# Patient Record
Sex: MALE | Race: OTHER | HISPANIC OR LATINO | URBAN - METROPOLITAN AREA
[De-identification: names, ages, dates, MRNs, and addresses within clinical notes are randomized per-mention and may not be internally consistent; named-entity substitution may affect disease eponyms.]

---

## 2018-02-02 ENCOUNTER — INPATIENT (INPATIENT)
Facility: HOSPITAL | Age: 48
LOS: 13 days | Discharge: OTHER INSTITUTION | End: 2018-02-16
Attending: HOSPITALIST | Admitting: HOSPITALIST
Payer: MEDICAID

## 2018-02-02 VITALS
TEMPERATURE: 99 F | HEIGHT: 69 IN | OXYGEN SATURATION: 100 % | DIASTOLIC BLOOD PRESSURE: 79 MMHG | WEIGHT: 139.99 LBS | RESPIRATION RATE: 18 BRPM | HEART RATE: 80 BPM | SYSTOLIC BLOOD PRESSURE: 120 MMHG

## 2018-02-02 DIAGNOSIS — C80.1 MALIGNANT (PRIMARY) NEOPLASM, UNSPECIFIED: ICD-10-CM

## 2018-02-03 DIAGNOSIS — Z01.818 ENCOUNTER FOR OTHER PREPROCEDURAL EXAMINATION: ICD-10-CM

## 2018-02-03 DIAGNOSIS — F19.10 OTHER PSYCHOACTIVE SUBSTANCE ABUSE, UNCOMPLICATED: ICD-10-CM

## 2018-02-03 DIAGNOSIS — C80.1 MALIGNANT (PRIMARY) NEOPLASM, UNSPECIFIED: ICD-10-CM

## 2018-02-03 DIAGNOSIS — Z29.9 ENCOUNTER FOR PROPHYLACTIC MEASURES, UNSPECIFIED: ICD-10-CM

## 2018-02-03 DIAGNOSIS — B19.20 UNSPECIFIED VIRAL HEPATITIS C WITHOUT HEPATIC COMA: ICD-10-CM

## 2018-02-03 DIAGNOSIS — M84.40XA PATHOLOGICAL FRACTURE, UNSPECIFIED SITE, INITIAL ENCOUNTER FOR FRACTURE: ICD-10-CM

## 2018-02-03 LAB
24R-OH-CALCIDIOL SERPL-MCNC: 36 NG/ML — SIGNIFICANT CHANGE UP (ref 30–80)
AFP-TM SERPL-MCNC: 2.9 NG/ML — SIGNIFICANT CHANGE UP
ALBUMIN SERPL ELPH-MCNC: 2.8 G/DL — LOW (ref 3.3–5)
ALP SERPL-CCNC: 130 U/L — HIGH (ref 40–120)
ALT FLD-CCNC: 91 U/L — HIGH (ref 4–41)
APTT BLD: 33.8 SEC — SIGNIFICANT CHANGE UP (ref 27.5–37.4)
AST SERPL-CCNC: 140 U/L — HIGH (ref 4–40)
BASOPHILS # BLD AUTO: 0.02 K/UL — SIGNIFICANT CHANGE UP (ref 0–0.2)
BASOPHILS NFR BLD AUTO: 0.3 % — SIGNIFICANT CHANGE UP (ref 0–2)
BILIRUB SERPL-MCNC: 0.6 MG/DL — SIGNIFICANT CHANGE UP (ref 0.2–1.2)
BLD GP AB SCN SERPL QL: NEGATIVE — SIGNIFICANT CHANGE UP
BUN SERPL-MCNC: 10 MG/DL — SIGNIFICANT CHANGE UP (ref 7–23)
CALCIUM SERPL-MCNC: 8.1 MG/DL — LOW (ref 8.4–10.5)
CHLORIDE SERPL-SCNC: 103 MMOL/L — SIGNIFICANT CHANGE UP (ref 98–107)
CO2 SERPL-SCNC: 23 MMOL/L — SIGNIFICANT CHANGE UP (ref 22–31)
CREAT SERPL-MCNC: 0.6 MG/DL — SIGNIFICANT CHANGE UP (ref 0.5–1.3)
EOSINOPHIL # BLD AUTO: 0.14 K/UL — SIGNIFICANT CHANGE UP (ref 0–0.5)
EOSINOPHIL NFR BLD AUTO: 2.3 % — SIGNIFICANT CHANGE UP (ref 0–6)
GLUCOSE SERPL-MCNC: 95 MG/DL — SIGNIFICANT CHANGE UP (ref 70–99)
HCT VFR BLD CALC: 31.8 % — LOW (ref 39–50)
HCV AB S/CO SERPL IA: 6.43 S/CO — SIGNIFICANT CHANGE UP
HCV AB SERPL-IMP: REACTIVE — SIGNIFICANT CHANGE UP
HGB BLD-MCNC: 10.2 G/DL — LOW (ref 13–17)
IMM GRANULOCYTES # BLD AUTO: 0.03 # — SIGNIFICANT CHANGE UP
IMM GRANULOCYTES NFR BLD AUTO: 0.5 % — SIGNIFICANT CHANGE UP (ref 0–1.5)
INR BLD: 1.14 — SIGNIFICANT CHANGE UP (ref 0.88–1.17)
LYMPHOCYTES # BLD AUTO: 1.1 K/UL — SIGNIFICANT CHANGE UP (ref 1–3.3)
LYMPHOCYTES # BLD AUTO: 18 % — SIGNIFICANT CHANGE UP (ref 13–44)
MAGNESIUM SERPL-MCNC: 1.9 MG/DL — SIGNIFICANT CHANGE UP (ref 1.6–2.6)
MCHC RBC-ENTMCNC: 27.8 PG — SIGNIFICANT CHANGE UP (ref 27–34)
MCHC RBC-ENTMCNC: 32.1 % — SIGNIFICANT CHANGE UP (ref 32–36)
MCV RBC AUTO: 86.6 FL — SIGNIFICANT CHANGE UP (ref 80–100)
MONOCYTES # BLD AUTO: 0.61 K/UL — SIGNIFICANT CHANGE UP (ref 0–0.9)
MONOCYTES NFR BLD AUTO: 10 % — SIGNIFICANT CHANGE UP (ref 2–14)
NEUTROPHILS # BLD AUTO: 4.22 K/UL — SIGNIFICANT CHANGE UP (ref 1.8–7.4)
NEUTROPHILS NFR BLD AUTO: 68.9 % — SIGNIFICANT CHANGE UP (ref 43–77)
NRBC # FLD: 0 — SIGNIFICANT CHANGE UP
PHOSPHATE SERPL-MCNC: 2.4 MG/DL — LOW (ref 2.5–4.5)
PLATELET # BLD AUTO: 115 K/UL — LOW (ref 150–400)
PMV BLD: 11.6 FL — SIGNIFICANT CHANGE UP (ref 7–13)
POTASSIUM SERPL-MCNC: 4.3 MMOL/L — SIGNIFICANT CHANGE UP (ref 3.5–5.3)
POTASSIUM SERPL-SCNC: 4.3 MMOL/L — SIGNIFICANT CHANGE UP (ref 3.5–5.3)
PROT SERPL-MCNC: 8.7 G/DL — HIGH (ref 6–8.3)
PROTHROM AB SERPL-ACNC: 12.7 SEC — SIGNIFICANT CHANGE UP (ref 9.8–13.1)
RBC # BLD: 3.67 M/UL — LOW (ref 4.2–5.8)
RBC # FLD: 14.8 % — HIGH (ref 10.3–14.5)
RH IG SCN BLD-IMP: POSITIVE — SIGNIFICANT CHANGE UP
SODIUM SERPL-SCNC: 138 MMOL/L — SIGNIFICANT CHANGE UP (ref 135–145)
WBC # BLD: 6.12 K/UL — SIGNIFICANT CHANGE UP (ref 3.8–10.5)
WBC # FLD AUTO: 6.12 K/UL — SIGNIFICANT CHANGE UP (ref 3.8–10.5)

## 2018-02-03 PROCEDURE — 99223 1ST HOSP IP/OBS HIGH 75: CPT | Mod: GC

## 2018-02-03 PROCEDURE — 12345: CPT | Mod: GC,NC

## 2018-02-03 PROCEDURE — 93010 ELECTROCARDIOGRAM REPORT: CPT

## 2018-02-03 RX ORDER — HYDROMORPHONE HYDROCHLORIDE 2 MG/ML
3 INJECTION INTRAMUSCULAR; INTRAVENOUS; SUBCUTANEOUS EVERY 4 HOURS
Qty: 0 | Refills: 0 | Status: DISCONTINUED | OUTPATIENT
Start: 2018-02-03 | End: 2018-02-08

## 2018-02-03 RX ORDER — DOCUSATE SODIUM 100 MG
100 CAPSULE ORAL
Qty: 0 | Refills: 0 | Status: DISCONTINUED | OUTPATIENT
Start: 2018-02-03 | End: 2018-02-03

## 2018-02-03 RX ORDER — POLYETHYLENE GLYCOL 3350 17 G/17G
17 POWDER, FOR SOLUTION ORAL DAILY
Qty: 0 | Refills: 0 | Status: DISCONTINUED | OUTPATIENT
Start: 2018-02-03 | End: 2018-02-16

## 2018-02-03 RX ORDER — OXYCODONE HYDROCHLORIDE 5 MG/1
10 TABLET ORAL EVERY 8 HOURS
Qty: 0 | Refills: 0 | Status: DISCONTINUED | OUTPATIENT
Start: 2018-02-03 | End: 2018-02-03

## 2018-02-03 RX ORDER — MORPHINE SULFATE 50 MG/1
2 CAPSULE, EXTENDED RELEASE ORAL ONCE
Qty: 0 | Refills: 0 | Status: DISCONTINUED | OUTPATIENT
Start: 2018-02-03 | End: 2018-02-03

## 2018-02-03 RX ORDER — OXYCODONE HYDROCHLORIDE 5 MG/1
10 TABLET ORAL EVERY 8 HOURS
Qty: 0 | Refills: 0 | Status: DISCONTINUED | OUTPATIENT
Start: 2018-02-03 | End: 2018-02-05

## 2018-02-03 RX ORDER — OXYCODONE AND ACETAMINOPHEN 5; 325 MG/1; MG/1
2 TABLET ORAL ONCE
Qty: 0 | Refills: 0 | Status: DISCONTINUED | OUTPATIENT
Start: 2018-02-03 | End: 2018-02-03

## 2018-02-03 RX ORDER — GABAPENTIN 400 MG/1
300 CAPSULE ORAL EVERY 8 HOURS
Qty: 0 | Refills: 0 | Status: DISCONTINUED | OUTPATIENT
Start: 2018-02-03 | End: 2018-02-05

## 2018-02-03 RX ORDER — SODIUM,POTASSIUM PHOSPHATES 278-250MG
1 POWDER IN PACKET (EA) ORAL
Qty: 0 | Refills: 0 | Status: COMPLETED | OUTPATIENT
Start: 2018-02-03 | End: 2018-02-04

## 2018-02-03 RX ORDER — PROPRANOLOL HCL 160 MG
10 CAPSULE, EXTENDED RELEASE 24HR ORAL EVERY 12 HOURS
Qty: 0 | Refills: 0 | Status: DISCONTINUED | OUTPATIENT
Start: 2018-02-03 | End: 2018-02-16

## 2018-02-03 RX ORDER — HYDROMORPHONE HYDROCHLORIDE 2 MG/ML
3 INJECTION INTRAMUSCULAR; INTRAVENOUS; SUBCUTANEOUS EVERY 4 HOURS
Qty: 0 | Refills: 0 | Status: DISCONTINUED | OUTPATIENT
Start: 2018-02-03 | End: 2018-02-03

## 2018-02-03 RX ADMIN — MORPHINE SULFATE 2 MILLIGRAM(S): 50 CAPSULE, EXTENDED RELEASE ORAL at 09:26

## 2018-02-03 RX ADMIN — OXYCODONE HYDROCHLORIDE 10 MILLIGRAM(S): 5 TABLET ORAL at 06:46

## 2018-02-03 RX ADMIN — GABAPENTIN 300 MILLIGRAM(S): 400 CAPSULE ORAL at 22:17

## 2018-02-03 RX ADMIN — OXYCODONE AND ACETAMINOPHEN 2 TABLET(S): 5; 325 TABLET ORAL at 00:56

## 2018-02-03 RX ADMIN — OXYCODONE HYDROCHLORIDE 10 MILLIGRAM(S): 5 TABLET ORAL at 15:31

## 2018-02-03 RX ADMIN — HYDROMORPHONE HYDROCHLORIDE 3 MILLIGRAM(S): 2 INJECTION INTRAMUSCULAR; INTRAVENOUS; SUBCUTANEOUS at 11:49

## 2018-02-03 RX ADMIN — OXYCODONE HYDROCHLORIDE 10 MILLIGRAM(S): 5 TABLET ORAL at 07:46

## 2018-02-03 RX ADMIN — HYDROMORPHONE HYDROCHLORIDE 3 MILLIGRAM(S): 2 INJECTION INTRAMUSCULAR; INTRAVENOUS; SUBCUTANEOUS at 11:34

## 2018-02-03 RX ADMIN — Medication 1 TABLET(S): at 13:03

## 2018-02-03 RX ADMIN — GABAPENTIN 300 MILLIGRAM(S): 400 CAPSULE ORAL at 13:02

## 2018-02-03 RX ADMIN — HYDROMORPHONE HYDROCHLORIDE 3 MILLIGRAM(S): 2 INJECTION INTRAMUSCULAR; INTRAVENOUS; SUBCUTANEOUS at 16:05

## 2018-02-03 RX ADMIN — OXYCODONE HYDROCHLORIDE 10 MILLIGRAM(S): 5 TABLET ORAL at 23:08

## 2018-02-03 RX ADMIN — HYDROMORPHONE HYDROCHLORIDE 3 MILLIGRAM(S): 2 INJECTION INTRAMUSCULAR; INTRAVENOUS; SUBCUTANEOUS at 15:50

## 2018-02-03 RX ADMIN — HYDROMORPHONE HYDROCHLORIDE 3 MILLIGRAM(S): 2 INJECTION INTRAMUSCULAR; INTRAVENOUS; SUBCUTANEOUS at 20:42

## 2018-02-03 RX ADMIN — OXYCODONE HYDROCHLORIDE 10 MILLIGRAM(S): 5 TABLET ORAL at 22:17

## 2018-02-03 RX ADMIN — MORPHINE SULFATE 2 MILLIGRAM(S): 50 CAPSULE, EXTENDED RELEASE ORAL at 09:41

## 2018-02-03 RX ADMIN — Medication 1 TABLET(S): at 22:17

## 2018-02-03 RX ADMIN — Medication 10 MILLIGRAM(S): at 18:02

## 2018-02-03 RX ADMIN — HYDROMORPHONE HYDROCHLORIDE 3 MILLIGRAM(S): 2 INJECTION INTRAMUSCULAR; INTRAVENOUS; SUBCUTANEOUS at 20:57

## 2018-02-03 RX ADMIN — Medication 1 TABLET(S): at 18:02

## 2018-02-03 RX ADMIN — Medication 1 TABLET(S): at 14:31

## 2018-02-03 RX ADMIN — OXYCODONE HYDROCHLORIDE 10 MILLIGRAM(S): 5 TABLET ORAL at 14:31

## 2018-02-03 RX ADMIN — OXYCODONE AND ACETAMINOPHEN 2 TABLET(S): 5; 325 TABLET ORAL at 01:56

## 2018-02-03 NOTE — PROGRESS NOTE ADULT - PROBLEM SELECTOR PLAN 2
As per outside records, positive IgG. Complicated by liver cirrhosis, esophageal varices, portal HTN. Outside labs show thrombocytopenia,, transaminitis,, and elevated alk phos, synthetic function preserved (INR wnl)  - f/u PCR and genotype  - Consider starting nadolol for primary ppx if BP tolerates (will hold for now in case Ortho would like to take patient for surgery)   - will consult hepatology

## 2018-02-03 NOTE — DISCHARGE NOTE ADULT - HOSPITAL COURSE
48 y/o male with hx of drug abuse, hepatitis C, chronic back pain, possible new diagnosis of hepatocellular carcinoma transferred from outside hospital for pathologic fracture. Patient initially presented to the hospital two weeks ago for worsening lower back and left hip pain with limited ambulation. Imaging revealed pathologic fractures of the left sacrum, left ilium, and right acetabulum. Orthopaedic surgery was consulted who recommended _______. Hepatitis labs showed ____________. Hepatology was consulted who recommended __________. Pain was controlled with a regimen of _________.     Pt. is stable and safe for d/c to _________. 46 y/o male with hx of drug abuse, hepatitis C, chronic back pain, possible new diagnosis of hepatocellular carcinoma transferred from outside hospital for pathologic fracture. Patient initially presented to the hospital two weeks ago for worsening lower back and left hip pain with limited ambulation. Imaging revealed pathologic fractures of the left sacrum, left ilium, and right acetabulum. Biopsy of left pelvic mass at OSH was suggestive of a pancreaticobiliary or lung primary but no primary indeterminate. IR was consulted for repeat biopsy in order to diagnose primary. Orthopedic surgery was consulted who recommended further imaging which showed ________. Hepatitis labs showed hep C RNA, qualitative +, hep C virus RNA detection by PCR at 2,336,276, RNA log value of 6.37, and genotype of 1a. Hepatology was consulted who determined that this was likely chronic HCV and deferred treatment until after malignancy w/u was completed.     Pt was experiencing severe pain requiring palliative team for pain management.      Pt's pain was well-controlled and he was hemodynamically stable on day of discharge. 48 y/o male with hx of drug abuse, hepatitis C, chronic back pain, possible new diagnosis of metastatic disease, transferred from outside hospital for pathologic fracture. Patient initially presented to the hospital two weeks ago for worsening lower back and left hip pain with limited ambulation. Imaging revealed pathologic fractures of the left sacrum, left ilium, and right acetabulum. Biopsy of left pelvic mass at OSH was suggestive of a pancreaticobiliary or lung primary but other sites not able to be ruled out. Pt was transferred to this hospital for possible orthopedic intervention. IR was consulted for repeat biopsy in order to diagnose primary. Oncology recommended labs and expedition of biopsy in order to construct a plan for treatment. Orthopedic surgery was consulted who recommended further imaging of the pathologic fractures which showed ________. Ortho _____. Pt was experiencing severe pain requiring palliative team for pain management. Patient's pain was well-controlled on methadone, dilaudid IVPB and IV, and lyrica regimen w/ narcan prn if needed. Hepatitis labs showed hep C RNA, qualitative +, hep C virus RNA detection by PCR at 2,336,276, RNA log value of 6.37, and genotype of 1a. Hepatology was consulted who determined that this was likely chronic HCV and deferred treatment until after malignancy w/u was completed. Biopsy showed____. Oncology ____.     Pt's pain was well-controlled and he was hemodynamically stable on day of discharge. 46 y/o male with hx of drug abuse, hepatitis C, chronic back pain who was transferred from [facility] for pathologic fracture. Patient initially presented to [facility] two weeks ago for worsening lower back and left hip pain with limited ambulation. Imaging revealed pathologic fractures of the left sacrum, left ilium, and right acetabulum. Biopsy of left pelvic mass at OSH was suggestive of a pancreaticobiliary or lung primary but other sites not able to be ruled out. Pt was transferred to this hospital for possible orthopedic intervention. IR was consulted for repeat biopsy in order to diagnose primary. Oncology recommended labs and expedition of biopsy in order to construct a plan for treatment. Orthopedic surgery was consulted, however patient was unable to tolerate the recommended MRI imaging of the hip.Pt was experiencing severe pain requiring palliative team for pain management. Patient's pain was well-controlled on methadone, dilaudid IVPB and IV, and lyrica regimen w/ narcan prn if needed. Hepatitis labs showed hep C RNA, qualitative +, hep C virus RNA detection by PCR at 2,336,276, RNA log value of 6.37, and genotype of 1a. Hepatology was consulted who determined that this was likely chronic HCV and deferred treatment until after malignancy w/u was completed. Biopsy showed metastatic dz - likely adenocarcinoma of unclear primary. 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 Per onc not a candidate for systemic chemotherapy due to lack of social support. Extensive discussions with the patient came to the conclusion that the patient wanted to focus on comfort care and would be willing to transfer to [facility] for inpatient hospice for pain management. A MOLST form was filled out w/ DNR/DNI.     Pt's pain was well-controlled and he was hemodynamically stable on day of discharge. 46 y/o male with hx of drug abuse, hepatitis C, chronic back pain who was transferred from [facility] for pathologic fracture. Patient initially presented to [facility] two weeks ago for worsening lower back and left hip pain with limited ambulation. Imaging revealed pathologic fractures of the left sacrum, left ilium, and right acetabulum. Biopsy of left pelvic mass at OSH was suggestive of a pancreaticobiliary or lung primary but other sites not able to be ruled out. Pt was transferred to this hospital for possible orthopedic intervention. IR was consulted for repeat biopsy in order to diagnose primary. Oncology recommended labs and expedition of biopsy in order to construct a plan for treatment. Orthopedic surgery was consulted, however patient was unable to tolerate the recommended MRI imaging of the hip.Pt was experiencing severe pain requiring palliative assistance with pain management. Patient's pain was well-controlled on methadone, dilaudid IVPB and IV, and lyrica regimen. Hepatitis labs showed hep C RNA, qualitative +, hep C virus RNA detection by PCR at 2,336,276, RNA log value of 6.37, and genotype of 1a. Hepatology was consulted who determined that this was likely chronic HCV and deferred treatment given malignancy. Biopsy showed metastatic dz - likely adenocarcinoma of unclear primary. Per ortho no surgical intervention as fixation would unlikely benefit patient w/o radiation as patient is currently refusing radiation.The primary was not determined but was shown as a poorly differentiated adenocarcinoma, ?pancreaticobiliary vs lung with elevated ca 19-9 and CEA. Per onc not a candidate for systemic chemotherapy due to lack of social support. Extensive discussions with the patient came to the conclusion that the patient wanted to focus on comfort care and would be willing to transfer to [facility] for inpatient hospice for pain management. A MOLST form was filled out w/ DNR/DNI.     Pt's pain was well-controlled and he was hemodynamically stable on day of discharge.

## 2018-02-03 NOTE — H&P ADULT - HISTORY OF PRESENT ILLNESS
46 yo Male with hx of drug abuse, hepatitis C, chronic back pain 46 yo Male with hx of drug abuse, hepatitis C, chronic back pain, possible new diagnosis of hepatocellular carcinoma transferred from outside hospital for pathologic fracture. Patient initially presented to the hospital two weeks ago for worsening lower back and left hip pain. Pain limited ambulation. Imaging revealed pathologic fractures of the left sacrum, left ilium, and right acetabulum. Patient was accepted by Dr. Back (orthopedic surgery). Patient reports he has been using IV heroin for about twenty years. He was diagnosed with hepatitic C, but never received treatment. He completed a detox program prior to arriving to the hospital two weeks ago. Does not have family; lives in a shelter.     Denies fevers, chills, nausea, vomiting, hemoptysis, chest pain, SOB, abdominal pain, abdominal swelling, lower extremity swelling, rashes. Does have intermittent nose bleeds. 46 yo Male with hx of drug abuse, hepatitis C, chronic back pain, possible new diagnosis of hepatocellular carcinoma transferred from outside hospital for pathologic fracture. Patient initially presented to the hospital two weeks ago for worsening lower back and left hip pain. Pain limited ambulation. Imaging revealed pathologic fractures of the left sacrum, left ilium, and right acetabulum. Patient was accepted by Dr. Back (orthopedic surgery). Patient reports he has been using IV heroin for about twenty years. Recently went through detox, s/p methadone (no longer taking). He was diagnosed with hepatitic C, but never received treatment. He completed a detox program prior to arriving to the hospital two weeks ago. Does not have family; lives in a shelter.     Denies fevers, chills, nausea, vomiting, hemoptysis, chest pain, SOB, abdominal pain, abdominal swelling, lower extremity swelling, rashes. Does have intermittent nose bleeds.

## 2018-02-03 NOTE — H&P ADULT - NSHPSOCIALHISTORY_GEN_ALL_CORE
Social alcohol use   Former tobacco use  IV heroin use daily  No family  lives in shelter  Unemployed

## 2018-02-03 NOTE — H&P ADULT - PROBLEM SELECTOR PLAN 1
left ilium, right acetabulum, and left sacrum in setting of newly diagnosed malignancy- primary liver most likely   - Will contact Orthopedic Surgery team and Dr. Back to confirm they are aware the patient has arrived   - Pain control with oxycodone 10 q8hrs (as per patient, that is what he was taking in the prior hospital)   - Bowel regimen   - Neuro checks q8hrs  - Vit D level pending

## 2018-02-03 NOTE — PROGRESS NOTE ADULT - PROBLEM SELECTOR PLAN 1
left ilium, right acetabulum, and left sacrum in setting of newly diagnosed malignancy- primary most likely HCC  - contact orthopaedic surgery (Dr. Back accepted pt)  - Pain control with oxycodone 10 q8hrs (per pt, need med rec from OSH)  - Bowel regimen   - Neuro checks q8hrs left ilium, right acetabulum, and left sacrum in setting of newly diagnosed malignancy- primary most likely HCC  - contact orthopaedic surgery (Dr. Back)  - Pain control with oxycodone 10 q8hrs (per pt, need med rec from OSH)  - Bowel regimen   - Neuro checks q8hrs

## 2018-02-03 NOTE — H&P ADULT - PROBLEM SELECTOR PLAN 2
As per outside records, positive IgG. Complicated by liver cirrhosis, esophageal varices, portal HTN. Outside labs show thrombocytopenia,, transaminitis,, and elevated alk phos   - Will check PCR and genotype  - Consider starting nadolol for primary ppx if BP tolerates (will hold for now in case Ortho would like to take patient for surgery)   - Will check LFTs, INR

## 2018-02-03 NOTE — H&P ADULT - PROBLEM SELECTOR PLAN 3
stage IV. Liver is likely the primary source as per imaging   - Will check alpha feta protein level   - Call Dr. Donovan in AM, he is the oncologist that has been following the patient

## 2018-02-03 NOTE — DISCHARGE NOTE ADULT - MEDICATION SUMMARY - MEDICATIONS TO TAKE
I will START or STAY ON the medications listed below when I get home from the hospital:    methadone  -- 5 milligram(s) by mouth once a day at 6AM  -- Indication: For Pain due to neoplasm    methadone  -- 7.5 milligram(s) by mouth once a day at 6PM  -- Indication: For Pain due to neoplasm    HYDROmorphone  -- 4 milligram(s) intravenous every 3 hours, As Needed for severe pain  -- Indication: For Pain due to neoplasm    HYDROmorphone  -- 8 milligram(s) in sodium chloride 0.9% 50 miliLiters, IV intermittent, every day at 04:00, 10:00, 16:00 and 22:00, infused over 15 minutes (IV piggy back)  -- Indication: For Pain due to neoplasm    propranolol 10 mg oral tablet  -- 1 tab(s) by mouth every 12 hours. HOLD for SBP < 110, HR< 65  -- Indication: For Esophageal varices Prophylaxis, history of hepatitis C    enoxaparin  -- 40 milligram(s) subcutaneous once a day  -- Indication: For VTE prophylaxis    pregabalin 75 mg oral capsule  -- 1 cap(s) by mouth 2 times a day  -- Indication: For Pain due to neoplasm    naloxone  -- 0.4 milligram(s) intravenous every 2 minutes, As Needed for respiratory depression (respiratory rate < 10), and sedation  -- Indication: For Antidote to opioid toxicity    senna oral tablet  -- 2 tab(s) by mouth once a day (at bedtime)  -- Indication: For Constipation    polyethylene glycol 3350 oral powder for reconstitution  -- 17 gram(s) by mouth once a day, As needed, Constipation  -- Indication: For Constipation    docusate sodium 100 mg oral capsule  -- 1 cap(s) by mouth once a day  -- Indication: For Constipation    melatonin 3 mg oral tablet  -- 1 tab(s) by mouth once a day (at bedtime), As Needed for insomnia  -- Indication: For Insomnia, unspecified type    calcium-vitamin D 500 mg-200 intl units oral tablet  -- 1 tab(s) by mouth once a day  -- Indication: For Bone strength

## 2018-02-03 NOTE — DISCHARGE NOTE ADULT - OTHER SIGNIFICANT FINDINGS
Final Diagnosis  SOFT TISSUE, PELVIC AREA, LEFT, CORE BIOPSY  POSITIVE FOR MALIGNANT CELLS.  Poorly differentiated non-small cellcarcinoma, favor  adenocarcinoma    Note: Cytology imprints and core biopsy show malignant cells,  singly and in flat cohesive sheets, with enlarged irregular  nuclei, irregular chromatin pattern, prominent nucleoli, and  scant vacuolated cytoplasm in a background of              ARROYO CANDICE                     3        Cytopathology Report          marked necrotic debris, acute inflammatory cells, atypical  mitotic figures, and cellular apoptosis.    Immunostains for AE1/AE3, and CK 7 are strongly positive in tumor  cells.  Focal staining is noted with high molecular weight cytokeratin  and .  Stains for CK 20, TTF-1, MANUEL-3, WT1, CDX - 2, CA 19-9 and P16  are negative..  The immunophenotype supports the diagnosis of poorly  differentiated carcinoma but does not identify the primary site.  Additional immunostains are pending; supplemental report to  follow.    Slide(s) with built in immunohistochemical study control(s) and  negative control associated with this case has/have been verified  by the sign out pathologist.  For slide(s) without built in controls positive control slides  has/have been reviewed and approved by immunohistochemistry lab  These immunohistochemical/ in-situ hybridization tests have been  developed and their performance characteristics determined by  [facility], Department of Pathology,  Division of Immunopathology, [facility] [place] [address].  It has not been cleared or approved by the U.[place]. Food  and Drug Administration.  The FDA has determined that such  clearance or approval is not necessary.  This test is used for  clinical purposes.  The laboratory is certified under the CLIA-88  as qualified to perform high complexity clinical testing.    This case was reviewed for  with cytopathology  staff who  concur(s) with the diagnosis on 02/13/18.

## 2018-02-03 NOTE — DISCHARGE NOTE ADULT - CARE PLAN
Principal Discharge DX:	Pathologic fracture  Goal:	follow up  Assessment and plan of treatment:	You had a fracture of your hip due to metastatic disease from your cancer. Orthopaedic surgery ______  Secondary Diagnosis:	Hepatitis C  Assessment and plan of treatment:	You have an active hepatitis C infection. Hepatology was consulted who recommended _______. Please follow up with them after discharge.  Secondary Diagnosis:	Malignancy  Assessment and plan of treatment:	You have a malignancy that has metastasized to your bones. Biopsy results showed _______. Please follow up with your oncologist Dr. Donovan after discharge. Principal Discharge DX:	Pathologic fracture  Goal:	follow up  Assessment and plan of treatment:	You had a fracture of your hip due to metastatic disease from your cancer. Orthopaedic surgery ordered imaging to evaluate your fractures and determined that ____  Secondary Diagnosis:	Hepatitis C  Goal:	Follow up  Assessment and plan of treatment:	You have an active hepatitis C infection. Hepatology was consulted who recommended . Please follow up with them after discharge.  Secondary Diagnosis:	Malignancy  Assessment and plan of treatment:	You have a malignancy that has metastasized to your bones. Biopsy results showed _______. Please follow up with your oncologist Dr. Donovan after discharge. Principal Discharge DX:	Pathologic fracture  Goal:	follow up  Assessment and plan of treatment:	You had a fracture of your hip due to metastatic disease from your cancer. Orthopedic surgery ordered imaging to evaluate your fractures and determined that ____  Secondary Diagnosis:	Hepatitis C  Goal:	Follow up  Assessment and plan of treatment:	You have an active hepatitis C infection. Hepatology was consulted who recommended . Please follow up with them after discharge.  Secondary Diagnosis:	Malignancy  Assessment and plan of treatment:	You have a malignancy that has metastasized to your bones. Biopsy results showed _______. Please follow up with your oncologist Dr. Donovan after discharge. Principal Discharge DX:	Pathologic fracture  Goal:	follow up  Assessment and plan of treatment:	You had a fracture of your hip due to metastatic disease from your cancer.  Secondary Diagnosis:	Hepatitis C  Goal:	Follow up  Assessment and plan of treatment:	You have an active hepatitis C infection. Hepatology was consulted who recommended . Please follow up with them after discharge.  Secondary Diagnosis:	Malignancy  Assessment and plan of treatment:	You have a malignancy that has metastasized to your bones. Biopsy results showed _______. Please follow up with your oncologist Dr. Donovan after discharge. Principal Discharge DX:	Pathologic fracture  Goal:	follow up  Assessment and plan of treatment:	You were admitted for a fracture that was caused by metastatic disease from an unknown primary cancer. You were evaluated by orthopedics who determined that surgery was more risk than would be benefit due to the nature of the metastatic disease and being that you did not want radiation therapy. It was determined that you would not benefit from chemotherapy. You decided with Dr. Melgar that you would like to continue with inpatient hospice at [facility] for pain management and you signed a MOLST form stating that you would not want heroic measures.  Secondary Diagnosis:	Hepatitis C  Goal:	Follow up  Assessment and plan of treatment:	You have an active hepatitis C infection. Hepatology was consulted who recommended . No further treatment was offer as it would cause more harm than benefit due to the overall medical condition.  Secondary Diagnosis:	Adenocarcinoma  Assessment and plan of treatment:	You were admitted for a fracture that was caused by metastatic disease from an unknown primary cancer.  The biopsy results came back as poorly differentiated adenocarcinoma of unknown primary. It was determined that you would not benefit from chemotherapy. You decided with Dr. Melgar that you would like to continue with inpatient hospice at [facility] for pain management and you signed a MOLST form stating that you would not want heroic measures.  Secondary Diagnosis:	Palliative care encounter  Assessment and plan of treatment:	You decided with Dr. Melgar that you would like to continue with inpatient hospice at [facility] for pain management and you signed a MOLST form stating that you would not want heroic measures.  Secondary Diagnosis:	Pain due to neoplasm  Assessment and plan of treatment:	Due to the extensive metastatic disease you have significant pain requiring constant medication for pain control. You will continue your management at [facility].  Secondary Diagnosis:	Cirrhosis of liver without ascites, unspecified hepatic cirrhosis type  Assessment and plan of treatment:	You did not have any complications from cirrhosis likely secondary to hepatitis C. No further treatment was offer as it would cause more harm than benefit due to the overall medical condition. You will be monitored for worsening symptoms at [facility], but are not on any current medications.  Secondary Diagnosis:	Severe protein-calorie malnutrition  Assessment and plan of treatment:	You have low protein likely due to the extensive metastatic cancer which requires dietary supplementation. Please continue your Enliven supplementation as directed.

## 2018-02-03 NOTE — CONSULT NOTE ADULT - SUBJECTIVE AND OBJECTIVE BOX
Chief Complaint:  Patient is a 47y old  Male who presents with a chief complaint of hip pain (03 Feb 2018 12:32)      HPI:  47M with drug abuse, HCV infection, chronic back pain, presented to outside hospital with acute on chronic back pain at which time he was found to have pathologic fractures and transferred to [facility] for orthopedic evaluation. The patient states he was in the hospital for 2 weeks and had recently completed a drug rehab program- he had previously been using IV heroin over the past 20 years. He is aware that he is HCV positive and has never received treatment. He denies any abdominal pain, nausea or vomiting. His pain is localized to his bilateral hips. He notes 40-50lb unintentional weight loss over the past 6 months. He denies any jaundice or pruritis. He notes regular non-bloody bowel movements. He has an appetite.   The patient states that while he was had OSH, he underwent MRI and biopsy of liver lesion- he is unsure of the result.     Allergies:  No Known Allergies      Home Medications: reviewed     Hospital Medications:  calcium carbonate 1250 mG + Vitamin D (OsCal 500 + D) 1 Tablet(s) Oral daily  gabapentin 300 milliGRAM(s) Oral every 8 hours  HYDROmorphone  Injectable 3 milliGRAM(s) IV Push every 4 hours PRN  oxyCODONE    IR 10 milliGRAM(s) Oral every 8 hours  polyethylene glycol 3350 17 Gram(s) Oral daily PRN  potassium acid phosphate/sodium acid phosphate tablet (K-PHOS No. 2) 1 Tablet(s) Oral four times a day with meals  propranolol 10 milliGRAM(s) Oral every 12 hours      PMHX/PSHX:  IV drug abuse  Hepatitis C  No significant past surgical history      Family history:  No pertinent family history in first degree relatives      Social History: +IV heroin use, no tobacco, unclear ETOH     ROS:     General:  No wt loss, fevers, chills, night sweats, fatigue,   Eyes:  Good vision, no reported pain  ENT:  No sore throat, pain, runny nose, dysphagia  CV:  No pain, palpitations, hypo/hypertension  Resp:  No dyspnea, cough, tachypnea, wheezing  GI:  No pain, No nausea, No vomiting, No diarrhea, No constipation, No weight loss, No fever, No pruritis, No rectal bleeding, No tarry stools, No dysphagia,  :  No pain, bleeding, incontinence, nocturia  Muscle:  + hip pain,  no weakness  Neuro:  No weakness, tingling, memory problems  Psych:  No fatigue, insomnia, mood problems, depression  Endocrine:  No polyuria, polydipsia, cold/heat intolerance  Heme:  No petechiae, ecchymosis, easy bruisability  Skin:  No rash, tattoos, scars, edema      PHYSICAL EXAM:     GENERAL:  Appears stated age, cachectic,  no distress  HEENT:  NC/AT,  conjunctivae clear and pink, no thyromegaly, nodules, adenopathy, no JVD, sclera -anicteric  CHEST:  Full & symmetric excursion, no increased effort, breath sounds clear  HEART:  Regular rhythm, S1, S2, no murmur/rub/S3/S4, no abdominal bruit, no edema  ABDOMEN:  Soft, non-tender, non-distended, normoactive bowel sounds,  no masses ,+ hepato-splenomegaly, no signs of chronic liver disease  EXTEREMITIES:  no cyanosis,clubbing or edema  SKIN:  No rash/erythema/ecchymoses/petechiae/wounds/abscess/warm/dry  NEURO:  Alert, oriented, no asterixis, no tremor, no encephalopathy    Vital Signs:  Vital Signs Last 24 Hrs  T(C): 36.7 (03 Feb 2018 06:58), Max: 37.2 (02 Feb 2018 23:17)  T(F): 98 (03 Feb 2018 06:58), Max: 98.9 (02 Feb 2018 23:17)  HR: 75 (03 Feb 2018 06:58) (75 - 80)  BP: 118/64 (03 Feb 2018 06:58) (118/64 - 120/79)  BP(mean): --  RR: 16 (03 Feb 2018 06:58) (16 - 18)  SpO2: 100% (03 Feb 2018 06:58) (100% - 100%)  Daily Height in cm: 175.26 (02 Feb 2018 23:17)    Daily     LABS:                        10.2   6.12  )-----------( 115      ( 03 Feb 2018 06:35 )             31.8     Hemoglobin: 10.2 g/dL (02-03-18 @ 06:35)    Mean Cell Volume: 86.6 fL (02-03-18 @ 06:35)    02-03    138  |  103  |  10  ----------------------------<  95  4.3   |  23  |  0.60    Ca    8.1<L>      03 Feb 2018 06:35  Phos  2.4     02-03  Mg     1.9     02-03    TPro  8.7<H>  /  Alb  2.8<L>  /  TBili  0.6  /  DBili  x   /  AST  140<H>  /  ALT  91<H>  /  AlkPhos  130<H>  02-03    LIVER FUNCTIONS - ( 03 Feb 2018 06:35 )  Alb: 2.8 g/dL / Pro: 8.7 g/dL / ALK PHOS: 130 u/L / ALT: 91 u/L / AST: 140 u/L / GGT: x           PT/INR - ( 03 Feb 2018 06:35 )   PT: 12.7 SEC;   INR: 1.14          PTT - ( 03 Feb 2018 06:35 )  PTT:33.8 SEC    Hepatitis C Antibody Test (02.03.18 @ 06:35)    Hepatitis C Virus S/CO Ratio: 6.43 S/CO    Hepatitis C Virus Interpretation: Reactive        Imaging:    no new imaging

## 2018-02-03 NOTE — PROGRESS NOTE ADULT - ATTENDING COMMENTS
Patient seen and examined.  Agree with resident note. 47M with heroin abuse, Hepatitis C untreated complicated by cirrhosis and ascites, suspected HCC with pathological fractures.  Plan for orthopedic intervention.  Awaiting EKG.  If EKG is normal, patient is low cardiac risk for moderate risk procedure.  Will continue to follow up. Hepatology consult for HCV management.

## 2018-02-03 NOTE — PROGRESS NOTE ADULT - PROBLEM SELECTOR PLAN 5
DVT ppx: SCIDs in setting of thrombocytopenia   Diet: Regular -RCRI score of 0 with no cardiac risk factors, EKG _______, unable to evaluate METS functional capacity as pts. ambulation is greatly limited by pain, no cardiac limitations to exercise or functional capacity   -Pt. is medically optimized for OR     Medically cleared. No contraindication. -RCRI score of 0 with no cardiac risk factors, EKG pending. Patient prior to fractures, had no SOB/BREEN, CP/angina, LOC when climbing a flight of stairs.   - no cardiac limitations to exercise or functional capacity   -Pt. is medically optimized for OR pending EKG review.

## 2018-02-03 NOTE — H&P ADULT - NSHPREVIEWOFSYSTEMS_GEN_ALL_CORE
REVIEW OF SYSTEMS:  CONSTITUTIONAL: No fever. weight loss, fatigue  EYES: No eye pain, visual disturbances, or discharge  ENMT:  No difficulty hearing; No sinus or throat pain  RESPIRATORY: No cough, wheezing, chills or hemoptysis; No shortness of breath  CARDIOVASCULAR: No chest pain, palpitations, or leg swelling  GASTROINTESTINAL: No abdominal or epigastric pain. No nausea, vomiting, or hematemesis; No diarrhea or constipation. No melena or hematochezia.  GENITOURINARY: No dysuria, frequency, hematuria, or incontinence  NEUROLOGICAL: No headaches, memory loss, loss of strength, numbness  SKIN: No itching, burning, rashes, or lesions   MUSCULOSKELETAL: bilateral hip pain and lower back pain   HEME/LYMPH: nosebleeds

## 2018-02-03 NOTE — DISCHARGE NOTE ADULT - SECONDARY DIAGNOSIS.
Hepatitis C Malignancy Adenocarcinoma Palliative care encounter Pain due to neoplasm Cirrhosis of liver without ascites, unspecified hepatic cirrhosis type Severe protein-calorie malnutrition

## 2018-02-03 NOTE — H&P ADULT - NSHPLABSRESULTS_GEN_ALL_CORE
On outside record labs:   Normocytic anemia- Hgb ~9  Thrombocytopenia ~  Elevated Alk Phos 134  Elevated transaminitis    Lipid panel wnl     Prealbumin 12.14 (low)    (elevated)     Hep B surface antibody positive   Hep B core IgG negative   Hep C IgG positive   Hep A IgG positive     Free kappa/ lambda ration 92 (high)  Free lamda serum 97.3 (high)    Protein Electrophoresis showed elevated alpha 1 globulin, alpha 2 globulin, and gamma globulin  and beta 2 microglobulin   Immunofixation showed elevated IgG- a faint lambda band is present against a dense polyclonal background. While this process may represent a reactive/ inflammatory process, a developing plasma cell disorder cannot be excluded.     Pelvic mass: findings suggestive of pancreatobiliary or lung primary     CT Lumbar Spine w/o contrast: large soft tissue mass with bony destruction of the left sacrum and left ilium with pathologic fracture . Soft tissue mass does compress the left exiting nerve roots of the sacrum.     CT A/P w/contrast: evident of liver cirrhosis. splenomegaly, portal hypertension, esophageal varices, 3 cm greatest diameter mass lesion the right lobe of the liver inferiorly suggesting primary liver neoplasm. RP lymphadenopathy. Large left iliacus muscle mass with adjacent lytic lesion of the left side of the sacrum. left iliac  bone pathologic fracture. The right acetabulum pathologic fracture.     CT Chest: no evidence of mets     Bone Scan: small nonspecific focus of radiotracer uptake in right anterior 3rd rib. osteopenic defects in left iliac bine and sacrum and right acetabulum corresponding to lytic lesions Reviewed outside hospital paperwork.    On outside record labs:   Normocytic anemia- Hgb ~9  Thrombocytopenia ~  Elevated Alk Phos 134  Elevated transaminitis    Lipid panel wnl     Prealbumin 12.14 (low)    (elevated)     Hep B surface antibody positive   Hep B core IgG negative   Hep C IgG positive   Hep A IgG positive     Free kappa/ lambda ration 92 (high)  Free lamda serum 97.3 (high)    Protein Electrophoresis showed elevated alpha 1 globulin, alpha 2 globulin, and gamma globulin  and beta 2 microglobulin   Immunofixation showed elevated IgG- a faint lambda band is present against a dense polyclonal background. While this process may represent a reactive/ inflammatory process, a developing plasma cell disorder cannot be excluded.     Pelvic mass: findings suggestive of pancreatobiliary or lung primary     CT Lumbar Spine w/o contrast: large soft tissue mass with bony destruction of the left sacrum and left ilium with pathologic fracture . Soft tissue mass does compress the left exiting nerve roots of the sacrum.     CT A/P w/contrast: evident of liver cirrhosis. splenomegaly, portal hypertension, esophageal varices, 3 cm greatest diameter mass lesion the right lobe of the liver inferiorly suggesting primary liver neoplasm. RP lymphadenopathy. Large left iliacus muscle mass with adjacent lytic lesion of the left side of the sacrum. left iliac  bone pathologic fracture. The right acetabulum pathologic fracture.     CT Chest: no evidence of mets     Bone Scan: small nonspecific focus of radiotracer uptake in right anterior 3rd rib. osteopenic defects in left iliac bine and sacrum and right acetabulum corresponding to lytic lesions

## 2018-02-03 NOTE — PROGRESS NOTE ADULT - SUBJECTIVE AND OBJECTIVE BOX
Patient is a 47y old  Male who presents with a chief complaint of hip pain (03 Feb 2018 04:49)    SUBJECTIVE / OVERNIGHT EVENTS: Pt. endorsing lower back and hip pain, unchanged since admission. Denies fevers, chills, nausea.     MEDICATIONS  (STANDING):    MEDICATIONS  (PRN):  docusate sodium 100 milliGRAM(s) Oral two times a day PRN Constipation  oxyCODONE    IR 10 milliGRAM(s) Oral every 8 hours PRN Severe Pain (7 - 10)  polyethylene glycol 3350 17 Gram(s) Oral daily PRN Constipation      Vital Signs Last 24 Hrs  T(C): 37.2 (02 Feb 2018 23:17), Max: 37.2 (02 Feb 2018 23:17)  T(F): 98.9 (02 Feb 2018 23:17), Max: 98.9 (02 Feb 2018 23:17)  HR: 80 (02 Feb 2018 23:17) (80 - 80)  BP: 120/79 (02 Feb 2018 23:17) (120/79 - 120/79)  BP(mean): --  RR: 18 (02 Feb 2018 23:17) (18 - 18)  SpO2: 100% (02 Feb 2018 23:17) (100% - 100%)  CAPILLARY BLOOD GLUCOSE      I&O's Summary    PHYSICAL EXAM:  GENERAL: NAD, cachectic   	HEAD:  Atraumatic, Normocephalic  	EYES: EOMI, conjunctiva and sclera clear  	ENMT: Moist mucous membranes, No lesions  	CHEST/LUNG: Clear to percussion bilaterally; No rales, rhonchi, wheezing, or rubs  	HEART: Regular rate and rhythm; No murmurs, rubs, or gallops, no edema  	ABDOMEN: Soft, Nontender, Nondistended; Bowel sounds present; splenomegaly   	EXTREMITIES:  2+ Peripheral Pulses, No clubbing, cyanosis, or edema  	SKIN: track marks on bilateral upper extremities, no rash    NERVOUS SYSTEM:  Alert & Oriented X3, Good concentration; Motor Strength 5/5 B/L upper and lower extremities    LABS:                        10.2   6.12  )-----------( 115      ( 03 Feb 2018 06:35 )             31.8      outside hospital paperwork.   On outside record labs:   Normocytic anemia- Hgb ~9  Thrombocytopenia ~  Elevated Alk Phos 134  Elevated transaminitis    Lipid panel wnl    Prealbumin 12.14 (low)    (elevated)    Hep B surface antibody positive   Hep B core IgG negative   Hep C IgG positive   Hep A IgG positive    Free kappa/ lambda ration 92 (high)  Free lamda serum 97.3 (high)   Protein Electrophoresis showed elevated alpha 1 globulin, alpha 2 globulin, and gamma globulin  and beta 2 microglobulin   Immunofixation showed elevated IgG- a faint lambda band is present against a dense polyclonal background. While this process may represent a reactive/ inflammatory process, a developing plasma cell disorder cannot be excluded.    Pelvic mass: findings suggestive of pancreatobiliary or lung primary    CT Lumbar Spine w/o contrast: large soft tissue mass with bony destruction of the left sacrum and left ilium with pathologic fracture . Soft tissue mass does compress the left exiting nerve roots of the sacrum.    CT A/P w/contrast: evident of liver cirrhosis. splenomegaly, portal hypertension, esophageal varices, 3 cm greatest diameter mass lesion the right lobe of the liver inferiorly suggesting primary liver neoplasm. RP lymphadenopathy. Large left iliacus muscle mass with adjacent lytic lesion of the left side of the sacrum. left iliac  bone pathologic fracture. The right acetabulum pathologic fracture.    CT Chest: no evidence of mets    Bone Scan: small nonspecific focus of radiotracer uptake in right anterior 3rd rib. osteopenic defects in left iliac bine and sacrum and right acetabulum corresponding to lytic lesions

## 2018-02-03 NOTE — DISCHARGE NOTE ADULT - PLAN OF CARE
follow up You had a fracture of your hip due to metastatic disease from your cancer. Orthopaedic surgery ______ You have an active hepatitis C infection. Hepatology was consulted who recommended _______. Please follow up with them after discharge. You have a malignancy that has metastasized to your bones. Biopsy results showed _______. Please follow up with your oncologist Dr. Donovan after discharge. You had a fracture of your hip due to metastatic disease from your cancer. Orthopaedic surgery ordered imaging to evaluate your fractures and determined that ____ Follow up You have an active hepatitis C infection. Hepatology was consulted who recommended . Please follow up with them after discharge. You had a fracture of your hip due to metastatic disease from your cancer. Orthopedic surgery ordered imaging to evaluate your fractures and determined that ____ You had a fracture of your hip due to metastatic disease from your cancer. You were admitted for a fracture that was caused by metastatic disease from an unknown primary cancer. You were evaluated by orthopedics who determined that surgery was more risk than would be benefit due to the nature of the metastatic disease and being that you did not want radiation therapy. It was determined that you would not benefit from chemotherapy. You decided with Dr. Melgar that you would like to continue with inpatient hospice at [facility] for pain management and you signed a MOLST form stating that you would not want heroic measures. You have an active hepatitis C infection. Hepatology was consulted who recommended . No further treatment was offer as it would cause more harm than benefit due to the overall medical condition. You were admitted for a fracture that was caused by metastatic disease from an unknown primary cancer.  The biopsy results came back as poorly differentiated adenocarcinoma of unknown primary. It was determined that you would not benefit from chemotherapy. You decided with Dr. Melgar that you would like to continue with inpatient hospice at [facility] for pain management and you signed a MOLST form stating that you would not want heroic measures. You decided with Dr. Melgar that you would like to continue with inpatient hospice at [facility] for pain management and you signed a MOLST form stating that you would not want heroic measures. Due to the extensive metastatic disease you have significant pain requiring constant medication for pain control. You will continue your management at [facility]. You did not have any complications from cirrhosis likely secondary to hepatitis C. No further treatment was offer as it would cause more harm than benefit due to the overall medical condition. You will be monitored for worsening symptoms at [facility], but are not on any current medications. You have low protein likely due to the extensive metastatic cancer which requires dietary supplementation. Please continue your Enliven supplementation as directed.

## 2018-02-03 NOTE — H&P ADULT - ASSESSMENT
48 yo Male with hx of drug abuse, hepatitis C, chronic back pain, possible new diagnosis of hepatocellular carcinoma transferred from outside hospital for pathologic fractures of left ilium, right acetabulum, and left sacrum.

## 2018-02-03 NOTE — CONSULT NOTE ADULT - ASSESSMENT
Impression:  1)Abnormal liver tests- in setting of suspect liver mass and HCV possible abnormal liver tests secondary hepatocellular carcinoma, cholangiocarcinoma, viral hepatitis, HIV, DILI, IBRAHIM  2)Positive HCV antibody  3)Pathologic fractures- likely secondary to metastatic disease    Plan:  -check HAV AB, HBsAG, anti-HBs, anti-HBc IgM and IgG, HCV viral load PCR and HCV genotype, HEV AB  -check HIV  -check tumor marker Ca 19-9  -perform MRI/MRCP abdomen with liver protocol  -trend liver enzymes daily   -f/u orthopedic surgery recs  -please obtain imaging and biopsy results from OSH    Please call with questions  Aundrea Pulliam  GI Fellow  Pager: [phone]

## 2018-02-03 NOTE — H&P ADULT - NSHPPHYSICALEXAM_GEN_ALL_CORE
PHYSICAL EXAM:  GENERAL: NAD, cachectic   HEAD:  Atraumatic, Normocephalic  EYES: EOMI, conjunctiva and sclera clear  ENMT: Moist mucous membranes, No lesions  CHEST/LUNG: Clear to percussion bilaterally; No rales, rhonchi, wheezing, or rubs  HEART: Regular rate and rhythm; No murmurs, rubs, or gallops  ABDOMEN: Soft, Nontender, Nondistended; Bowel sounds present; splenomegaly   EXTREMITIES:  2+ Peripheral Pulses, No clubbing, cyanosis, or edema  SKIN: track marks on bilateral upper extremities   NERVOUS SYSTEM:  Alert & Oriented X3, Good concentration; Motor Strength 5/5 B/L upper and lower extremities Vital Signs Last 24 Hrs  T(C): 37.2 (02 Feb 2018 23:17), Max: 37.2 (02 Feb 2018 23:17)  T(F): 98.9 (02 Feb 2018 23:17), Max: 98.9 (02 Feb 2018 23:17)  HR: 80 (02 Feb 2018 23:17) (80 - 80)  BP: 120/79 (02 Feb 2018 23:17) (120/79 - 120/79)  BP(mean): --  RR: 18 (02 Feb 2018 23:17) (18 - 18)  SpO2: 100% (02 Feb 2018 23:17) (100% - 100%)    GENERAL: NAD, cachectic   HEAD:  Atraumatic, Normocephalic  EYES: EOMI, conjunctiva and sclera clear  ENMT: Moist mucous membranes, No lesions  CHEST/LUNG: Clear to percussion bilaterally; No rales, rhonchi, wheezing, or rubs, normal resp effort  HEART: Regular rate and rhythm; No murmurs, rubs, or gallops, no edema  ABDOMEN: Soft, Nontender, Nondistended; Bowel sounds present; splenomegaly   EXTREMITIES:  2+ Peripheral Pulses, No clubbing, cyanosis, or edema  SKIN: track marks on bilateral upper extremities, no rash  NERVOUS SYSTEM:  Alert & Oriented X3, Good concentration; Motor Strength 5/5 B/L upper and lower extremities

## 2018-02-03 NOTE — DISCHARGE NOTE ADULT - PATIENT PORTAL LINK FT
You can access the [facility] Patient Portal, offered by [facility], by registering with the following website: [email or web address]

## 2018-02-03 NOTE — DISCHARGE NOTE ADULT - CARE PROVIDER_API CALL
Peng Tobin), Internal Medicine  [address] [address] [place] [address]  Phone: [phone]  Fax: [phone]

## 2018-02-03 NOTE — PROGRESS NOTE ADULT - PROBLEM SELECTOR PLAN 4
s/p detox.   - Social work consult  - Will obtain medication list of what he was receiving in the prior hospital

## 2018-02-03 NOTE — H&P ADULT - PROBLEM SELECTOR PLAN 4
s/p detox.   - Social work consult s/p detox.   - Social work consult  - Will obtain medication list of what he was receiving in the prior hospital

## 2018-02-04 LAB
ALBUMIN SERPL ELPH-MCNC: 3 G/DL — LOW (ref 3.3–5)
ALP SERPL-CCNC: 130 U/L — HIGH (ref 40–120)
ALT FLD-CCNC: 94 U/L — HIGH (ref 4–41)
AST SERPL-CCNC: 151 U/L — HIGH (ref 4–40)
BILIRUB SERPL-MCNC: 0.6 MG/DL — SIGNIFICANT CHANGE UP (ref 0.2–1.2)
BUN SERPL-MCNC: 10 MG/DL — SIGNIFICANT CHANGE UP (ref 7–23)
CALCIUM SERPL-MCNC: 7.9 MG/DL — LOW (ref 8.4–10.5)
CHLORIDE SERPL-SCNC: 102 MMOL/L — SIGNIFICANT CHANGE UP (ref 98–107)
CO2 SERPL-SCNC: 23 MMOL/L — SIGNIFICANT CHANGE UP (ref 22–31)
CREAT SERPL-MCNC: 0.55 MG/DL — SIGNIFICANT CHANGE UP (ref 0.5–1.3)
GLUCOSE SERPL-MCNC: 85 MG/DL — SIGNIFICANT CHANGE UP (ref 70–99)
HCT VFR BLD CALC: 30.6 % — LOW (ref 39–50)
HCV RNA SERPL NAA DL=5-ACNC: HIGH IU/ML
HCV RNA SPEC NAA+PROBE-LOG IU: 6.37 LOGIU/ML — HIGH
HGB BLD-MCNC: 10.1 G/DL — LOW (ref 13–17)
HIV 1+2 AB+HIV1 P24 AG SERPL QL IA: SIGNIFICANT CHANGE UP
INR BLD: 1.1 — SIGNIFICANT CHANGE UP (ref 0.88–1.17)
MAGNESIUM SERPL-MCNC: 1.8 MG/DL — SIGNIFICANT CHANGE UP (ref 1.6–2.6)
MCHC RBC-ENTMCNC: 28 PG — SIGNIFICANT CHANGE UP (ref 27–34)
MCHC RBC-ENTMCNC: 33 % — SIGNIFICANT CHANGE UP (ref 32–36)
MCV RBC AUTO: 84.8 FL — SIGNIFICANT CHANGE UP (ref 80–100)
NRBC # FLD: 0 — SIGNIFICANT CHANGE UP
PHOSPHATE SERPL-MCNC: 2.7 MG/DL — SIGNIFICANT CHANGE UP (ref 2.5–4.5)
PLATELET # BLD AUTO: 130 K/UL — LOW (ref 150–400)
PMV BLD: 11.3 FL — SIGNIFICANT CHANGE UP (ref 7–13)
POTASSIUM SERPL-MCNC: 4 MMOL/L — SIGNIFICANT CHANGE UP (ref 3.5–5.3)
POTASSIUM SERPL-SCNC: 4 MMOL/L — SIGNIFICANT CHANGE UP (ref 3.5–5.3)
PROT SERPL-MCNC: 8.7 G/DL — HIGH (ref 6–8.3)
PROT UR-MCNC: 11.6 MG/DL — SIGNIFICANT CHANGE UP
PROTHROM AB SERPL-ACNC: 12.7 SEC — SIGNIFICANT CHANGE UP (ref 9.8–13.1)
RBC # BLD: 3.61 M/UL — LOW (ref 4.2–5.8)
RBC # FLD: 15 % — HIGH (ref 10.3–14.5)
SODIUM SERPL-SCNC: 136 MMOL/L — SIGNIFICANT CHANGE UP (ref 135–145)
WBC # BLD: 6.94 K/UL — SIGNIFICANT CHANGE UP (ref 3.8–10.5)
WBC # FLD AUTO: 6.94 K/UL — SIGNIFICANT CHANGE UP (ref 3.8–10.5)

## 2018-02-04 PROCEDURE — 99233 SBSQ HOSP IP/OBS HIGH 50: CPT | Mod: GC

## 2018-02-04 PROCEDURE — 72190 X-RAY EXAM OF PELVIS: CPT | Mod: 26

## 2018-02-04 PROCEDURE — 72110 X-RAY EXAM L-2 SPINE 4/>VWS: CPT | Mod: 26

## 2018-02-04 PROCEDURE — 72131 CT LUMBAR SPINE W/O DYE: CPT | Mod: 26

## 2018-02-04 PROCEDURE — 84165 PROTEIN E-PHORESIS SERUM: CPT | Mod: 26

## 2018-02-04 PROCEDURE — 99222 1ST HOSP IP/OBS MODERATE 55: CPT

## 2018-02-04 PROCEDURE — 72192 CT PELVIS W/O DYE: CPT | Mod: 26

## 2018-02-04 PROCEDURE — 73552 X-RAY EXAM OF FEMUR 2/>: CPT | Mod: 26,50

## 2018-02-04 PROCEDURE — 84166 PROTEIN E-PHORESIS/URINE/CSF: CPT | Mod: 26

## 2018-02-04 RX ADMIN — GABAPENTIN 300 MILLIGRAM(S): 400 CAPSULE ORAL at 13:15

## 2018-02-04 RX ADMIN — OXYCODONE HYDROCHLORIDE 10 MILLIGRAM(S): 5 TABLET ORAL at 14:00

## 2018-02-04 RX ADMIN — HYDROMORPHONE HYDROCHLORIDE 3 MILLIGRAM(S): 2 INJECTION INTRAMUSCULAR; INTRAVENOUS; SUBCUTANEOUS at 23:35

## 2018-02-04 RX ADMIN — HYDROMORPHONE HYDROCHLORIDE 3 MILLIGRAM(S): 2 INJECTION INTRAMUSCULAR; INTRAVENOUS; SUBCUTANEOUS at 18:55

## 2018-02-04 RX ADMIN — HYDROMORPHONE HYDROCHLORIDE 3 MILLIGRAM(S): 2 INJECTION INTRAMUSCULAR; INTRAVENOUS; SUBCUTANEOUS at 01:00

## 2018-02-04 RX ADMIN — GABAPENTIN 300 MILLIGRAM(S): 400 CAPSULE ORAL at 05:02

## 2018-02-04 RX ADMIN — OXYCODONE HYDROCHLORIDE 10 MILLIGRAM(S): 5 TABLET ORAL at 22:17

## 2018-02-04 RX ADMIN — Medication 1 TABLET(S): at 13:13

## 2018-02-04 RX ADMIN — HYDROMORPHONE HYDROCHLORIDE 3 MILLIGRAM(S): 2 INJECTION INTRAMUSCULAR; INTRAVENOUS; SUBCUTANEOUS at 05:01

## 2018-02-04 RX ADMIN — HYDROMORPHONE HYDROCHLORIDE 3 MILLIGRAM(S): 2 INJECTION INTRAMUSCULAR; INTRAVENOUS; SUBCUTANEOUS at 11:13

## 2018-02-04 RX ADMIN — OXYCODONE HYDROCHLORIDE 10 MILLIGRAM(S): 5 TABLET ORAL at 21:17

## 2018-02-04 RX ADMIN — HYDROMORPHONE HYDROCHLORIDE 3 MILLIGRAM(S): 2 INJECTION INTRAMUSCULAR; INTRAVENOUS; SUBCUTANEOUS at 14:45

## 2018-02-04 RX ADMIN — HYDROMORPHONE HYDROCHLORIDE 3 MILLIGRAM(S): 2 INJECTION INTRAMUSCULAR; INTRAVENOUS; SUBCUTANEOUS at 04:46

## 2018-02-04 RX ADMIN — OXYCODONE HYDROCHLORIDE 10 MILLIGRAM(S): 5 TABLET ORAL at 13:13

## 2018-02-04 RX ADMIN — HYDROMORPHONE HYDROCHLORIDE 3 MILLIGRAM(S): 2 INJECTION INTRAMUSCULAR; INTRAVENOUS; SUBCUTANEOUS at 23:50

## 2018-02-04 RX ADMIN — HYDROMORPHONE HYDROCHLORIDE 3 MILLIGRAM(S): 2 INJECTION INTRAMUSCULAR; INTRAVENOUS; SUBCUTANEOUS at 14:59

## 2018-02-04 RX ADMIN — OXYCODONE HYDROCHLORIDE 10 MILLIGRAM(S): 5 TABLET ORAL at 05:54

## 2018-02-04 RX ADMIN — Medication 10 MILLIGRAM(S): at 05:02

## 2018-02-04 RX ADMIN — Medication 1 TABLET(S): at 13:14

## 2018-02-04 RX ADMIN — HYDROMORPHONE HYDROCHLORIDE 3 MILLIGRAM(S): 2 INJECTION INTRAMUSCULAR; INTRAVENOUS; SUBCUTANEOUS at 00:44

## 2018-02-04 RX ADMIN — Medication 10 MILLIGRAM(S): at 18:28

## 2018-02-04 RX ADMIN — HYDROMORPHONE HYDROCHLORIDE 3 MILLIGRAM(S): 2 INJECTION INTRAMUSCULAR; INTRAVENOUS; SUBCUTANEOUS at 10:25

## 2018-02-04 RX ADMIN — HYDROMORPHONE HYDROCHLORIDE 3 MILLIGRAM(S): 2 INJECTION INTRAMUSCULAR; INTRAVENOUS; SUBCUTANEOUS at 18:21

## 2018-02-04 RX ADMIN — GABAPENTIN 300 MILLIGRAM(S): 400 CAPSULE ORAL at 21:17

## 2018-02-04 NOTE — PROGRESS NOTE ADULT - PROBLEM SELECTOR PLAN 2
As per outside records, positive IgG. Complicated by liver cirrhosis, esophageal varices, portal HTN. Outside labs show thrombocytopenia,, transaminitis,, and elevated alk phos, synthetic function preserved (INR wnl)  - f/u PCR and genotype  - Workup per hepatology pending

## 2018-02-04 NOTE — PROGRESS NOTE ADULT - PROBLEM SELECTOR PLAN 5
-RCRI score of 0 with no cardiac risk factors, EKG NSR. Patient prior to fractures, had no SOB/BREEN, CP/angina, LOC when climbing a flight of stairs.   - no cardiac limitations to exercise or functional capacity   -Pt. is medically optimized for OR with no further work-up indicated -RCRI score of 0 with no cardiac risk factors, EKG NSR. Patient prior to fractures, had no SOB/BREEN, CP/angina, LOC when climbing a flight of stairs.   - no cardiac limitations to exercise or functional capacity   - continue perioperative propranolol.  -Pt. is medically optimized for OR with no further work-up indicated

## 2018-02-04 NOTE — PROGRESS NOTE ADULT - PROBLEM SELECTOR PLAN 1
left ilium, right acetabulum, and left sacrum in setting of newly diagnosed malignancy- primary most likely HCC  - contact orthopaedic surgery (Dr. Back)  - Pain control with oxycodone 10 q8hrs (per pt, need med rec from OSH)  - Bowel regimen   - Neuro checks q8hrs left ilium, right acetabulum, and left sacrum in setting of newly diagnosed malignancy- primary most likely HCC  - contact orthopaedic surgery (Dr. Back)  - Pain control with oxycodone 10 q8hrs (per pt, need med rec from OSH)  - Bowel regimen

## 2018-02-04 NOTE — PROGRESS NOTE ADULT - SUBJECTIVE AND OBJECTIVE BOX
Patient is a 47y old  Male who presents with a chief complaint of hip pain (03 Feb 2018 12:32)    SUBJECTIVE / OVERNIGHT EVENTS: No events overnight. Pain controlled with current meds. Unable to bear weight. Denies chest pain, SOB, nausea, vomiting, fevers, sweats or chills.      MEDICATIONS  (STANDING):  calcium carbonate 1250 mG + Vitamin D (OsCal 500 + D) 1 Tablet(s) Oral daily  gabapentin 300 milliGRAM(s) Oral every 8 hours  oxyCODONE    IR 10 milliGRAM(s) Oral every 8 hours  potassium acid phosphate/sodium acid phosphate tablet (K-PHOS No. 2) 1 Tablet(s) Oral four times a day with meals  propranolol 10 milliGRAM(s) Oral every 12 hours    MEDICATIONS  (PRN):  HYDROmorphone  Injectable 3 milliGRAM(s) IV Push every 4 hours PRN Severe Pain (7 - 10)  polyethylene glycol 3350 17 Gram(s) Oral daily PRN Constipation      Vital Signs Last 24 Hrs  T(C): 37 (04 Feb 2018 04:48), Max: 37.1 (03 Feb 2018 14:45)  T(F): 98.6 (04 Feb 2018 04:48), Max: 98.8 (03 Feb 2018 14:45)  HR: 81 (04 Feb 2018 04:48) (80 - 84)  BP: 118/85 (04 Feb 2018 04:48) (107/74 - 118/85)  BP(mean): --  RR: 18 (04 Feb 2018 04:48) (16 - 18)  SpO2: 100% (04 Feb 2018 04:48) (98% - 100%)  CAPILLARY BLOOD GLUCOSE        I&O's Summary        PHYSICAL EXAM  GENERAL: NAD, well-developed  HEAD:  Atraumatic, Normocephalic  EYES: EOMI, conjunctiva and sclera clear  NECK: Supple, No JVD  CHEST/LUNG: Clear to auscultation bilaterally; No wheeze  HEART: Regular rate and rhythm; No murmurs, rubs, or gallops  ABDOMEN: Soft, Nontender, Nondistended  EXTREMITIES:  Restricted ROM due to pain in LE. L>R  PSYCH: AAOx3  SKIN: No rashes or lesions    LABS:                        10.1   6.94  )-----------( 130      ( 04 Feb 2018 06:10 )             30.6     02-04    136  |  102  |  10  ----------------------------<  85  4.0   |  23  |  0.55    Ca    7.9<L>      04 Feb 2018 06:10  Phos  2.7     02-04  Mg     1.8     02-04    TPro  8.7<H>  /  Alb  3.0<L>  /  TBili  0.6  /  DBili  x   /  AST  151<H>  /  ALT  94<H>  /  AlkPhos  130<H>  02-04    PT/INR - ( 04 Feb 2018 06:10 )   PT: 12.7 SEC;   INR: 1.10          PTT - ( 03 Feb 2018 06:35 )  PTT:33.8 SEC          RADIOLOGY & ADDITIONAL TESTS:    Imaging Personally Reviewed:  Consultant(s) Notes Reviewed:    Care Discussed with Consultants/Other Providers: Patient is a 47y old  Male who presents with a chief complaint of hip pain (03 Feb 2018 12:32)    SUBJECTIVE / OVERNIGHT EVENTS: No events overnight. Pain controlled with current meds. Unable to bear weight. Denies chest pain, SOB, nausea, vomiting, fevers, sweats or chills.      MEDICATIONS  (STANDING):  calcium carbonate 1250 mG + Vitamin D (OsCal 500 + D) 1 Tablet(s) Oral daily  gabapentin 300 milliGRAM(s) Oral every 8 hours  oxyCODONE    IR 10 milliGRAM(s) Oral every 8 hours  potassium acid phosphate/sodium acid phosphate tablet (K-PHOS No. 2) 1 Tablet(s) Oral four times a day with meals  propranolol 10 milliGRAM(s) Oral every 12 hours    MEDICATIONS  (PRN):  HYDROmorphone  Injectable 3 milliGRAM(s) IV Push every 4 hours PRN Severe Pain (7 - 10)  polyethylene glycol 3350 17 Gram(s) Oral daily PRN Constipation      Vital Signs Last 24 Hrs  T(C): 37 (04 Feb 2018 04:48), Max: 37.1 (03 Feb 2018 14:45)  T(F): 98.6 (04 Feb 2018 04:48), Max: 98.8 (03 Feb 2018 14:45)  HR: 81 (04 Feb 2018 04:48) (80 - 84)  BP: 118/85 (04 Feb 2018 04:48) (107/74 - 118/85)  BP(mean): --  RR: 18 (04 Feb 2018 04:48) (16 - 18)  SpO2: 100% (04 Feb 2018 04:48) (98% - 100%)  CAPILLARY BLOOD GLUCOSE        I&O's Summary        PHYSICAL EXAM  GENERAL: NAD, well-developed  HEAD:  Atraumatic, Normocephalic  EYES: EOMI, conjunctiva and sclera clear  NECK: Supple, No JVD  CHEST/LUNG: Clear to auscultation bilaterally; No wheeze  HEART: Regular rate and rhythm; No murmurs, rubs, or gallops  ABDOMEN: Soft, Nontender, Nondistended  EXTREMITIES:  Restricted ROM due to pain in LE. L>R  PSYCH: AAOx3  SKIN: No rashes or lesions    LABS:                        10.1   6.94  )-----------( 130      ( 04 Feb 2018 06:10 )             30.6     02-04    136  |  102  |  10  ----------------------------<  85  4.0   |  23  |  0.55    Ca    7.9<L>      04 Feb 2018 06:10  Phos  2.7     02-04  Mg     1.8     02-04    TPro  8.7<H>  /  Alb  3.0<L>  /  TBili  0.6  /  DBili  x   /  AST  151<H>  /  ALT  94<H>  /  AlkPhos  130<H>  02-04    PT/INR - ( 04 Feb 2018 06:10 )   PT: 12.7 SEC;   INR: 1.10          PTT - ( 03 Feb 2018 06:35 )  PTT:33.8 SEC    EKG - sinus rhythm, no ST elevation, depressions, T wave inversion.      RADIOLOGY & ADDITIONAL TESTS:    Imaging Personally Reviewed:  Consultant(s) Notes Reviewed:    Care Discussed with Consultants/Other Providers:

## 2018-02-04 NOTE — CONSULT NOTE ADULT - SUBJECTIVE AND OBJECTIVE BOX
48 yo Male with hx of drug abuse, hepatitis C, chronic back pain, possible new diagnosis of hepatocellular carcinoma transferred from outside hospital decreased ability to ambulate and L ilium/sacrum and R acetabulum lesions. Patient initially presented to the hospital two weeks ago for worsening lower back and left hip pain. He was diagnosed with hepatitic C, but never received treatment. Community ambulator w/o assistive devices. Denies HS/LOC. Denies fever/chills. Pain is worse on the left than the right. Pain only present when attempting to ambulate. Has been getting worse over the past month now unable to ambulate 2/2 pain.    PAST MEDICAL & SURGICAL HISTORY:  IV drug abuse  Hepatitis C  No significant past surgical history    MEDICATIONS  (STANDING):  calcium carbonate 1250 mG + Vitamin D (OsCal 500 + D) 1 Tablet(s) Oral daily  gabapentin 300 milliGRAM(s) Oral every 8 hours  oxyCODONE    IR 10 milliGRAM(s) Oral every 8 hours  potassium acid phosphate/sodium acid phosphate tablet (K-PHOS No. 2) 1 Tablet(s) Oral four times a day with meals  propranolol 10 milliGRAM(s) Oral every 12 hours    Allergies    No Known Allergies    Intolerances                            10.1   6.94  )-----------( 130      ( 04 Feb 2018 06:10 )             30.6     04 Feb 2018 06:10    136    |  102    |  10     ----------------------------<  85     4.0     |  23     |  0.55     Ca    7.9        04 Feb 2018 06:10  Phos  2.7       04 Feb 2018 06:10  Mg     1.8       04 Feb 2018 06:10    TPro  8.7    /  Alb  3.0    /  TBili  0.6    /  DBili  x      /  AST  151    /  ALT  94     /  AlkPhos  130    04 Feb 2018 06:10    PT/INR - ( 04 Feb 2018 06:10 )   PT: 12.7 SEC;   INR: 1.10          PTT - ( 03 Feb 2018 06:35 )  PTT:33.8 SEC  Vital Signs Last 24 Hrs  T(C): 37 (02-04-18 @ 04:48), Max: 37.1 (02-03-18 @ 14:45)  T(F): 98.6 (02-04-18 @ 04:48), Max: 98.8 (02-03-18 @ 14:45)  HR: 81 (02-04-18 @ 04:48) (80 - 84)  BP: 118/85 (02-04-18 @ 04:48) (107/74 - 118/85)  BP(mean): --  RR: 18 (02-04-18 @ 04:48) (16 - 18)  SpO2: 100% (02-04-18 @ 04:48) (98% - 100%)    Imaging: in house imaging pending    outside hospital image findings:  Labs, Radiology, Cardiology, and Other Results: Reviewed outside hospital paperwork.   On outside record labs:   Normocytic anemia- Hgb ~9  Thrombocytopenia ~  Elevated Alk Phos 134  Elevated transaminitis    Lipid panel wnl    Prealbumin 12.14 (low)    (elevated)    Hep B surface antibody positive   Hep B core IgG negative   Hep C IgG positive   Hep A IgG positive    Free kappa/ lambda ration 92 (high)  Free lamda serum 97.3 (high)   Protein Electrophoresis showed elevated alpha 1 globulin, alpha 2 globulin, and gamma globulin  and beta 2 microglobulin   Immunofixation showed elevated IgG- a faint lambda band is present against a dense polyclonal background. While this process may represent a reactive/ inflammatory process, a developing plasma cell disorder cannot be excluded.    Pelvic mass: findings suggestive of pancreatobiliary or lung primary    CT Lumbar Spine w/o contrast: large soft tissue mass with bony destruction of the left sacrum and left ilium with pathologic fracture . Soft tissue mass does compress the left exiting nerve roots of the sacrum.    CT A/P w/contrast: evident of liver cirrhosis. splenomegaly, portal hypertension, esophageal varices, 3 cm greatest diameter mass lesion the right lobe of the liver inferiorly suggesting primary liver neoplasm. RP lymphadenopathy. Large left iliacus muscle mass with adjacent lytic lesion of the left side of the sacrum. left iliac  bone pathologic fracture. The right acetabulum pathologic fracture.    CT Chest: no evidence of mets    Bone Scan: small nonspecific focus of radiotracer uptake in right anterior 3rd rib. osteopenic defects in left iliac bine and sacrum and right acetabulum corresponding to lytic lesions	      Physical Exam  Gen: NAD  RLE: skin intact, no LLD, able to SLR, negative log roll, nonttp hip/groin, non ttp over SI joint, no ttp elsewhere, +ehl/fhl/ta/gs function, no calf ttp, dp/pt pulse intact, compartments soft. Hip ROM 0-110, IR 5, ER 45    LLE: skin intact, no LLD, able to SLR, negative log roll, nonttp hip/groin, non ttp over SI joint, no ttp elsewhere, 5/5 +ehl/fhl/ta/gs function SILT, no calf ttp, dp/pt pulse intact, compartments soft. Hip ROM 0-110, IR 15, ER 45    Secondary survey: benign, nv intact, no bony ttp elsewhere, bilateral upper extremity skin intact with no gross deformity, non tender to palpation over bony prominences, neurovascularly intact    A/P: 47y Male with inability to ambulate x1week with L ilum/sacrum lesion and R acetabulum lesion  Pain control  NWB BL LE bedrest  FU MRI; FU CT; FU XR; FU SPEP/UPEP ESR/CRP/LDH/PSA  FU onc consult  Recommend radiation oncology consult  FU hepatology consult  once imaging and work up complete will eval for radiation therapy alone versus need for orthopaedic surgery intervention prior to radiation therapy  would need medical clearance if OR necessary  will discuss with attending 46 yo Male with hx of drug abuse, hepatitis C, chronic back pain, possible new diagnosis of hepatocellular carcinoma transferred from outside hospital decreased ability to ambulate and L ilium/sacrum and R acetabulum lesions. Patient initially presented to the hospital two weeks ago for worsening lower back and left hip pain. He was diagnosed with hepatitic C, but never received treatment. Community ambulator w/o assistive devices. Denies HS/LOC. Denies fever/chills. Pain is worse on the left than the right. intermittent numbness in his left leg/foot not currently present. Pain only present when attempting to ambulate. Has been getting worse over the past month now unable to ambulate 2/2 pain.    PAST MEDICAL & SURGICAL HISTORY:  IV drug abuse  Hepatitis C  No significant past surgical history    MEDICATIONS  (STANDING):  calcium carbonate 1250 mG + Vitamin D (OsCal 500 + D) 1 Tablet(s) Oral daily  gabapentin 300 milliGRAM(s) Oral every 8 hours  oxyCODONE    IR 10 milliGRAM(s) Oral every 8 hours  potassium acid phosphate/sodium acid phosphate tablet (K-PHOS No. 2) 1 Tablet(s) Oral four times a day with meals  propranolol 10 milliGRAM(s) Oral every 12 hours    Allergies    No Known Allergies    Intolerances                            10.1   6.94  )-----------( 130      ( 04 Feb 2018 06:10 )             30.6     04 Feb 2018 06:10    136    |  102    |  10     ----------------------------<  85     4.0     |  23     |  0.55     Ca    7.9        04 Feb 2018 06:10  Phos  2.7       04 Feb 2018 06:10  Mg     1.8       04 Feb 2018 06:10    TPro  8.7    /  Alb  3.0    /  TBili  0.6    /  DBili  x      /  AST  151    /  ALT  94     /  AlkPhos  130    04 Feb 2018 06:10    PT/INR - ( 04 Feb 2018 06:10 )   PT: 12.7 SEC;   INR: 1.10          PTT - ( 03 Feb 2018 06:35 )  PTT:33.8 SEC  Vital Signs Last 24 Hrs  T(C): 37 (02-04-18 @ 04:48), Max: 37.1 (02-03-18 @ 14:45)  T(F): 98.6 (02-04-18 @ 04:48), Max: 98.8 (02-03-18 @ 14:45)  HR: 81 (02-04-18 @ 04:48) (80 - 84)  BP: 118/85 (02-04-18 @ 04:48) (107/74 - 118/85)  BP(mean): --  RR: 18 (02-04-18 @ 04:48) (16 - 18)  SpO2: 100% (02-04-18 @ 04:48) (98% - 100%)    Imaging: in house imaging pending    outside hospital image findings:  Labs, Radiology, Cardiology, and Other Results: Reviewed outside hospital paperwork.   On outside record labs:   Normocytic anemia- Hgb ~9  Thrombocytopenia ~  Elevated Alk Phos 134  Elevated transaminitis    Lipid panel wnl    Prealbumin 12.14 (low)    (elevated)    Hep B surface antibody positive   Hep B core IgG negative   Hep C IgG positive   Hep A IgG positive    Free kappa/ lambda ration 92 (high)  Free lamda serum 97.3 (high)   Protein Electrophoresis showed elevated alpha 1 globulin, alpha 2 globulin, and gamma globulin  and beta 2 microglobulin   Immunofixation showed elevated IgG- a faint lambda band is present against a dense polyclonal background. While this process may represent a reactive/ inflammatory process, a developing plasma cell disorder cannot be excluded.    Pelvic mass: findings suggestive of pancreatobiliary or lung primary    CT Lumbar Spine w/o contrast: large soft tissue mass with bony destruction of the left sacrum and left ilium with pathologic fracture . Soft tissue mass does compress the left exiting nerve roots of the sacrum.    CT A/P w/contrast: evident of liver cirrhosis. splenomegaly, portal hypertension, esophageal varices, 3 cm greatest diameter mass lesion the right lobe of the liver inferiorly suggesting primary liver neoplasm. RP lymphadenopathy. Large left iliacus muscle mass with adjacent lytic lesion of the left side of the sacrum. left iliac  bone pathologic fracture. The right acetabulum pathologic fracture.    CT Chest: no evidence of mets    Bone Scan: small nonspecific focus of radiotracer uptake in right anterior 3rd rib. osteopenic defects in left iliac bine and sacrum and right acetabulum corresponding to lytic lesions	      Physical Exam  Gen: NAD  RLE: skin intact, no LLD, able to SLR, negative log roll, nonttp hip/groin, non ttp over SI joint, no ttp elsewhere, +ehl/fhl/ta/gs function, no calf ttp, dp/pt pulse intact, compartments soft. Hip ROM 0-110, IR 5, ER 45    LLE: skin intact, no LLD, able to SLR, negative log roll, nonttp hip/groin, non ttp over SI joint, no ttp elsewhere, 5/5 +ehl/fhl/ta/gs function SILT, no calf ttp, dp/pt pulse intact, compartments soft. Hip ROM 0-110, IR 15, ER 45    Secondary survey: benign, nv intact, no bony ttp elsewhere, bilateral upper extremity skin intact with no gross deformity, non tender to palpation over bony prominences, neurovascularly intact    A/P: 47y Male with inability to ambulate x1week with L ilum/sacrum lesion and R acetabulum lesion  Pain control  NWB BL LE bedrest  FU MRI; FU CT; FU XR; FU SPEP/UPEP ESR/CRP/LDH/PSA  FU onc consult  Recommend radiation oncology consult  FU hepatology consult  once imaging and work up complete will eval for radiation therapy alone versus need for orthopaedic surgery intervention prior to radiation therapy  would need medical clearance if OR necessary  will discuss with attending

## 2018-02-04 NOTE — PROGRESS NOTE ADULT - ATTENDING COMMENTS
Patient seen and examined.  Agree with resident note. 47M with heroin abuse, Hepatitis C untreated complicated by cirrhosis and ascites, suspected HCC with pathological fractures.  Plan for orthopedic intervention.  EKG is normal.  Patient is low cardiac risk for moderate risk procedure.  Will continue to follow up. Hepatology consult for HCV management.

## 2018-02-05 LAB
ALBUMIN SERPL ELPH-MCNC: 3 G/DL — LOW (ref 3.3–5)
ALP SERPL-CCNC: 140 U/L — HIGH (ref 40–120)
ALT FLD-CCNC: 100 U/L — HIGH (ref 4–41)
AST SERPL-CCNC: 157 U/L — HIGH (ref 4–40)
BILIRUB SERPL-MCNC: 0.6 MG/DL — SIGNIFICANT CHANGE UP (ref 0.2–1.2)
BUN SERPL-MCNC: 10 MG/DL — SIGNIFICANT CHANGE UP (ref 7–23)
CALCIUM SERPL-MCNC: 8.3 MG/DL — LOW (ref 8.4–10.5)
CANCER AG19-9 SERPL-ACNC: 81 U/ML — HIGH
CHLORIDE SERPL-SCNC: 98 MMOL/L — SIGNIFICANT CHANGE UP (ref 98–107)
CO2 SERPL-SCNC: 23 MMOL/L — SIGNIFICANT CHANGE UP (ref 22–31)
CREAT SERPL-MCNC: 0.64 MG/DL — SIGNIFICANT CHANGE UP (ref 0.5–1.3)
CRP SERPL-MCNC: 7.7 MG/L — HIGH
ERYTHROCYTE [SEDIMENTATION RATE] IN BLOOD: 100 MM/HR — HIGH (ref 1–15)
GAS PNL BLDMV: SIGNIFICANT CHANGE UP
GAS PNL BLDMV: SIGNIFICANT CHANGE UP
GLUCOSE SERPL-MCNC: 82 MG/DL — SIGNIFICANT CHANGE UP (ref 70–99)
HCT VFR BLD CALC: 34.2 % — LOW (ref 39–50)
HGB BLD-MCNC: 10.9 G/DL — LOW (ref 13–17)
INR BLD: 1.15 — SIGNIFICANT CHANGE UP (ref 0.88–1.17)
LDH SERPL L TO P-CCNC: 384 U/L — HIGH (ref 135–225)
MAGNESIUM SERPL-MCNC: 1.9 MG/DL — SIGNIFICANT CHANGE UP (ref 1.6–2.6)
MCHC RBC-ENTMCNC: 27.7 PG — SIGNIFICANT CHANGE UP (ref 27–34)
MCHC RBC-ENTMCNC: 31.9 % — LOW (ref 32–36)
MCV RBC AUTO: 86.8 FL — SIGNIFICANT CHANGE UP (ref 80–100)
NRBC # FLD: 0 — SIGNIFICANT CHANGE UP
PHOSPHATE SERPL-MCNC: 2.6 MG/DL — SIGNIFICANT CHANGE UP (ref 2.5–4.5)
PLATELET # BLD AUTO: 148 K/UL — LOW (ref 150–400)
PMV BLD: 11.4 FL — SIGNIFICANT CHANGE UP (ref 7–13)
POTASSIUM SERPL-MCNC: 4.2 MMOL/L — SIGNIFICANT CHANGE UP (ref 3.5–5.3)
POTASSIUM SERPL-SCNC: 4.2 MMOL/L — SIGNIFICANT CHANGE UP (ref 3.5–5.3)
PROT SERPL-MCNC: 9.3 G/DL — HIGH (ref 6–8.3)
PROTHROM AB SERPL-ACNC: 12.8 SEC — SIGNIFICANT CHANGE UP (ref 9.8–13.1)
RBC # BLD: 3.94 M/UL — LOW (ref 4.2–5.8)
RBC # FLD: 15.1 % — HIGH (ref 10.3–14.5)
SODIUM SERPL-SCNC: 134 MMOL/L — LOW (ref 135–145)
WBC # BLD: 7.84 K/UL — SIGNIFICANT CHANGE UP (ref 3.8–10.5)
WBC # FLD AUTO: 7.84 K/UL — SIGNIFICANT CHANGE UP (ref 3.8–10.5)

## 2018-02-05 PROCEDURE — 99222 1ST HOSP IP/OBS MODERATE 55: CPT | Mod: GC

## 2018-02-05 PROCEDURE — 99233 SBSQ HOSP IP/OBS HIGH 50: CPT | Mod: GC

## 2018-02-05 PROCEDURE — 99232 SBSQ HOSP IP/OBS MODERATE 35: CPT | Mod: GC

## 2018-02-05 PROCEDURE — 99222 1ST HOSP IP/OBS MODERATE 55: CPT

## 2018-02-05 RX ORDER — HYDROMORPHONE HYDROCHLORIDE 2 MG/ML
3 INJECTION INTRAMUSCULAR; INTRAVENOUS; SUBCUTANEOUS ONCE
Qty: 0 | Refills: 0 | Status: DISCONTINUED | OUTPATIENT
Start: 2018-02-05 | End: 2018-02-05

## 2018-02-05 RX ORDER — OXYCODONE HYDROCHLORIDE 5 MG/1
20 TABLET ORAL EVERY 12 HOURS
Qty: 0 | Refills: 0 | Status: DISCONTINUED | OUTPATIENT
Start: 2018-02-05 | End: 2018-02-05

## 2018-02-05 RX ORDER — OXYCODONE HYDROCHLORIDE 5 MG/1
20 TABLET ORAL EVERY 8 HOURS
Qty: 0 | Refills: 0 | Status: DISCONTINUED | OUTPATIENT
Start: 2018-02-05 | End: 2018-02-06

## 2018-02-05 RX ORDER — NALOXONE HYDROCHLORIDE 4 MG/.1ML
0.1 SPRAY NASAL
Qty: 0 | Refills: 0 | Status: DISCONTINUED | OUTPATIENT
Start: 2018-02-05 | End: 2018-02-16

## 2018-02-05 RX ORDER — DEXAMETHASONE 0.5 MG/5ML
10 ELIXIR ORAL ONCE
Qty: 0 | Refills: 0 | Status: COMPLETED | OUTPATIENT
Start: 2018-02-05 | End: 2018-02-05

## 2018-02-05 RX ORDER — OXYCODONE HYDROCHLORIDE 5 MG/1
10 TABLET ORAL THREE TIMES A DAY
Qty: 0 | Refills: 0 | Status: DISCONTINUED | OUTPATIENT
Start: 2018-02-05 | End: 2018-02-05

## 2018-02-05 RX ADMIN — HYDROMORPHONE HYDROCHLORIDE 3 MILLIGRAM(S): 2 INJECTION INTRAMUSCULAR; INTRAVENOUS; SUBCUTANEOUS at 20:19

## 2018-02-05 RX ADMIN — HYDROMORPHONE HYDROCHLORIDE 3 MILLIGRAM(S): 2 INJECTION INTRAMUSCULAR; INTRAVENOUS; SUBCUTANEOUS at 16:06

## 2018-02-05 RX ADMIN — GABAPENTIN 300 MILLIGRAM(S): 400 CAPSULE ORAL at 15:05

## 2018-02-05 RX ADMIN — HYDROMORPHONE HYDROCHLORIDE 3 MILLIGRAM(S): 2 INJECTION INTRAMUSCULAR; INTRAVENOUS; SUBCUTANEOUS at 20:34

## 2018-02-05 RX ADMIN — OXYCODONE HYDROCHLORIDE 20 MILLIGRAM(S): 5 TABLET ORAL at 20:41

## 2018-02-05 RX ADMIN — Medication 10 MILLIGRAM(S): at 19:41

## 2018-02-05 RX ADMIN — Medication 75 MILLIGRAM(S): at 21:33

## 2018-02-05 RX ADMIN — HYDROMORPHONE HYDROCHLORIDE 3 MILLIGRAM(S): 2 INJECTION INTRAMUSCULAR; INTRAVENOUS; SUBCUTANEOUS at 04:15

## 2018-02-05 RX ADMIN — HYDROMORPHONE HYDROCHLORIDE 3 MILLIGRAM(S): 2 INJECTION INTRAMUSCULAR; INTRAVENOUS; SUBCUTANEOUS at 12:05

## 2018-02-05 RX ADMIN — Medication 10 MILLIGRAM(S): at 06:21

## 2018-02-05 RX ADMIN — HYDROMORPHONE HYDROCHLORIDE 3 MILLIGRAM(S): 2 INJECTION INTRAMUSCULAR; INTRAVENOUS; SUBCUTANEOUS at 08:17

## 2018-02-05 RX ADMIN — OXYCODONE HYDROCHLORIDE 10 MILLIGRAM(S): 5 TABLET ORAL at 06:21

## 2018-02-05 RX ADMIN — HYDROMORPHONE HYDROCHLORIDE 3 MILLIGRAM(S): 2 INJECTION INTRAMUSCULAR; INTRAVENOUS; SUBCUTANEOUS at 11:53

## 2018-02-05 RX ADMIN — Medication 10 MILLIGRAM(S): at 19:40

## 2018-02-05 RX ADMIN — HYDROMORPHONE HYDROCHLORIDE 3 MILLIGRAM(S): 2 INJECTION INTRAMUSCULAR; INTRAVENOUS; SUBCUTANEOUS at 04:00

## 2018-02-05 RX ADMIN — HYDROMORPHONE HYDROCHLORIDE 3 MILLIGRAM(S): 2 INJECTION INTRAMUSCULAR; INTRAVENOUS; SUBCUTANEOUS at 08:35

## 2018-02-05 RX ADMIN — Medication 1 TABLET(S): at 15:06

## 2018-02-05 RX ADMIN — OXYCODONE HYDROCHLORIDE 20 MILLIGRAM(S): 5 TABLET ORAL at 19:41

## 2018-02-05 RX ADMIN — HYDROMORPHONE HYDROCHLORIDE 3 MILLIGRAM(S): 2 INJECTION INTRAMUSCULAR; INTRAVENOUS; SUBCUTANEOUS at 16:25

## 2018-02-05 RX ADMIN — OXYCODONE HYDROCHLORIDE 10 MILLIGRAM(S): 5 TABLET ORAL at 07:17

## 2018-02-05 RX ADMIN — GABAPENTIN 300 MILLIGRAM(S): 400 CAPSULE ORAL at 06:21

## 2018-02-05 NOTE — PROGRESS NOTE ADULT - ASSESSMENT
46 yo Male with hx of drug abuse, hepatitis C, chronic back pain, possible new diagnosis of hepatocellular carcinoma transferred from outside hospital for pathologic fractures of left ilium, right acetabulum, and left sacrum.

## 2018-02-05 NOTE — PROGRESS NOTE ADULT - SUBJECTIVE AND OBJECTIVE BOX
Patient is a 47y old  Male who presents with a chief complaint of hip pain (03 Feb 2018 12:32)      SUBJECTIVE / OVERNIGHT EVENTS:  Pt c/o 10/10 pain in both legs L>R. Unable to sleep overnight due to pain. Dilaudid 3 mg IV prn helps to decrease pain but still unbearable.     MEDICATIONS  (STANDING):  calcium carbonate 1250 mG + Vitamin D (OsCal 500 + D) 1 Tablet(s) Oral daily  gabapentin 300 milliGRAM(s) Oral every 8 hours  oxyCODONE    IR 10 milliGRAM(s) Oral every 8 hours  propranolol 10 milliGRAM(s) Oral every 12 hours    MEDICATIONS  (PRN):  HYDROmorphone  Injectable 3 milliGRAM(s) IV Push every 4 hours PRN Severe Pain (7 - 10)  polyethylene glycol 3350 17 Gram(s) Oral daily PRN Constipation        CAPILLARY BLOOD GLUCOSE        I&O's Summary      PHYSICAL EXAM:  GENERAL: NAD, well-developed  HEAD:  Atraumatic, Normocephalic  EYES: EOMI, conjunctiva and sclera clear  NECK: Supple, No JVD  CHEST/LUNG: Clear to auscultation bilaterally; No wheeze  HEART: Regular rate and rhythm; No murmurs, rubs, or gallops  ABDOMEN: Soft, Nontender, Nondistended  EXTREMITIES:  Restricted ROM due to pain in LE. L>R  PSYCH: AAOx3  SKIN: No rashes or lesions        LABS:                        10.9   7.84  )-----------( 148      ( 05 Feb 2018 06:05 )             34.2     02-05    134<L>  |  98  |  10  ----------------------------<  82  4.2   |  23  |  0.64    Ca    8.3<L>      05 Feb 2018 06:05  Phos  2.6     02-05  Mg     1.9     02-05    TPro  9.3<H>  /  Alb  3.0<L>  /  TBili  0.6  /  DBili  x   /  AST  157<H>  /  ALT  100<H>  /  AlkPhos  140<H>  02-05    PT/INR - ( 05 Feb 2018 06:05 )   PT: 12.8 SEC;   INR: 1.15                    RADIOLOGY & ADDITIONAL TESTS:    CT Lumbar spine 2/4:  IMPRESSION:      Lytic areas of bony destruction from tumor are again noted involving the   left iliac bone and left sacrum, with associated pathologic fractures,   similar in appearance compared to 01/21/2018 abdomen CT study. Please   note that the area of bony destruction is not fully included in the   field-of-view of this lumbar spine CT study. Please see separate   dedicated pelvic CT study from the same day.  Tumor encases the exiting   sacral nerve roots at these levels.      CT A/P 2/4:  ******PRELIMINARY REPORT******            INTERPRETATION:  Large areas of osteolysis are visualized in the left   sacrum, left ilium and right acetabulum compatible with osseous   metastatic disease given history of metastatic liver cancer. Soft tissue   component of an osteolytic lesion in the left sacrum is visualized   extending into the  visualized w/in the left upper sacral neural foramina. Patient is a 47y old  Male who presents with a chief complaint of hip pain (03 Feb 2018 12:32)      SUBJECTIVE / OVERNIGHT EVENTS:  Pt c/o 10/10 pain in both legs L>R. Unable to sleep overnight due to pain. Dilaudid 3 mg IV prn helps to decrease pain but still unbearable.     MEDICATIONS  (STANDING):  calcium carbonate 1250 mG + Vitamin D (OsCal 500 + D) 1 Tablet(s) Oral daily  gabapentin 300 milliGRAM(s) Oral every 8 hours  oxyCODONE    IR 10 milliGRAM(s) Oral every 8 hours  propranolol 10 milliGRAM(s) Oral every 12 hours    MEDICATIONS  (PRN):  HYDROmorphone  Injectable 3 milliGRAM(s) IV Push every 4 hours PRN Severe Pain (7 - 10)  polyethylene glycol 3350 17 Gram(s) Oral daily PRN Constipation        CAPILLARY BLOOD GLUCOSE        I&O's Summary      PHYSICAL EXAM:  VS: T 98.1, HR 76, /81, RR 18, SpO2 99%  GENERAL: NAD, well-developed  HEAD:  Atraumatic, Normocephalic  EYES: EOMI, conjunctiva and sclera clear  NECK: Supple, No JVD  CHEST/LUNG: Clear to auscultation bilaterally; No wheeze  HEART: Regular rate and rhythm; No murmurs, rubs, or gallops  ABDOMEN: Soft, Nontender, Nondistended  EXTREMITIES:  Restricted ROM due to pain in LE. L>R  PSYCH: AAOx3  SKIN: No rashes or lesions        LABS:                        10.9   7.84  )-----------( 148      ( 05 Feb 2018 06:05 )             34.2     02-05    134<L>  |  98  |  10  ----------------------------<  82  4.2   |  23  |  0.64    Ca    8.3<L>      05 Feb 2018 06:05  Phos  2.6     02-05  Mg     1.9     02-05    TPro  9.3<H>  /  Alb  3.0<L>  /  TBili  0.6  /  DBili  x   /  AST  157<H>  /  ALT  100<H>  /  AlkPhos  140<H>  02-05    PT/INR - ( 05 Feb 2018 06:05 )   PT: 12.8 SEC;   INR: 1.15                    RADIOLOGY & ADDITIONAL TESTS:    CT Lumbar spine 2/4:  IMPRESSION:      Lytic areas of bony destruction from tumor are again noted involving the   left iliac bone and left sacrum, with associated pathologic fractures,   similar in appearance compared to 01/21/2018 abdomen CT study. Please   note that the area of bony destruction is not fully included in the   field-of-view of this lumbar spine CT study. Please see separate   dedicated pelvic CT study from the same day.  Tumor encases the exiting   sacral nerve roots at these levels.      CT A/P 2/4:  ******PRELIMINARY REPORT******            INTERPRETATION:  Large areas of osteolysis are visualized in the left   sacrum, left ilium and right acetabulum compatible with osseous   metastatic disease given history of metastatic liver cancer. Soft tissue   component of an osteolytic lesion in the left sacrum is visualized   extending into the  visualized w/in the left upper sacral neural foramina. Patient is a 47y old  Male who presents with a chief complaint of hip pain (03 Feb 2018 12:32)    SUBJECTIVE / OVERNIGHT EVENTS:  Pt c/o 10/10 pain in both legs L>R. Unable to sleep overnight due to pain. Dilaudid 3 mg IV prn helps to decrease pain but still unbearable.     MEDICATIONS  (STANDING):  calcium carbonate 1250 mG + Vitamin D (OsCal 500 + D) 1 Tablet(s) Oral daily  gabapentin 300 milliGRAM(s) Oral every 8 hours  oxyCODONE    IR 10 milliGRAM(s) Oral every 8 hours  propranolol 10 milliGRAM(s) Oral every 12 hours    MEDICATIONS  (PRN):  HYDROmorphone  Injectable 3 milliGRAM(s) IV Push every 4 hours PRN Severe Pain (7 - 10)  polyethylene glycol 3350 17 Gram(s) Oral daily PRN Constipation    CAPILLARY BLOOD GLUCOSE    I&O's Summary    PHYSICAL EXAM:  VS: T 98.1, HR 76, /81, RR 18, SpO2 99%  GENERAL: NAD, well-developed  HEAD:  Atraumatic, Normocephalic  EYES: EOMI, conjunctiva and sclera clear  NECK: Supple, No JVD  CHEST/LUNG: Clear to auscultation bilaterally; No wheeze  HEART: Regular rate and rhythm; No murmurs, rubs, or gallops  ABDOMEN: Soft, Nontender, Nondistended  EXTREMITIES:  Restricted ROM due to pain in LE. L>R  PSYCH: AAOx3  SKIN: No rashes or lesions    LABS:                        10.9   7.84  )-----------( 148      ( 05 Feb 2018 06:05 )             34.2     02-05    134<L>  |  98  |  10  ----------------------------<  82  4.2   |  23  |  0.64    Ca    8.3<L>      05 Feb 2018 06:05  Phos  2.6     02-05  Mg     1.9     02-05    TPro  9.3<H>  /  Alb  3.0<L>  /  TBili  0.6  /  DBili  x   /  AST  157<H>  /  ALT  100<H>  /  AlkPhos  140<H>  02-05    PT/INR - ( 05 Feb 2018 06:05 )   PT: 12.8 SEC;   INR: 1.15         RADIOLOGY & ADDITIONAL TESTS:    CT Lumbar spine 2/4:  IMPRESSION:      Lytic areas of bony destruction from tumor are again noted involving the   left iliac bone and left sacrum, with associated pathologic fractures,   similar in appearance compared to 01/21/2018 abdomen CT study. Please   note that the area of bony destruction is not fully included in the   field-of-view of this lumbar spine CT study. Please see separate   dedicated pelvic CT study from the same day.  Tumor encases the exiting   sacral nerve roots at these levels.      CT A/P 2/4:  ******PRELIMINARY REPORT******            INTERPRETATION:  Large areas of osteolysis are visualized in the left   sacrum, left ilium and right acetabulum compatible with osseous   metastatic disease given history of metastatic liver cancer. Soft tissue   component of an osteolytic lesion in the left sacrum is visualized   extending into the  visualized w/in the left upper sacral neural foramina.    Consultants d/w: GI, ortho, rad/onc, palliative

## 2018-02-05 NOTE — PROGRESS NOTE ADULT - ATTENDING COMMENTS
Patient seen and examined, d/w HS, agree with above.    46 yo M IVDU, Hepatitis C (untreated) complicated by cirrhosis and ascites, suspected malignancy with pathological fractures transferred from outside hospital for further management. Please see subsequent chart notes for outside hospital results including, path, results suggesting pancreaticobiliary or lung primary (though other sites cannot be excluded). Appreciate rad/onc and ortho input, will discuss if pathology adequate (or if need to obtain additional specimen for definitive diagnosis -> ?IR bx), f/u MRI results, followup treatment plans re: surgical options vs radiation therapy. Plan for med/onc eval. Metastatic cancer pain w/ pathological fractures, appreciate palliative input regarding adjusting pain regimen for better pain control. C/w bowel regimen.

## 2018-02-05 NOTE — PROGRESS NOTE ADULT - PROBLEM SELECTOR PLAN 2
As per outside records, positive IgG. Complicated by liver cirrhosis, esophageal varices, portal HTN. Outside labs show thrombocytopenia,, transaminitis,, and elevated alk phos, synthetic function preserved (INR wnl)  - f/u genotype  - Workup per hepatology pending

## 2018-02-05 NOTE — PROGRESS NOTE ADULT - PROBLEM SELECTOR PLAN 4
s/p detox.   - f/u social work  - Will obtain medication list of what he was receiving in the prior hospital

## 2018-02-05 NOTE — CHART NOTE - NSCHARTNOTEFT_GEN_A_CORE
Results from OSH as per paper chart:    Free kappa, serum: 92 (high; ref 3.3-19.4 mg/L)  Free lambda, serum: 97.3 (high; ref 5.7-26.3 mg/L)  Free kappa/lambda ratio: 0.95 (ref 0.26-1.65)      Protein electrophoresis, serum: Faint band visible with overall polyclonal pattern in gamma region. This may represent an inflammatory or acute phase response, but a developing plasma cell disorder cannot be excluded. Serum and urine immunofixation may be useful if clinically indicated.   Total protein: 7.9 (ref 6.1-8.1 g/dL)    1/30:  albumin - 2.9  alpha 1 globulin, serum - 0.4  beta-globulin - 0.5  beta-2 globulin - 0.4  gamma globulin - 2.9  beta 2 microglobulin, serum - 4.41    Immunofixation (IgG, A, M), serum: Detected  IgG, serum - 2,970  IgA, serum - 404  IgM, serum - 234    Pathology report 1/25:  FINAL DIAGNOSIS:  A. Pelvic mass  Minute focus of malignant tumor and multiple fragments of blood clots and necrotic tumor.    Immunohistological studies on A for CK7, CAM 5.2, PODXL-2 positive, for CK 19.9, TTF-1 focally positive, and for hepatocyte, AFP, CK20, CDX-2, rare cells positive. The findings suggest pancreaticobiliary or lung primary, however other sites cannot be ruled out.        [place] [phone] if there are any questions.  Alexander Grover, Medicine Team 1, R1

## 2018-02-05 NOTE — PROGRESS NOTE ADULT - PROBLEM SELECTOR PLAN 1
left ilium, right acetabulum, and left sacrum in setting of newly diagnosed malignancy. CT findings c/w metastatic dz - primary most likely HCC  - Pain control with oxycodone 10 q8hrs (per pt, need med rec from OSH)  - Bowel regimen  -f/u MR femur and MRI pelvis as per ortho recs  -appreciate ortho recs left ilium, right acetabulum, and left sacrum in setting of newly diagnosed malignancy. CT findings c/w metastatic dz - primary most likely HCC  - Pain control with opioids, titrate regimen as needed  - Bowel regimen, currently denies constipation  - f/u MR femur and MRI pelvis as per ortho recs  - appreciate ortho recs, steroid (decadron) as per ortho  - appreciate rad/onc input - potential candidate for radiation therapy, to be coordinated

## 2018-02-05 NOTE — PROGRESS NOTE ADULT - PROBLEM SELECTOR PLAN 3
stage IV. Liver is likely the primary source as per imaging. stage IV. Liver is likely the primary source as per imaging.  - further therapy to be determined

## 2018-02-05 NOTE — PROGRESS NOTE ADULT - ASSESSMENT
48 y/o M with hx of HCV (Tx Naive), CLBP, Hx of IVDU, Liver Lesion s/p bx (at [facility]) was transferred for treatment of pathologic fractures of the ilium, acetabulum and sacrum.    Impression:  1) Cirrhosis - suspect due to HCV. MELD-Na Score of 8. OSH CT imaging suggestive of portal hypertension.  2) HCV - treatment naive  3) Liver lesion - s/p biopsy at OSH, results unknown. Suspect HCC with metastases (but AFP low)  4) Pathologic Fractures    Plan:  - F/u MRI/MCRP report  - F/u HCV genotype  - Check hepatitis B serologies (surface antigen, surface Ab, core Ab/IgM)  - Obtain liver bx result  - Check CA-19-9 level  - Once all information is gathered, will determine treatment options

## 2018-02-05 NOTE — CHART NOTE - NSCHARTNOTEFT_GEN_A_CORE
Called [facility] and obtained liver bx pathology result.    Path: minute focus of malignant tumor and multiple fragments of blood clots and necrotic tumor; immunohistochemical studies for CK7, CAM 5.2, PODXL-2 positive; for CK19.9, TTF-1 focally positive; and for hepatocyte AFP, CK20, CDX-2 rare cells positive. The findings suggest pancreaticobiliary or lung primary (however other sites cannot be excluded).

## 2018-02-05 NOTE — PROGRESS NOTE ADULT - ATTENDING COMMENTS
Patient was seen and examined with GI fellow at rounds. Agree with above.   Patient with history of chronic hepatitis C, treatment naive with liver mass and bone osteolytic lesion, s/p liver biopsy at outside hospital.  Await MRI of abdomen. and review outside medical records when available.

## 2018-02-05 NOTE — PROGRESS NOTE ADULT - PROBLEM SELECTOR PLAN 5
-RCRI score of 0 with no cardiac risk factors, EKG NSR. Patient prior to fractures, had no SOB/BREEN, CP/angina, LOC when climbing a flight of stairs.   - no cardiac limitations to exercise or functional capacity   - continue perioperative propranolol.  -Pt. is medically optimized for OR with no further work-up indicated

## 2018-02-05 NOTE — PROGRESS NOTE ADULT - SUBJECTIVE AND OBJECTIVE BOX
SUBJECTIVE:  - Pt reports severe bilateral LE pain  - No fever or chills  - No abdominal pain    OBJECTIVE:    Vital Signs Last 24 Hrs  T(C): 36.7 (05 Feb 2018 05:58), Max: 37 (04 Feb 2018 14:41)  T(F): 98.1 (05 Feb 2018 05:58), Max: 98.6 (04 Feb 2018 14:41)  HR: 76 (05 Feb 2018 05:58) (76 - 83)  BP: 114/81 (05 Feb 2018 05:58) (100/63 - 127/82)  BP(mean): --  RR: 18 (05 Feb 2018 05:58) (18 - 18)  SpO2: 99% (05 Feb 2018 05:58) (97% - 100%)    PHYSICAL EXAM:  Constitutional: no acute distress  Eyes: no icterus  Neck: no masses, no LAD  Respiratory: normal inspiratory effort; no wheezing or crackles  Cardiovascular: RRR, normal S1/S2, no murmurs/rubs/gallops  Gastrointestinal: soft, nondistended, nontender, +BS  Extremities: no LE edema  Neurological: AAOx3, no asterixis  Skin: no rashes, bruises, petechiae    LABS:                        10.9   7.84  )-----------( 148      ( 05 Feb 2018 06:05 )             34.2     134<L>  |  98  |  10  ----------------------------<  82  4.2   |  23  |  0.64    Ca    8.3<L>      05 Feb 2018 06:05  Phos  2.6     02-05  Mg     1.9     02-05    TPro  9.3<H>  /  Alb  3.0<L>  /  TBili  0.6  /  DBili  x   /  AST  157<H>  /  ALT  100<H>  /  AlkPhos  140<H>  02-05  PT/INR - ( 05 Feb 2018 06:05 )   PT: 12.8 SEC;   INR: 1.15     LIVER FUNCTIONS - ( 05 Feb 2018 06:05 )  Alb: 3.0 g/dL / Pro: 9.3 g/dL / ALK PHOS: 140 u/L / ALT: 100 u/L / AST: 157 u/L / GGT: x

## 2018-02-06 DIAGNOSIS — B19.20 UNSPECIFIED VIRAL HEPATITIS C WITHOUT HEPATIC COMA: ICD-10-CM

## 2018-02-06 DIAGNOSIS — F19.90 OTHER PSYCHOACTIVE SUBSTANCE USE, UNSPECIFIED, UNCOMPLICATED: ICD-10-CM

## 2018-02-06 DIAGNOSIS — Z51.5 ENCOUNTER FOR PALLIATIVE CARE: ICD-10-CM

## 2018-02-06 DIAGNOSIS — G47.00 INSOMNIA, UNSPECIFIED: ICD-10-CM

## 2018-02-06 DIAGNOSIS — G89.3 NEOPLASM RELATED PAIN (ACUTE) (CHRONIC): ICD-10-CM

## 2018-02-06 DIAGNOSIS — R53.81 OTHER MALAISE: ICD-10-CM

## 2018-02-06 DIAGNOSIS — K74.60 UNSPECIFIED CIRRHOSIS OF LIVER: ICD-10-CM

## 2018-02-06 LAB
ALBUMIN SERPL ELPH-MCNC: 3.2 G/DL — LOW (ref 3.3–5)
ALP SERPL-CCNC: 148 U/L — HIGH (ref 40–120)
ALT FLD-CCNC: 95 U/L — HIGH (ref 4–41)
AST SERPL-CCNC: 114 U/L — HIGH (ref 4–40)
BASOPHILS # BLD AUTO: 0.01 K/UL — SIGNIFICANT CHANGE UP (ref 0–0.2)
BASOPHILS NFR BLD AUTO: 0.1 % — SIGNIFICANT CHANGE UP (ref 0–2)
BILIRUB SERPL-MCNC: 0.5 MG/DL — SIGNIFICANT CHANGE UP (ref 0.2–1.2)
BUN SERPL-MCNC: 15 MG/DL — SIGNIFICANT CHANGE UP (ref 7–23)
CALCIUM SERPL-MCNC: 8.4 MG/DL — SIGNIFICANT CHANGE UP (ref 8.4–10.5)
CEA SERPL-MCNC: 16.7 NG/ML — HIGH (ref 1–3.8)
CHLORIDE SERPL-SCNC: 99 MMOL/L — SIGNIFICANT CHANGE UP (ref 98–107)
CO2 SERPL-SCNC: 22 MMOL/L — SIGNIFICANT CHANGE UP (ref 22–31)
CREAT SERPL-MCNC: 0.67 MG/DL — SIGNIFICANT CHANGE UP (ref 0.5–1.3)
EOSINOPHIL # BLD AUTO: 0 K/UL — SIGNIFICANT CHANGE UP (ref 0–0.5)
EOSINOPHIL NFR BLD AUTO: 0 % — SIGNIFICANT CHANGE UP (ref 0–6)
FERRITIN SERPL-MCNC: 701.4 NG/ML — HIGH (ref 30–400)
FOLATE SERPL-MCNC: > 20 NG/ML — HIGH (ref 4.7–20)
GLUCOSE SERPL-MCNC: 130 MG/DL — HIGH (ref 70–99)
HAPTOGLOB SERPL-MCNC: 202 MG/DL — HIGH (ref 34–200)
HBV CORE AB SER-ACNC: NONREACTIVE — SIGNIFICANT CHANGE UP
HBV CORE IGM SER-ACNC: NONREACTIVE — SIGNIFICANT CHANGE UP
HBV SURFACE AB SER-ACNC: 45.7 MLU/ML — SIGNIFICANT CHANGE UP
HBV SURFACE AB SER-ACNC: REACTIVE — SIGNIFICANT CHANGE UP
HBV SURFACE AG SER-ACNC: NEGATIVE — SIGNIFICANT CHANGE UP
HCT VFR BLD CALC: 34.2 % — LOW (ref 39–50)
HCV GENTYP BLD NAA+PROBE: SIGNIFICANT CHANGE UP
HCV RNA SPEC QL PROBE+SIG AMP: SIGNIFICANT CHANGE UP
HGB BLD-MCNC: 11.5 G/DL — LOW (ref 13–17)
IMM GRANULOCYTES # BLD AUTO: 0.07 # — SIGNIFICANT CHANGE UP
IMM GRANULOCYTES NFR BLD AUTO: 0.7 % — SIGNIFICANT CHANGE UP (ref 0–1.5)
IRON SATN MFR SERPL: 243 UG/DL — SIGNIFICANT CHANGE UP (ref 155–535)
IRON SATN MFR SERPL: 54 UG/DL — SIGNIFICANT CHANGE UP (ref 45–165)
LDH SERPL L TO P-CCNC: 297 U/L — HIGH (ref 135–225)
LYMPHOCYTES # BLD AUTO: 0.84 K/UL — LOW (ref 1–3.3)
LYMPHOCYTES # BLD AUTO: 8.5 % — LOW (ref 13–44)
MAGNESIUM SERPL-MCNC: 2 MG/DL — SIGNIFICANT CHANGE UP (ref 1.6–2.6)
MCHC RBC-ENTMCNC: 28.8 PG — SIGNIFICANT CHANGE UP (ref 27–34)
MCHC RBC-ENTMCNC: 33.6 % — SIGNIFICANT CHANGE UP (ref 32–36)
MCV RBC AUTO: 85.7 FL — SIGNIFICANT CHANGE UP (ref 80–100)
MONOCYTES # BLD AUTO: 0.24 K/UL — SIGNIFICANT CHANGE UP (ref 0–0.9)
MONOCYTES NFR BLD AUTO: 2.4 % — SIGNIFICANT CHANGE UP (ref 2–14)
NEUTROPHILS # BLD AUTO: 8.73 K/UL — HIGH (ref 1.8–7.4)
NEUTROPHILS NFR BLD AUTO: 88.3 % — HIGH (ref 43–77)
NRBC # FLD: 0 — SIGNIFICANT CHANGE UP
PHOSPHATE SERPL-MCNC: 3.8 MG/DL — SIGNIFICANT CHANGE UP (ref 2.5–4.5)
PLATELET # BLD AUTO: 155 K/UL — SIGNIFICANT CHANGE UP (ref 150–400)
PMV BLD: 11.3 FL — SIGNIFICANT CHANGE UP (ref 7–13)
POTASSIUM SERPL-MCNC: 4.4 MMOL/L — SIGNIFICANT CHANGE UP (ref 3.5–5.3)
POTASSIUM SERPL-SCNC: 4.4 MMOL/L — SIGNIFICANT CHANGE UP (ref 3.5–5.3)
PROT SERPL-MCNC: 9.8 G/DL — HIGH (ref 6–8.3)
PSA FLD-MCNC: 0.03 NG/ML — SIGNIFICANT CHANGE UP (ref 0–4)
RBC # BLD: 3.99 M/UL — LOW (ref 4.2–5.8)
RBC # FLD: 15 % — HIGH (ref 10.3–14.5)
RETICS #: 124 K/UL — SIGNIFICANT CHANGE UP (ref 25–125)
RETICS/RBC NFR: 3.3 % — HIGH (ref 0.5–2.5)
SODIUM SERPL-SCNC: 135 MMOL/L — SIGNIFICANT CHANGE UP (ref 135–145)
UIBC SERPL-MCNC: 189 UG/DL — SIGNIFICANT CHANGE UP (ref 110–370)
URATE SERPL-MCNC: 4.6 MG/DL — SIGNIFICANT CHANGE UP (ref 3.4–8.8)
VIT B12 SERPL-MCNC: 992 PG/ML — HIGH (ref 200–900)
WBC # BLD: 9.89 K/UL — SIGNIFICANT CHANGE UP (ref 3.8–10.5)
WBC # FLD AUTO: 9.89 K/UL — SIGNIFICANT CHANGE UP (ref 3.8–10.5)

## 2018-02-06 PROCEDURE — 99222 1ST HOSP IP/OBS MODERATE 55: CPT | Mod: GC

## 2018-02-06 PROCEDURE — 99233 SBSQ HOSP IP/OBS HIGH 50: CPT | Mod: GC

## 2018-02-06 PROCEDURE — 99223 1ST HOSP IP/OBS HIGH 75: CPT

## 2018-02-06 PROCEDURE — 78306 BONE IMAGING WHOLE BODY: CPT | Mod: 26

## 2018-02-06 PROCEDURE — 99232 SBSQ HOSP IP/OBS MODERATE 35: CPT | Mod: GC

## 2018-02-06 PROCEDURE — 93010 ELECTROCARDIOGRAM REPORT: CPT

## 2018-02-06 RX ORDER — METHADONE HYDROCHLORIDE 40 MG/1
5 TABLET ORAL
Qty: 0 | Refills: 0 | Status: DISCONTINUED | OUTPATIENT
Start: 2018-02-06 | End: 2018-02-13

## 2018-02-06 RX ORDER — HYDROMORPHONE HYDROCHLORIDE 2 MG/ML
3 INJECTION INTRAMUSCULAR; INTRAVENOUS; SUBCUTANEOUS ONCE
Qty: 0 | Refills: 0 | Status: DISCONTINUED | OUTPATIENT
Start: 2018-02-06 | End: 2018-02-06

## 2018-02-06 RX ORDER — DEXAMETHASONE 0.5 MG/5ML
6 ELIXIR ORAL ONCE
Qty: 0 | Refills: 0 | Status: COMPLETED | OUTPATIENT
Start: 2018-02-06 | End: 2018-02-06

## 2018-02-06 RX ORDER — METHADONE HYDROCHLORIDE 40 MG/1
2.5 TABLET ORAL
Qty: 0 | Refills: 0 | Status: DISCONTINUED | OUTPATIENT
Start: 2018-02-06 | End: 2018-02-13

## 2018-02-06 RX ADMIN — METHADONE HYDROCHLORIDE 5 MILLIGRAM(S): 40 TABLET ORAL at 13:49

## 2018-02-06 RX ADMIN — OXYCODONE HYDROCHLORIDE 20 MILLIGRAM(S): 5 TABLET ORAL at 06:00

## 2018-02-06 RX ADMIN — Medication 10 MILLIGRAM(S): at 05:58

## 2018-02-06 RX ADMIN — HYDROMORPHONE HYDROCHLORIDE 3 MILLIGRAM(S): 2 INJECTION INTRAMUSCULAR; INTRAVENOUS; SUBCUTANEOUS at 16:05

## 2018-02-06 RX ADMIN — Medication 6 MILLIGRAM(S): at 15:09

## 2018-02-06 RX ADMIN — HYDROMORPHONE HYDROCHLORIDE 3 MILLIGRAM(S): 2 INJECTION INTRAMUSCULAR; INTRAVENOUS; SUBCUTANEOUS at 00:51

## 2018-02-06 RX ADMIN — OXYCODONE HYDROCHLORIDE 20 MILLIGRAM(S): 5 TABLET ORAL at 07:00

## 2018-02-06 RX ADMIN — HYDROMORPHONE HYDROCHLORIDE 3 MILLIGRAM(S): 2 INJECTION INTRAMUSCULAR; INTRAVENOUS; SUBCUTANEOUS at 13:59

## 2018-02-06 RX ADMIN — HYDROMORPHONE HYDROCHLORIDE 3 MILLIGRAM(S): 2 INJECTION INTRAMUSCULAR; INTRAVENOUS; SUBCUTANEOUS at 00:36

## 2018-02-06 RX ADMIN — METHADONE HYDROCHLORIDE 5 MILLIGRAM(S): 40 TABLET ORAL at 21:57

## 2018-02-06 RX ADMIN — HYDROMORPHONE HYDROCHLORIDE 3 MILLIGRAM(S): 2 INJECTION INTRAMUSCULAR; INTRAVENOUS; SUBCUTANEOUS at 21:57

## 2018-02-06 RX ADMIN — HYDROMORPHONE HYDROCHLORIDE 3 MILLIGRAM(S): 2 INJECTION INTRAMUSCULAR; INTRAVENOUS; SUBCUTANEOUS at 04:50

## 2018-02-06 RX ADMIN — Medication 10 MILLIGRAM(S): at 17:19

## 2018-02-06 RX ADMIN — HYDROMORPHONE HYDROCHLORIDE 3 MILLIGRAM(S): 2 INJECTION INTRAMUSCULAR; INTRAVENOUS; SUBCUTANEOUS at 11:42

## 2018-02-06 RX ADMIN — HYDROMORPHONE HYDROCHLORIDE 3 MILLIGRAM(S): 2 INJECTION INTRAMUSCULAR; INTRAVENOUS; SUBCUTANEOUS at 15:58

## 2018-02-06 RX ADMIN — Medication 1 TABLET(S): at 11:50

## 2018-02-06 RX ADMIN — HYDROMORPHONE HYDROCHLORIDE 3 MILLIGRAM(S): 2 INJECTION INTRAMUSCULAR; INTRAVENOUS; SUBCUTANEOUS at 22:12

## 2018-02-06 RX ADMIN — HYDROMORPHONE HYDROCHLORIDE 3 MILLIGRAM(S): 2 INJECTION INTRAMUSCULAR; INTRAVENOUS; SUBCUTANEOUS at 11:08

## 2018-02-06 RX ADMIN — HYDROMORPHONE HYDROCHLORIDE 3 MILLIGRAM(S): 2 INJECTION INTRAMUSCULAR; INTRAVENOUS; SUBCUTANEOUS at 14:30

## 2018-02-06 RX ADMIN — HYDROMORPHONE HYDROCHLORIDE 3 MILLIGRAM(S): 2 INJECTION INTRAMUSCULAR; INTRAVENOUS; SUBCUTANEOUS at 05:05

## 2018-02-06 RX ADMIN — Medication 75 MILLIGRAM(S): at 21:57

## 2018-02-06 NOTE — CONSULT NOTE ADULT - PROBLEM SELECTOR RECOMMENDATION 9
Literature left in the chart for housestaff: meta analysis on opiates and liver disease  Suboptimal control of pain on oxycodone  Recent uptitration yesterday to oxy 20mg q8H  Given his IVDU history, he may benefit from methadone as a neoplasm pain agent   Additionally, this confers some neuropathic pain benefit  oxy 20 mg q8H ATC= oxy 60 mg TDD = po morphine 90mg   po morphine 90mg = po methadone 22.5mg  po methadone 22.5mg decrease by 50% for incomplete cross tolerance  po methadone 11.5 mg---> consider increase to 12.5 to aid dosing  Begin methadone 2.5 mg/5mg/5mg  Continue IV dilaudid 3mg q3H prn  Monitor for sedation, bradypnea, confusion, or lethargy  We may consider PCA as a prn only in the event of evolving symptoms  K>4, Mg >2  Naloxone prn  In the event of unsuccessful naloxone IV, consider naloxone infusion given the half life of methadone Literature left in the chart for housestaff: meta analysis on opiates and liver disease  Suboptimal control of pain on oxycodone  Recent uptitration yesterday to oxy 20mg q8H  Given his IVDU history, he may benefit from methadone as a neoplasm pain agent   Additionally, this confers some neuropathic pain benefit  Anxiety due to medical condition and misapplied blame of himself for this potential malignancy, compounded by insomnia  Will not use total amount or prn opiates out of concern for "total pain"  oxy 20 mg q8H ATC= oxy 60 mg TDD = po morphine 90mg   po morphine 90mg = po methadone 22.5mg  po methadone 22.5mg decrease by 50% for incomplete cross tolerance  po methadone 11.5 mg---> consider increase to 12.5 to aid dosing  Begin methadone 2.5 mg/5mg/5mg  Continue IV dilaudid 3mg q3H prn  Monitor for sedation, bradypnea, confusion, or lethargy  We may consider PCA as a prn only in the event of evolving symptoms  K>4, Mg >2  Naloxone prn  In the event of unsuccessful naloxone IV, consider naloxone infusion given the half life of methadone

## 2018-02-06 NOTE — CONSULT NOTE ADULT - PROBLEM SELECTOR RECOMMENDATION 8
[employer] [facility]  Emotional support  Pal SW follow up  He is very alone, with few friend and no contact with his adult children  Pt is homeless, he resides in a shelter

## 2018-02-06 NOTE — CONSULT NOTE ADULT - ASSESSMENT
47 y.o M with hx of IVDU and HCV txf from OSH with diffuse mesfin lesions awaiting path    #mesfin lesions  - unclear primary, however imaging concerning for underlying malignancy , will need to arrange for tissue biopsy   - bone scan  - consider biopsy of mesfin lesion or mass surrounding left sacrum   -Ct chest abd pelvis with contrast to eval extent of disease  - CEA, PSA , testosterone   - Bhcg, can consider testicular sono, although unlikely primary.   - obtain all records from out side hospital     #anemia  - iron studies  - occult blood    #hcv  - hepatology input 47 y.o M with hx of IVDU and HCV txf from OSH with diffuse mesfin lesions awaiting biopsy     #mesfin lesions  - unclear primary, however imaging concerning for underlying malignancy , will need to arrange for tissue biopsy   - bone scan  - consider biopsy of mesfin lesion or mass surrounding left sacrum   -Ct chest abd pelvis with contrast to eval extent of disease  - CEA, PSA , testosterone   - Bhcg, can consider testicular sono, although unlikely primary.   - obtain all records from out side hospital     #anemia  - iron studies  - occult blood    #hcv  - hepatology cs

## 2018-02-06 NOTE — PROGRESS NOTE ADULT - PROBLEM SELECTOR PLAN 4
s/p detox.   - f/u social work  - Will obtain medication list of what he was receiving in the prior hospital s/p detox.   - f/u social work  - methadone as per palliative care

## 2018-02-06 NOTE — CONSULT NOTE ADULT - PROBLEM SELECTOR RECOMMENDATION 4
He reports no diagnosis of cirrhosis in the past or hemoptysis/melena  Reports of outside hospital imaging documenting cirrhosis  No signs of acutely decompensated cirrhosis  Judicious dosing of opiates

## 2018-02-06 NOTE — CONSULT NOTE ADULT - PROBLEM SELECTOR RECOMMENDATION 2
He disclosed his hx of IVDU, and recent sobriety (a little over 1 month)  He is fearful of stigmatization and feels burdened by the belief that his actions  directly/indirectly have lead to worsened physical state (i.e. potential cancer) He disclosed his hx of IVDU, and recent sobriety (a little over 1 month)  He is fearful of stigmatization and feels burdened by the belief that his actions  directly/indirectly have lead to worsened physical state (i.e. potential cancer)    Consider any specialized SW services to aid patients with historical SW issues

## 2018-02-06 NOTE — PROGRESS NOTE ADULT - ASSESSMENT
46 yo Male with hx of drug abuse, hepatitis C, chronic back pain, possible new diagnosis of hepatocellular carcinoma transferred from outside hospital for pathologic fractures of left ilium, right acetabulum, and left sacrum. 48 yo Male with hx of drug abuse, hepatitis C, chronic back pain, transferred from outside hospital for pathologic fractures of left ilium, right acetabulum, and left sacrum, undergoing further malignancy workup.

## 2018-02-06 NOTE — PROGRESS NOTE ADULT - SUBJECTIVE AND OBJECTIVE BOX
Patient is a 47y old  Male who presents with a chief complaint of hip pain (03 Feb 2018 12:32)      SUBJECTIVE / OVERNIGHT EVENTS:  No acute events overnight. Patient's pain well-controlled on adjusted pain regimen.     MEDICATIONS  (STANDING):  calcium carbonate 1250 mG + Vitamin D (OsCal 500 + D) 1 Tablet(s) Oral daily  oxyCODONE    IR 20 milliGRAM(s) Oral every 8 hours  pregabalin 75 milliGRAM(s) Oral at bedtime  propranolol 10 milliGRAM(s) Oral every 12 hours    MEDICATIONS  (PRN):  HYDROmorphone  Injectable 3 milliGRAM(s) IV Push every 4 hours PRN Severe Pain (7 - 10)  naloxone Injectable 0.1 milliGRAM(s) IV Push every 3 minutes PRN Unarouseable or RR <11  polyethylene glycol 3350 17 Gram(s) Oral daily PRN Constipation        CAPILLARY BLOOD GLUCOSE        I&O's Summary      PHYSICAL EXAM:  VS: T 98.1, HR 88, /74, RR 18, SpO2 98% on RA  GENERAL: NAD, well-developed  HEAD:  Atraumatic, Normocephalic  EYES: EOMI, conjunctiva and sclera clear  NECK: Supple, No JVD  CHEST/LUNG: Clear to auscultation bilaterally; No wheeze  HEART: Regular rate and rhythm; No murmurs, rubs, or gallops  ABDOMEN: Soft, Nontender, Nondistended  EXTREMITIES: Limited ROM due to pain in LE. L>R, No pain at rest.  PSYCH: AAOx3  SKIN: No rashes or lesions    LABS:                        11.5   9.89  )-----------( 155      ( 06 Feb 2018 06:00 )             34.2     02-06    135  |  99  |  15  ----------------------------<  130<H>  4.4   |  22  |  0.67    Ca    8.4      06 Feb 2018 06:00  Phos  3.8     02-06  Mg     2.0     02-06    TPro  9.8<H>  /  Alb  3.2<L>  /  TBili  0.5  /  DBili  x   /  AST  114<H>  /  ALT  95<H>  /  AlkPhos  148<H>  02-06    PT/INR - ( 05 Feb 2018 06:05 )   PT: 12.8 SEC;   INR: 1.15                    RADIOLOGY & ADDITIONAL TESTS:  No new additional studies. Patient is a 47y old  Male who presents with a chief complaint of hip pain (03 Feb 2018 12:32)    SUBJECTIVE / OVERNIGHT EVENTS:  No acute events overnight. Patient's pain well-controlled on adjusted pain regimen. No issues with BM. Hopeful to get final diagnosis soon and then discuss potential treatment options soon.     MEDICATIONS  (STANDING):  calcium carbonate 1250 mG + Vitamin D (OsCal 500 + D) 1 Tablet(s) Oral daily  oxyCODONE    IR 20 milliGRAM(s) Oral every 8 hours  pregabalin 75 milliGRAM(s) Oral at bedtime  propranolol 10 milliGRAM(s) Oral every 12 hours    MEDICATIONS  (PRN):  HYDROmorphone  Injectable 3 milliGRAM(s) IV Push every 4 hours PRN Severe Pain (7 - 10)  naloxone Injectable 0.1 milliGRAM(s) IV Push every 3 minutes PRN Unarouseable or RR <11  polyethylene glycol 3350 17 Gram(s) Oral daily PRN Constipation    CAPILLARY BLOOD GLUCOSE    I&O's Summary      PHYSICAL EXAM:  VS: T 98.1, HR 88, /74, RR 18, SpO2 98% on RA  GENERAL: NAD, well-developed  HEAD:  Atraumatic, Normocephalic  EYES: EOMI, conjunctiva and sclera clear  NECK: Supple, No JVD  CHEST/LUNG: Clear to auscultation bilaterally; No wheeze  HEART: Regular rate and rhythm; No murmurs, rubs, or gallops  ABDOMEN: Soft, Nontender, Nondistended  EXTREMITIES: Limited ROM due to pain in LE. L>R, No pain at rest.  PSYCH: AAOx3  SKIN: No rashes or lesions    LABS:                        11.5   9.89  )-----------( 155      ( 06 Feb 2018 06:00 )             34.2     02-06    135  |  99  |  15  ----------------------------<  130<H>  4.4   |  22  |  0.67    Ca    8.4      06 Feb 2018 06:00  Phos  3.8     02-06  Mg     2.0     02-06    TPro  9.8<H>  /  Alb  3.2<L>  /  TBili  0.5  /  DBili  x   /  AST  114<H>  /  ALT  95<H>  /  AlkPhos  148<H>  02-06    PT/INR - ( 05 Feb 2018 06:05 )   PT: 12.8 SEC;   INR: 1.15         RADIOLOGY & ADDITIONAL TESTS:  No new additional studies.     Will attempt to upload outside imaging to radiology/PACS.   NM and MRI pending.     Consultants d/w: Onc, IR, GI

## 2018-02-06 NOTE — PROGRESS NOTE ADULT - SUBJECTIVE AND OBJECTIVE BOX
SUBJECTIVE:  - Pt reports moderate to severe LE pain  - He denies abdominal pain  - No fever or chills    OBJECTIVE:    Vital Signs Last 24 Hrs  T(C): 36.7 (06 Feb 2018 06:16), Max: 37.2 (05 Feb 2018 21:37)  T(F): 98.1 (06 Feb 2018 06:16), Max: 99 (05 Feb 2018 21:37)  HR: 72 (06 Feb 2018 11:06) (72 - 97)  BP: 106/68 (06 Feb 2018 11:06) (106/68 - 122/83)  BP(mean): --  RR: 16 (06 Feb 2018 11:06) (16 - 18)  SpO2: 100% (06 Feb 2018 11:06) (96% - 100%)    PHYSICAL EXAM:  Constitutional: no acute distress  Eyes: no icterus  Neck: no masses, no LAD  Respiratory: normal inspiratory effort; no wheezing or crackles  Cardiovascular: RRR, normal S1/S2, no murmurs/rubs/gallops  Gastrointestinal: soft, nondistended, nontender, +BS  Extremities: no LE edema  Neurological: AAOx3, no asterixis  Skin: no rashes, bruises, petechiae    LABS:                        11.5   9.89  )-----------( 155      ( 06 Feb 2018 06:00 )             34.2     135  |  99  |  15  ----------------------------<  130<H>  4.4   |  22  |  0.67    Ca    8.4      06 Feb 2018 06:00  Phos  3.8     02-06  Mg     2.0     02-06    TPro  9.8<H>  /  Alb  3.2<L>  /  TBili  0.5  /  DBili  x   /  AST  114<H>  /  ALT  95<H>  /  AlkPhos  148<H>  02-06  PT/INR - ( 05 Feb 2018 06:05 )   PT: 12.8 SEC;   INR: 1.15     LIVER FUNCTIONS - ( 06 Feb 2018 06:00 )  Alb: 3.2 g/dL / Pro: 9.8 g/dL / ALK PHOS: 148 u/L / ALT: 95 u/L / AST: 114 u/L / GGT: x

## 2018-02-06 NOTE — CONSULT NOTE ADULT - SUBJECTIVE AND OBJECTIVE BOX
HPI:  48 yo Male with hx of drug abuse, hepatitis C, chronic back pain, possible new diagnosis of hepatocellular carcinoma transferred from outside hospital for pathologic fracture. Patient initially presented to the hospital two weeks ago for worsening lower back and left hip pain. Pain limited ambulation. Imaging revealed pathologic fractures of the left sacrum, left ilium, and right acetabulum. Patient was accepted by Dr. Back (orthopedic surgery). Patient reports he has been using IV heroin for about twenty years. Recently went through detox, s/p methadone (no longer taking). He was diagnosed with hepatitic C, but never received treatment. He completed a detox program prior to arriving to the hospital two weeks ago. Does not have family; lives in a shelter.     Denies fevers, chills, nausea, vomiting, hemoptysis, chest pain, SOB, abdominal pain, abdominal swelling, lower extremity swelling, rashes. Does have intermittent nose bleeds. (03 Feb 2018 04:49)      Pain is severe  across the area superior to the groin with LLE extension to the knee  The hip pelvic pain is crushing  the knee pain an ache and tingling  pain max 10/10  pain min post prn 1/10  time last > 1hr-2hr  pain escalation is gradual but he takes the prn at the plateau of maximal pain  Comfortable roughly 5 hours  PHQ2 negative  Insomnia, issues with sleep onset, thinking about his disease  He randall through reflection  Recently celebrated one month of sobriety                  PERTINENT PMH REVIEWED:  [ ] YES [ ] NO           SOCIAL HISTORY:  Significant other/partner:  [ ] YES  [ ] NO            Children:  [ ] YES  [ ] NO                   Orthodox/Spirituality:  Substance hx:  [ ] YES   [ ] NO           Tobacco hx:  [ ] YES  [ ] NO             Alcohol hx: [ ] YES  [ ] NO        Home Opioid hx:  [ ] YES  [ ] NO   Living Situation: [ ] Home  [ ] Long term care  [ ] Rehab    REFERRALS:   [ ] Chaplaincy  [ ] Hospice  [ ] Child Life  [ ] Social Work  [ ] Case management [ ] Holistic Therapy     FAMILY HISTORY:  No pertinent family history in first degree relatives    [ ] Family history non contributory     BASELINE ADLs (prior to admission):  Independent [ ] moderately [ ] fully   Dependent   [ ] moderately [ ] fully    ADVANCE DIRECTIVES:  [ ] YES [ ] NO   DNR [ ] YES [ ] NO                      MOLST  [ ] YES [ ] NO    Living Will  [ ] YES [ ] NO    Health Care Proxy [ ] YES  [ ] NO      [ ] Surrogate  [ ] HCP  [ ] Guardian:                                                                  Phone#:    Allergies    No Known Allergies    Intolerances        MEDICATIONS  (STANDING):  calcium carbonate 1250 mG + Vitamin D (OsCal 500 + D) 1 Tablet(s) Oral daily  dexamethasone     Tablet 6 milliGRAM(s) Oral once  methadone    Tablet 5 milliGRAM(s) Oral <User Schedule>  methadone    Tablet 2.5 milliGRAM(s) Oral <User Schedule>  pregabalin 75 milliGRAM(s) Oral at bedtime  propranolol 10 milliGRAM(s) Oral every 12 hours    MEDICATIONS  (PRN):  HYDROmorphone  Injectable 3 milliGRAM(s) IV Push every 4 hours PRN Severe Pain (7 - 10)  naloxone Injectable 0.1 milliGRAM(s) IV Push every 3 minutes PRN Unarouseable or RR <11  polyethylene glycol 3350 17 Gram(s) Oral daily PRN Constipation      PRESENT SYMPTOMS:  Source: [ ] Patient   [ ] Family   [ ] Team     Pain: [ ] YES [ ] NO  OLDCARTS:     Dyspnea: [ ] YES [ ] NO   Anxiety: [ ] YES [ ] NO  Fatigue: [ ] YES [ ] NO   Nausea: [ ] YES [ ] NO  Loss of appetite: [ ] YES [ ] NO   Constipation: [ ] YES [ ] NO     Other Symptoms:  [ ] All other review of systems negative   [ ] Unable to obtain due to poor mentation     Karnofsky Performance Score/Palliative Performance Status Version 2:         %  Protein Calorie Malutrition:  [ ] Mild   [ ] Moderate   [ ] Severe     Vital Signs Last 24 Hrs  T(C): 36.7 (06 Feb 2018 06:16), Max: 37.2 (05 Feb 2018 21:37)  T(F): 98.1 (06 Feb 2018 06:16), Max: 99 (05 Feb 2018 21:37)  HR: 74 (06 Feb 2018 11:41) (72 - 97)  BP: 107/70 (06 Feb 2018 11:41) (106/68 - 122/83)  BP(mean): --  RR: 18 (06 Feb 2018 11:41) (16 - 18)  SpO2: 100% (06 Feb 2018 11:41) (96% - 100%)    Physical Exam:    General: [ ] Alert,  A&O x     [ ] lethargic   [ ] Agitated   [ ] Cachexia   HEENT: [ ] Normal   [ ] Dry mouth   [ ] ET Tube    [ ] Trach   Lungs: [ ] Clear [ ] Rhonchi  [ ] Crackles [ ] Wheezing [ ] Tachypnea  [ ] Audible excessive secretions   Cardiovascular:  [ ] Regular rate and rhythm  [ ] Irregular [ ] Tachycardia   [ ] Bradycardia   Abdomen: [ ] Soft  [ ] Distended  [ ]  [ ] +BS  [ ] Non tender [ ] Tender  [ ]PEG   [ ] NGT   Last BM:     Genitourinary: [ ] Normal [ ] Incontinent   [ ] Oliguria/Anuria   [ ] Knox  Musculoskeletal:  [ ] Normal   [ ] Generalized weakness  [ ] Bedbound   Neurological: [ ] No focal deficits  [ ] Cognitive impairment     Skin: [ ] Normal   [ ] Pressure ulcers     LABS:                        11.5   9.89  )-----------( 155      ( 06 Feb 2018 06:00 )             34.2     02-06    135  |  99  |  15  ----------------------------<  130<H>  4.4   |  22  |  0.67    Ca    8.4      06 Feb 2018 06:00  Phos  3.8     02-06  Mg     2.0     02-06    TPro  9.8<H>  /  Alb  3.2<L>  /  TBili  0.5  /  DBili  x   /  AST  114<H>  /  ALT  95<H>  /  AlkPhos  148<H>  02-06    PT/INR - ( 05 Feb 2018 06:05 )   PT: 12.8 SEC;   INR: 1.15              I&O's Summary      RADIOLOGY & ADDITIONAL STUDIES: HPI:  48 yo Male with hx of drug abuse, hepatitis C, chronic back pain, possible new diagnosis of hepatocellular carcinoma transferred from outside hospital for pathologic fracture. Patient initially presented to the hospital two weeks ago for worsening lower back and left hip pain. Pain limited ambulation. Imaging revealed pathologic fractures of the left sacrum, left ilium, and right acetabulum. Patient was accepted by Dr. Back (orthopedic surgery). Patient reports he has been using IV heroin for about twenty years. Recently went through detox, s/p methadone (no longer taking). He was diagnosed with hepatitic C, but never received treatment. He completed a detox program prior to arriving to the hospital two weeks ago. Does not have family; lives in a shelter.     Denies fevers, chills, nausea, vomiting, hemoptysis, chest pain, SOB, abdominal pain, abdominal swelling, lower extremity swelling, rashes. Does have intermittent nose bleeds. (03 Feb 2018 04:49)      Pain is severe  across the area superior to the groin with LLE extension to the knee  The hip pelvic pain is crushing  the knee pain an ache and tingling  pain max 10/10  pain min post prn 1/10  time last > 1hr-2hr  pain escalation is gradual but he takes the prn at the plateau of maximal pain  Comfortable roughly 5 hours  PHQ2 negative  Insomnia, issues with sleep onset, thinking about his disease  He randall through reflection  Recently celebrated one month of sobriety                  PERTINENT PMH REVIEWED:  [ ] YES [ ] NO           SOCIAL HISTORY:  Significant other/partner:  [ ] YES  [ ] NO            Children:  [ ] YES  [ ] NO                   Baptist/Spirituality:  Substance hx:  [ ] YES   [ ] NO           Tobacco hx:  [ ] YES  [ ] NO             Alcohol hx: [ ] YES  [ ] NO        Home Opioid hx:  [ ] YES  [ ] NO   Living Situation: [ ] Home  [ ] Long term care  [ ] Rehab    REFERRALS:   [ ] Chaplaincy  [ ] Hospice  [ ] Child Life  [ ] Social Work  [ ] Case management [ ] Holistic Therapy     FAMILY HISTORY:  No pertinent family history in first degree relatives    [ x] Family history non contributory     BASELINE ADLs (prior to admission):  Independent [x ] moderately [ ] fully   Dependent   [ ] moderately [ ] fully    ADVANCE DIRECTIVES:  [ ] YES [x ] NO   DNR [ ] YES [x ] NO                      MOLST  [ ] YES [ x] NO    Living Will  [ ] YES [x ] NO    Health Care Proxy [ ] YES  [x ] NO      [x ] Surrogate  [ ] HCP  [ ] Guardian:     Unbefriended, has adult children but no   idea about their whereabouts                                                        Phone#:    Allergies    No Known Allergies    Intolerances        MEDICATIONS  (STANDING):  calcium carbonate 1250 mG + Vitamin D (OsCal 500 + D) 1 Tablet(s) Oral daily  dexamethasone     Tablet 6 milliGRAM(s) Oral once  methadone    Tablet 5 milliGRAM(s) Oral <User Schedule>  methadone    Tablet 2.5 milliGRAM(s) Oral <User Schedule>  pregabalin 75 milliGRAM(s) Oral at bedtime  propranolol 10 milliGRAM(s) Oral every 12 hours    MEDICATIONS  (PRN):  HYDROmorphone  Injectable 3 milliGRAM(s) IV Push every 4 hours PRN Severe Pain (7 - 10)  naloxone Injectable 0.1 milliGRAM(s) IV Push every 3 minutes PRN Unarouseable or RR <11  polyethylene glycol 3350 17 Gram(s) Oral daily PRN Constipation      PRESENT SYMPTOMS:  Source: [ ] Patient   [ ] Family   [ ] Team     Pain: [x ] YES [ ] NO  OLDCARTS:  See above    Dyspnea: [x ] YES [ ] NO On exertion  Anxiety: [x ] YES [ ] NO Minimal  Fatigue: [x ] YES [ ] NO   Nausea: [ ] YES [ x] NO  Loss of appetite: [ ] YES [x ] NO   Constipation: [ ] YES [x ] NO     Other Symptoms:  [ x] All other review of systems negative   [ ] Unable to obtain due to poor mentation     Karnofsky Performance Score/Palliative Performance Status Version 2:     30     % due to Fx  Protein Calorie Malutrition:  [ ] Mild   [ ] Moderate   [ ] Severe     Vital Signs Last 24 Hrs  T(C): 36.7 (06 Feb 2018 06:16), Max: 37.2 (05 Feb 2018 21:37)  T(F): 98.1 (06 Feb 2018 06:16), Max: 99 (05 Feb 2018 21:37)  HR: 74 (06 Feb 2018 11:41) (72 - 97)  BP: 107/70 (06 Feb 2018 11:41) (106/68 - 122/83)  BP(mean): --  RR: 18 (06 Feb 2018 11:41) (16 - 18)  SpO2: 100% (06 Feb 2018 11:41) (96% - 100%)    Physical Exam:    General: [ x] Alert,  A&O x     [ ] lethargic   [ ] Agitated   [ ] Cachexia   HEENT: [ ] Normal   [ ] Dry mouth   [ ] ET Tube    [ ] Trach   Lungs: [ x] Clear [ ] Rhonchi  [ ] Crackles [ ] Wheezing [ ] Tachypnea  [ ] Audible excessive secretions   Cardiovascular:  [ ] Regular rate and rhythm  [ ] Irregular [ x] Tachycardia   [ ] Bradycardia   Abdomen: [ x] Soft  [ ] Distended  [ ]  [ ] +BS  [ ] Non tender [ x] Tender  [ ]PEG   [ ] NGT   Last BM:     Genitourinary: [x ] Normal [ ] Incontinent   [ ] Oliguria/Anuria   [ ] Knox  Musculoskeletal:  [ ] Normal   [ ] Generalized weakness  [ x] Bedbound   Neurological: [x ] No focal deficits  [ ] Cognitive impairment     Skin: [x ] Normal   [ ] Pressure ulcers     LABS:                        11.5   9.89  )-----------( 155      ( 06 Feb 2018 06:00 )             34.2     02-06    135  |  99  |  15  ----------------------------<  130<H>  4.4   |  22  |  0.67    Ca    8.4      06 Feb 2018 06:00  Phos  3.8     02-06  Mg     2.0     02-06    TPro  9.8<H>  /  Alb  3.2<L>  /  TBili  0.5  /  DBili  x   /  AST  114<H>  /  ALT  95<H>  /  AlkPhos  148<H>  02-06    PT/INR - ( 05 Feb 2018 06:05 )   PT: 12.8 SEC;   INR: 1.15              I&O's Summary      RADIOLOGY & ADDITIONAL STUDIES:

## 2018-02-06 NOTE — CONSULT NOTE ADULT - SUBJECTIVE AND OBJECTIVE BOX
46 yo Male with hx of drug abuse, hepatitis C, transferred from outside hospital for pathologic fracture. Patient initially presented to OSH two weeks ago for worsening lower back and left hip pain and found with fractures of the left sacrum, left ilium, and right acetabulum.     Denies fevers, chills, nausea, vomiting, hemoptysis, chest pain, SOB, abdominal pain, abdominal swelling, lower extremity swelling, rashes. Does have intermittent nose bleeds. (03 Feb 2018 04:49)      PAST MEDICAL & SURGICAL HISTORY:  IV drug abuse  Hepatitis C  No significant past surgical history      General: denies fevers, chills  Skin/Breast: denies rash   Ophthalmologic: denies blurry vision  ENMT: denies throat pain  Respiratory and Thorax: denies cough, denies shortness of breath  Cardiovascular: denies chest pain, palpitations. Denies LE swelling   Gastrointestinal: denies abdominal pain/ nausea/ vomiting/ diarrhea. Denies BRBPR/ melena   Genitourinary: Denies dysuria  Musculoskeletal: Denies mylagias   Neurological: Denies syncope  Psychiatric: Denies mood disturbance   Hematology/Lymphatics: denies bleeding/bruising. Denies skin lumps 	    MEDICATIONS  (STANDING):  calcium carbonate 1250 mG + Vitamin D (OsCal 500 + D) 1 Tablet(s) Oral daily  dexamethasone     Tablet 6 milliGRAM(s) Oral once  methadone    Tablet 5 milliGRAM(s) Oral <User Schedule>  methadone    Tablet 2.5 milliGRAM(s) Oral <User Schedule>  pregabalin 75 milliGRAM(s) Oral at bedtime  propranolol 10 milliGRAM(s) Oral every 12 hours    MEDICATIONS  (PRN):  HYDROmorphone  Injectable 3 milliGRAM(s) IV Push every 4 hours PRN Severe Pain (7 - 10)  naloxone Injectable 0.1 milliGRAM(s) IV Push every 3 minutes PRN Unarouseable or RR <11  polyethylene glycol 3350 17 Gram(s) Oral daily PRN Constipation      Allergies    No Known Allergies    Intolerances        SOCIAL HISTORY:    FAMILY HISTORY:  No pertinent family history in first degree relatives      Vital Signs Last 24 Hrs  T(C): 36.7 (06 Feb 2018 06:16), Max: 37.2 (05 Feb 2018 21:37)  T(F): 98.1 (06 Feb 2018 06:16), Max: 99 (05 Feb 2018 21:37)  HR: 74 (06 Feb 2018 11:41) (72 - 88)  BP: 107/70 (06 Feb 2018 11:41) (106/68 - 122/83)  BP(mean): --  RR: 18 (06 Feb 2018 11:41) (16 - 18)  SpO2: 100% (06 Feb 2018 11:41) (96% - 100%)    PHYSICAL EXAM:    GENERAL: NAD, AAOx3   HEAD:  NC/AT  EYES: EOMI, PERRLA, no scleral icterus  HEENT: Moist mucous membranes  LUNG: Clear to auscultation bilaterally; No rales, rhonchi, wheezing, or rubs  HEART: RRR; No murmurs, rubs, or gallops  ABDOMEN: +BS, ST/ND/NT  EXTREMITIES:  2+ Peripheral Pulses, No clubbing, cyanosis, or edema  LAD: no palpable adenopathy    LABS:                        11.5   9.89  )-----------( 155      ( 06 Feb 2018 06:00 )             34.2     02-06    135  |  99  |  15  ----------------------------<  130<H>  4.4   |  22  |  0.67    Ca    8.4      06 Feb 2018 06:00  Phos  3.8     02-06  Mg     2.0     02-06    TPro  9.8<H>  /  Alb  3.2<L>  /  TBili  0.5  /  DBili  x   /  AST  114<H>  /  ALT  95<H>  /  AlkPhos  148<H>  02-06    PT/INR - ( 05 Feb 2018 06:05 )   PT: 12.8 SEC;   INR: 1.15              Reticulocyte Percent: 3.3 % (02-06 @ 12:47)  Vitamin B12, Serum: 992 pg/mL (02-06 @ 12:47)  Folate, Serum: > 20.0 ng/mL (02-06 @ 12:47)        RADIOLOGY & ADDITIONAL STUDIES:  < from: CT Pelvis No Cont (02.04.18 @ 14:08) >  EXAM:  CT PELVIS ONLY        PROCEDURE DATE:  Feb 4 2018         INTERPRETATION:  CLINICAL INFORMATION: 47-year-old male with metastatic   liver malignancy presents for pathologic fracture of the left sacrum,   ilium and acetabulum    COMPARISON: CT abdomen and pelvis dated 1/21/2018.    PROCEDURE:   CT of the Pelvis was performed without intravenous contrast.   Intravenous contrast: None.  Oral contrast: None.  Sagittal and coronal reformats were performed.  3-D volumetric reconstructions were also obtained.    FINDINGS:    BLADDER: Bladder calculi layering on the right side posteriorly.  REPRODUCTIVE ORGANS: Unremarkable  LYMPH NODES: Partially visualized retroperitoneal adenopathy better seen   on the outside CT scan from 1/21/2018.    VISUALIZED PORTIONS:    ABDOMINAL ORGANS: Partially visualized left kidney is unremarkable.  BOWEL: Limited evaluation on this noncontrast study. No bowel obstruction.  PERITONEUM: No ascites.  VESSELS: Varices at the umbilicus, unchanged.  ABDOMINAL WALL: Mild body wall edema.    BONES: Again noted is a large soft tissue mass surrounding the left   sacrum and iliac bone involving the iliac is muscles and the adjacent   gluteus muscles. This results in a large lytic lesion within the left   sacrumand marked destruction of the adjacent left iliac bone extending   all the way through the iliac crest. There are many areas of large   cortical erosion and pathologic fracturing. There is resultant widening   of the left sacroiliac joint with tumor extending across the sacroiliac   joint. There is also a large lesion involving the right acetabulum with   involvement of all the acetabular walls with marked associated osseous   destruction greatest medially and extending into the adjacent right   superior pubic ramus. Soft tissue mass extends anteriorly and posteriorly.    IMPRESSION:   Large lytic lesion involving the entire right acetabulum with marked   osseous fragmentation as above.  Large lytic lesion involving the left sacrum and iliac bone which extends   across the sacroiliac joint with marked osseous destruction and   pathologic fracturing as above.  Partially included retroperitoneal adenopathy.              CRISTA CAMPOS M.D., RADIOLOGY FELLOW  This document has been electronically signed.  NICOLA KUO M.D., ATTENDING RADIOLOGIST  This document has been electronically signed. Feb 5 2018 10:34AM          < end of copied text > Hx taken from chart as pt was away getting scans off the floor     48 yo Male with hx of drug abuse, hepatitis C, transferred from outside hospital for pathologic fracture. Patient initially presented to OSH two weeks ago for worsening lower back and left hip pain and found with fractures of the left sacrum, left ilium, and right acetabulum.  Consulted to assist with work up.           PAST MEDICAL & SURGICAL HISTORY:  IV drug abuse  Hepatitis C  No significant past surgical history      ROS_ unable to obtain pt off the floor 	    MEDICATIONS  (STANDING):  calcium carbonate 1250 mG + Vitamin D (OsCal 500 + D) 1 Tablet(s) Oral daily  dexamethasone     Tablet 6 milliGRAM(s) Oral once  methadone    Tablet 5 milliGRAM(s) Oral <User Schedule>  methadone    Tablet 2.5 milliGRAM(s) Oral <User Schedule>  pregabalin 75 milliGRAM(s) Oral at bedtime  propranolol 10 milliGRAM(s) Oral every 12 hours    MEDICATIONS  (PRN):  HYDROmorphone  Injectable 3 milliGRAM(s) IV Push every 4 hours PRN Severe Pain (7 - 10)  naloxone Injectable 0.1 milliGRAM(s) IV Push every 3 minutes PRN Unarouseable or RR <11  polyethylene glycol 3350 17 Gram(s) Oral daily PRN Constipation      Allergies    No Known Allergies    Intolerances        SOCIAL HISTORY:    FAMILY HISTORY:  No pertinent family history in first degree relatives      Vital Signs Last 24 Hrs  T(C): 36.7 (06 Feb 2018 06:16), Max: 37.2 (05 Feb 2018 21:37)  T(F): 98.1 (06 Feb 2018 06:16), Max: 99 (05 Feb 2018 21:37)  HR: 74 (06 Feb 2018 11:41) (72 - 88)  BP: 107/70 (06 Feb 2018 11:41) (106/68 - 122/83)  BP(mean): --  RR: 18 (06 Feb 2018 11:41) (16 - 18)  SpO2: 100% (06 Feb 2018 11:41) (96% - 100%)    PHYSICAL EXAM:    - Pt off the floor     LABS:                        11.5   9.89  )-----------( 155      ( 06 Feb 2018 06:00 )             34.2     02-06    135  |  99  |  15  ----------------------------<  130<H>  4.4   |  22  |  0.67    Ca    8.4      06 Feb 2018 06:00  Phos  3.8     02-06  Mg     2.0     02-06    TPro  9.8<H>  /  Alb  3.2<L>  /  TBili  0.5  /  DBili  x   /  AST  114<H>  /  ALT  95<H>  /  AlkPhos  148<H>  02-06    PT/INR - ( 05 Feb 2018 06:05 )   PT: 12.8 SEC;   INR: 1.15              Reticulocyte Percent: 3.3 % (02-06 @ 12:47)  Vitamin B12, Serum: 992 pg/mL (02-06 @ 12:47)  Folate, Serum: > 20.0 ng/mL (02-06 @ 12:47)        RADIOLOGY & ADDITIONAL STUDIES:  < from: CT Pelvis No Cont (02.04.18 @ 14:08) >  EXAM:  CT PELVIS ONLY        PROCEDURE DATE:  Feb 4 2018         INTERPRETATION:  CLINICAL INFORMATION: 47-year-old male with metastatic   liver malignancy presents for pathologic fracture of the left sacrum,   ilium and acetabulum    COMPARISON: CT abdomen and pelvis dated 1/21/2018.    PROCEDURE:   CT of the Pelvis was performed without intravenous contrast.   Intravenous contrast: None.  Oral contrast: None.  Sagittal and coronal reformats were performed.  3-D volumetric reconstructions were also obtained.    FINDINGS:    BLADDER: Bladder calculi layering on the right side posteriorly.  REPRODUCTIVE ORGANS: Unremarkable  LYMPH NODES: Partially visualized retroperitoneal adenopathy better seen   on the outside CT scan from 1/21/2018.    VISUALIZED PORTIONS:    ABDOMINAL ORGANS: Partially visualized left kidney is unremarkable.  BOWEL: Limited evaluation on this noncontrast study. No bowel obstruction.  PERITONEUM: No ascites.  VESSELS: Varices at the umbilicus, unchanged.  ABDOMINAL WALL: Mild body wall edema.    BONES: Again noted is a large soft tissue mass surrounding the left   sacrum and iliac bone involving the iliac is muscles and the adjacent   gluteus muscles. This results in a large lytic lesion within the left   sacrumand marked destruction of the adjacent left iliac bone extending   all the way through the iliac crest. There are many areas of large   cortical erosion and pathologic fracturing. There is resultant widening   of the left sacroiliac joint with tumor extending across the sacroiliac   joint. There is also a large lesion involving the right acetabulum with   involvement of all the acetabular walls with marked associated osseous   destruction greatest medially and extending into the adjacent right   superior pubic ramus. Soft tissue mass extends anteriorly and posteriorly.    IMPRESSION:   Large lytic lesion involving the entire right acetabulum with marked   osseous fragmentation as above.  Large lytic lesion involving the left sacrum and iliac bone which extends   across the sacroiliac joint with marked osseous destruction and   pathologic fracturing as above.  Partially included retroperitoneal adenopathy.              CRISTA CAMPOS M.D., RADIOLOGY FELLOW  This document has been electronically signed.  NICOLA KUO M.D., ATTENDING RADIOLOGIST  This document has been electronically signed. Feb 5 2018 10:34AM          < end of copied text >

## 2018-02-06 NOTE — PROGRESS NOTE ADULT - ASSESSMENT
46 y/o M with hx of HCV (Tx Naive), CLBP, Hx of IVDU, Liver Lesion s/p bx (at [facility]) was transferred for treatment of pathologic fractures of the ilium, acetabulum and sacrum.    Impression:  1) Cirrhosis - suspect due to HCV. MELD-Na Score of 8. OSH CT imaging suggestive of portal hypertension.  2) HCV - treatment naive  3) Liver lesion - s/p biopsy at OSH, with findings suggesting pancreaticobiliary or lung primary (however other sites cannot be excluded). CA 19-9 is elevated. MRI was indeterminate at OSH.  4) Pathologic Fractures    Plan:  - F/u HCV genotype  - F/u pending HBV serologies  - Recommend Bx of metastatic bony lesions for diagnosis  - Would defer HCV treatment at this time given malignancy workup in progress 46 y/o M with hx of HCV (Tx Naive), CLBP, Hx of IVDU, Liver Lesion s/p bx (at [facility]) was transferred for treatment of pathologic fractures of the ilium, acetabulum and sacrum.    Impression:  1) Cirrhosis - suspect due to HCV. MELD-Na Score of 8. OSH CT imaging suggestive of portal hypertension.  2) HCV - treatment naive  3) Liver lesion  4) Metastases - s/p pelvic mass bx at OSH, with findings suggesting pancreaticobiliary or lung primary (however other sites cannot be excluded). CA 19-9 is elevated. MRI was indeterminate at OSH. Unclear primary malignancy, consdier testicular cancer given retroperitoneal LAD.  4) Pathologic Fractures    Plan:  - F/u HCV genotype  - F/u pending HBV serologies  - Consider Testicular US to evaluate for testicular mass given young patient with RP LAD  - Consider repeat Bx of metastatic bony lesions for diagnosis  - Would defer HCV treatment at this time given malignancy workup in progress 48 y/o M with hx of HCV (Tx Naive), CLBP, Hx of IVDU, Liver Lesion s/p bx (at [facility]) was transferred for treatment of pathologic fractures of the ilium, acetabulum and sacrum.    Impression:  1) Cirrhosis - suspect due to HCV. MELD-Na Score of 8. OSH CT imaging suggestive of portal hypertension.  2) HCV - treatment naive  3) Liver lesion  4) Metastases - s/p pelvic mass bx at OSH, with findings suggestive pancreaticobiliary or lung primary (however other sites cannot be excluded). CA 19-9 is elevated. MRI was indeterminate at OSH. Unclear primary malignancy, consdier testicular cancer given retroperitoneal LAD.  4) Pathologic Fractures    Plan:  - F/u HCV genotype  - F/u pending HBV serologies  - Consider Testicular US to evaluate for testicular mass given young patient with RP LAD  - Discuss with oncology regarding further workup of pelvic mass  - Would defer HCV treatment at this time given malignancy workup in progress

## 2018-02-06 NOTE — CONSULT NOTE ADULT - ATTENDING COMMENTS
47 M chronic HCV, h/o drug abuse who was transferred from OSH with newly dx hip fracture. This was found to be 'pathologic fracture' and found to have a 'liver mass' on imaging.  Unclear if has cirrhosis, though likely given plt 120s.  As per patient had bx of liver lesion (AFP normal) but was 'full of dead cells and not conclusive".  Unclear if this is a primary liver lesion or an additional met to liver as well as bone.      Impression:  1) Liver Mass  2) Chronic HCV  3) Pathologic hip fractures  4) h/o drug abuse    Plan:  1) Agree with either MRI of Liver or Triple Phase CT scan to gauge liver lesion.     2) Repeat AFP, CA 19-9 here  3) Would get genotype/pcr of HCV  4) Please get OS pathology records from biopsy  5) Pending above - we can decide on next steps of this liver lesion
I have reviewed and agree with note as written above  patient seen and examined, with recently diagnosed hepatocellular carcinoma and increasing L> R Lower extremity pain with paresthesias, mild weakness in left leg and foot and right posterior hip pain  Imaging reveals large destructive lesion in L hemisacrum and posterior ilium with sacral nerve root compression, and Right medial acetabular destruction with pathologic fracture.  Currently with difficulty bearing weight on L> R side secondary to pain.    Recommend RT as primary palliative measure  Spine surgery to look at sacral lesion  consider steroid use to decrease inflammation / nerve compression in sacrum,   Can consider minimally invasive screw fixation of acetabulum if pain isnt controlled with RT.  Patient with poor prognosis and poor surgical candidate  Will follow with you    Moises Back MD  Musculoskeletal Oncology  [phone]
Pt seen and examined. PT with hx of drug use, HCV with diffuse bone disease. A/w with work up as above. TIssue bx for identification of primary.

## 2018-02-06 NOTE — CONSULT NOTE ADULT - PROBLEM SELECTOR RECOMMENDATION 3
Treatment naive  Genotypic analysis results forthcoming which will dictate treatment options  Gastroenterology team involved

## 2018-02-06 NOTE — PROGRESS NOTE ADULT - PROBLEM SELECTOR PLAN 2
As per outside records, positive IgG. Complicated by liver cirrhosis, esophageal varices, portal HTN. Outside labs show thrombocytopenia,, transaminitis,, and elevated alk phos, synthetic function preserved (INR wnl)  - f/u genotype  - Workup per hepatology pending As per outside records, positive IgG. Complicated by liver cirrhosis, esophageal varices, portal HTN. Outside labs show thrombocytopenia,, transaminitis,, and elevated alk phos, synthetic function preserved (INR wnl)  - f/u HCV genotype  - Workup per hepatology pending, defer treatment for HCV at this time given malignancy workup in progress

## 2018-02-06 NOTE — PROGRESS NOTE ADULT - PROBLEM SELECTOR PLAN 1
left ilium, right acetabulum, and left sacrum in setting of newly diagnosed malignancy. CT findings c/w metastatic dz - primary most likely HCC  - Pain control with opioids, titrate regimen as needed  - Bowel regimen, currently denies constipation  - f/u MR femur and MRI pelvis as per ortho recs  - appreciate ortho recs, steroid (decadron) as per ortho  - appreciate rad/onc input - potential candidate for radiation therapy, to be coordinated left ilium, right acetabulum, and left sacrum in setting of newly diagnosed malignancy. CT findings c/w metastatic dz - primary to be determined  - outside path suggestive of pancreaticobiliary or lung primary (though necrotic tissue with minute focus of malignant tumor), d/w onc, will consider repeat biopsy for further diagnosis (? IR biopsy)  - Pain control with opioids, titrate regimen as needed, appreciate palliative assistance with pain regimen, currently reports pain improved  - Bowel regimen, currently denies constipation  - f/u MR femur and MRI pelvis as per ortho recs  - appreciate ortho recs, steroid (decadron) as per ortho  - appreciate rad/onc input - potential candidate for radiation therapy, to be coordinated

## 2018-02-06 NOTE — CONSULT NOTE ADULT - PROBLEM SELECTOR RECOMMENDATION 5
Immunohistochemistry findings suggest pancreaticobiliary or lung primary  Oncology involvement to determine DMT candidacy

## 2018-02-06 NOTE — PROGRESS NOTE ADULT - ATTENDING COMMENTS
Patient seen and examined, d/w HS, agree with above.    48 yo M IVDU, Hepatitis C (untreated) complicated by cirrhosis and ascites, suspected malignancy with pathological fractures transferred from outside hospital for further management. Metastatic cancer pain w/ pathological fractures, appreciate palliative input regarding adjusting pain regimen for better pain control. C/w bowel regimen. F/u ortho and rad/onc recs. Appreciate GI assistance re: HCV (genotype pending, defer tx), and onc input re: further workup (biospy/labs/imaging) to determine final diagnosis malignancy and extent of disease as well as additional treatment options once diagnosis confirmed...

## 2018-02-06 NOTE — PROGRESS NOTE ADULT - PROBLEM SELECTOR PLAN 3
stage IV. Liver is likely the primary source as per imaging.  - further therapy to be determined stage IV. Liver is likely the primary source as per imaging, though outside path suggesting pancreaticobiliary vs lung primary  - oncology suggesting CT scan w/ contrast to eval extent of disease, will see if can upload outside imaging and review with onc to see if adequate for purpose, if not can obtain further imaging here  - attempt to obtain additional tissue dx as above...  - further therapy to be determined

## 2018-02-06 NOTE — CONSULT NOTE ADULT - PROBLEM SELECTOR RECOMMENDATION 6
Due to the general stress of his current medical condition  I would not recommend benzodiazepines given his methadone use which may put him at risk for significant respiratory depression  Consider non pharmacologic sleep hygiene techniques

## 2018-02-07 LAB
ALBUMIN SERPL ELPH-MCNC: 3.2 G/DL — LOW (ref 3.3–5)
ALP SERPL-CCNC: 129 U/L — HIGH (ref 40–120)
ALT FLD-CCNC: 94 U/L — HIGH (ref 4–41)
AST SERPL-CCNC: 118 U/L — HIGH (ref 4–40)
BASOPHILS # BLD AUTO: 0 K/UL — SIGNIFICANT CHANGE UP (ref 0–0.2)
BASOPHILS NFR BLD AUTO: 0 % — SIGNIFICANT CHANGE UP (ref 0–2)
BILIRUB SERPL-MCNC: 0.5 MG/DL — SIGNIFICANT CHANGE UP (ref 0.2–1.2)
BUN SERPL-MCNC: 22 MG/DL — SIGNIFICANT CHANGE UP (ref 7–23)
CALCIUM SERPL-MCNC: 8.3 MG/DL — LOW (ref 8.4–10.5)
CANCER AG19-9 SERPL-ACNC: 93.6 U/ML — HIGH
CHLORIDE SERPL-SCNC: 100 MMOL/L — SIGNIFICANT CHANGE UP (ref 98–107)
CO2 SERPL-SCNC: 23 MMOL/L — SIGNIFICANT CHANGE UP (ref 22–31)
CREAT SERPL-MCNC: 0.58 MG/DL — SIGNIFICANT CHANGE UP (ref 0.5–1.3)
EOSINOPHIL # BLD AUTO: 0 K/UL — SIGNIFICANT CHANGE UP (ref 0–0.5)
EOSINOPHIL NFR BLD AUTO: 0 % — SIGNIFICANT CHANGE UP (ref 0–6)
GLUCOSE SERPL-MCNC: 107 MG/DL — HIGH (ref 70–99)
HCG SERPL-ACNC: < 5 MIU/ML — SIGNIFICANT CHANGE UP
HCT VFR BLD CALC: 32.1 % — LOW (ref 39–50)
HEV AB FLD QL: NEGATIVE — SIGNIFICANT CHANGE UP
HGB BLD-MCNC: 10.6 G/DL — LOW (ref 13–17)
IMM GRANULOCYTES # BLD AUTO: 0.05 # — SIGNIFICANT CHANGE UP
IMM GRANULOCYTES NFR BLD AUTO: 0.5 % — SIGNIFICANT CHANGE UP (ref 0–1.5)
LYMPHOCYTES # BLD AUTO: 0.81 K/UL — LOW (ref 1–3.3)
LYMPHOCYTES # BLD AUTO: 7.7 % — LOW (ref 13–44)
MAGNESIUM SERPL-MCNC: 2.3 MG/DL — SIGNIFICANT CHANGE UP (ref 1.6–2.6)
MCHC RBC-ENTMCNC: 28.2 PG — SIGNIFICANT CHANGE UP (ref 27–34)
MCHC RBC-ENTMCNC: 33 % — SIGNIFICANT CHANGE UP (ref 32–36)
MCV RBC AUTO: 85.4 FL — SIGNIFICANT CHANGE UP (ref 80–100)
MONOCYTES # BLD AUTO: 0.69 K/UL — SIGNIFICANT CHANGE UP (ref 0–0.9)
MONOCYTES NFR BLD AUTO: 6.6 % — SIGNIFICANT CHANGE UP (ref 2–14)
NEUTROPHILS # BLD AUTO: 8.96 K/UL — HIGH (ref 1.8–7.4)
NEUTROPHILS NFR BLD AUTO: 85.2 % — HIGH (ref 43–77)
NRBC # FLD: 0 — SIGNIFICANT CHANGE UP
PHOSPHATE SERPL-MCNC: 3.4 MG/DL — SIGNIFICANT CHANGE UP (ref 2.5–4.5)
PLATELET # BLD AUTO: 144 K/UL — LOW (ref 150–400)
PMV BLD: 11.5 FL — SIGNIFICANT CHANGE UP (ref 7–13)
POTASSIUM SERPL-MCNC: 4.3 MMOL/L — SIGNIFICANT CHANGE UP (ref 3.5–5.3)
POTASSIUM SERPL-SCNC: 4.3 MMOL/L — SIGNIFICANT CHANGE UP (ref 3.5–5.3)
PROT SERPL-MCNC: 8.8 G/DL — HIGH (ref 6–8.3)
PSA FREE FLD-MCNC: 33.3 — SIGNIFICANT CHANGE UP
PSA FREE FLD-MCNC: SIGNIFICANT CHANGE UP
PSA FREE MFR FLD: 0.03 — SIGNIFICANT CHANGE UP
RBC # BLD: 3.76 M/UL — LOW (ref 4.2–5.8)
RBC # FLD: 15.1 % — HIGH (ref 10.3–14.5)
SODIUM SERPL-SCNC: 136 MMOL/L — SIGNIFICANT CHANGE UP (ref 135–145)
WBC # BLD: 10.51 K/UL — HIGH (ref 3.8–10.5)
WBC # FLD AUTO: 10.51 K/UL — HIGH (ref 3.8–10.5)

## 2018-02-07 PROCEDURE — 99233 SBSQ HOSP IP/OBS HIGH 50: CPT | Mod: GC

## 2018-02-07 RX ORDER — HYDROMORPHONE HYDROCHLORIDE 2 MG/ML
3 INJECTION INTRAMUSCULAR; INTRAVENOUS; SUBCUTANEOUS ONCE
Qty: 0 | Refills: 0 | Status: DISCONTINUED | OUTPATIENT
Start: 2018-02-07 | End: 2018-02-07

## 2018-02-07 RX ORDER — DEXAMETHASONE 0.5 MG/5ML
4 ELIXIR ORAL ONCE
Qty: 0 | Refills: 0 | Status: COMPLETED | OUTPATIENT
Start: 2018-02-07 | End: 2018-02-07

## 2018-02-07 RX ADMIN — HYDROMORPHONE HYDROCHLORIDE 3 MILLIGRAM(S): 2 INJECTION INTRAMUSCULAR; INTRAVENOUS; SUBCUTANEOUS at 18:50

## 2018-02-07 RX ADMIN — HYDROMORPHONE HYDROCHLORIDE 3 MILLIGRAM(S): 2 INJECTION INTRAMUSCULAR; INTRAVENOUS; SUBCUTANEOUS at 02:22

## 2018-02-07 RX ADMIN — HYDROMORPHONE HYDROCHLORIDE 3 MILLIGRAM(S): 2 INJECTION INTRAMUSCULAR; INTRAVENOUS; SUBCUTANEOUS at 19:49

## 2018-02-07 RX ADMIN — HYDROMORPHONE HYDROCHLORIDE 3 MILLIGRAM(S): 2 INJECTION INTRAMUSCULAR; INTRAVENOUS; SUBCUTANEOUS at 10:12

## 2018-02-07 RX ADMIN — HYDROMORPHONE HYDROCHLORIDE 3 MILLIGRAM(S): 2 INJECTION INTRAMUSCULAR; INTRAVENOUS; SUBCUTANEOUS at 23:00

## 2018-02-07 RX ADMIN — HYDROMORPHONE HYDROCHLORIDE 3 MILLIGRAM(S): 2 INJECTION INTRAMUSCULAR; INTRAVENOUS; SUBCUTANEOUS at 14:21

## 2018-02-07 RX ADMIN — Medication 10 MILLIGRAM(S): at 18:36

## 2018-02-07 RX ADMIN — HYDROMORPHONE HYDROCHLORIDE 3 MILLIGRAM(S): 2 INJECTION INTRAMUSCULAR; INTRAVENOUS; SUBCUTANEOUS at 06:25

## 2018-02-07 RX ADMIN — METHADONE HYDROCHLORIDE 5 MILLIGRAM(S): 40 TABLET ORAL at 14:20

## 2018-02-07 RX ADMIN — METHADONE HYDROCHLORIDE 2.5 MILLIGRAM(S): 40 TABLET ORAL at 06:09

## 2018-02-07 RX ADMIN — HYDROMORPHONE HYDROCHLORIDE 3 MILLIGRAM(S): 2 INJECTION INTRAMUSCULAR; INTRAVENOUS; SUBCUTANEOUS at 19:34

## 2018-02-07 RX ADMIN — HYDROMORPHONE HYDROCHLORIDE 3 MILLIGRAM(S): 2 INJECTION INTRAMUSCULAR; INTRAVENOUS; SUBCUTANEOUS at 22:44

## 2018-02-07 RX ADMIN — HYDROMORPHONE HYDROCHLORIDE 3 MILLIGRAM(S): 2 INJECTION INTRAMUSCULAR; INTRAVENOUS; SUBCUTANEOUS at 02:07

## 2018-02-07 RX ADMIN — HYDROMORPHONE HYDROCHLORIDE 3 MILLIGRAM(S): 2 INJECTION INTRAMUSCULAR; INTRAVENOUS; SUBCUTANEOUS at 14:36

## 2018-02-07 RX ADMIN — HYDROMORPHONE HYDROCHLORIDE 3 MILLIGRAM(S): 2 INJECTION INTRAMUSCULAR; INTRAVENOUS; SUBCUTANEOUS at 06:10

## 2018-02-07 RX ADMIN — METHADONE HYDROCHLORIDE 5 MILLIGRAM(S): 40 TABLET ORAL at 22:43

## 2018-02-07 RX ADMIN — HYDROMORPHONE HYDROCHLORIDE 3 MILLIGRAM(S): 2 INJECTION INTRAMUSCULAR; INTRAVENOUS; SUBCUTANEOUS at 10:27

## 2018-02-07 RX ADMIN — Medication 4 MILLIGRAM(S): at 07:58

## 2018-02-07 RX ADMIN — Medication 1 TABLET(S): at 14:20

## 2018-02-07 RX ADMIN — HYDROMORPHONE HYDROCHLORIDE 3 MILLIGRAM(S): 2 INJECTION INTRAMUSCULAR; INTRAVENOUS; SUBCUTANEOUS at 18:35

## 2018-02-07 RX ADMIN — Medication 10 MILLIGRAM(S): at 06:09

## 2018-02-07 RX ADMIN — Medication 75 MILLIGRAM(S): at 22:43
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
## 2018-02-07 NOTE — PROGRESS NOTE ADULT - PROBLEM SELECTOR PLAN 2
As per outside records, positive IgG. Complicated by liver cirrhosis, esophageal varices, portal HTN. Outside labs show thrombocytopenia,, transaminitis,, and elevated alk phos, synthetic function preserved (INR wnl). HCV genotype 1a  - Workup per hepatology pending, defer treatment for HCV at this time given malignancy workup in progress

## 2018-02-07 NOTE — PROGRESS NOTE ADULT - PROBLEM SELECTOR PLAN 1
left ilium, right acetabulum, and left sacrum in setting of newly diagnosed malignancy. CT findings c/w metastatic dz - primary to be determined  - outside path suggestive of pancreaticobiliary or lung primary (though necrotic tissue with minute focus of malignant tumor), d/w onc, will repeat biopsy through IR for further diagnosis - scheduled for biopsy tomorrow  - Pain not well-controlled overnight with new regimen, titrate regimen as needed, appreciate palliative assistance with pain management  - Bowel regimen, currently denies constipation  - f/u MR femur and MRI pelvis as per ortho recs  - appreciate ortho recs, steroid (decadron) as per ortho  - appreciate rad/onc input - potential candidate for radiation therapy, to be coordinated

## 2018-02-07 NOTE — PROGRESS NOTE ADULT - PROBLEM SELECTOR PLAN 3
stage IV. Liver is likely the primary source as per imaging, though outside path suggesting pancreaticobiliary vs lung primary  -plan for IR biopsy of left pelvic mass tomorrow  -patient will be NPO at midnight  -appreciate Oncology recs; OSH CT imaging uploaded   - further therapy to be determined

## 2018-02-07 NOTE — PROGRESS NOTE ADULT - SUBJECTIVE AND OBJECTIVE BOX
Patient is a 47y old  Male who presents with a chief complaint of hip pain (03 Feb 2018 12:32)      SUBJECTIVE / OVERNIGHT EVENTS:  Patient complaining of inability to sleep overnight due to pain on new pain regimen. Patient currently experiencing 8/10 pain in LE b/l L>R. States that he feels left pelvic mass has grown over past few weeks. Denies fevers, chills, SOB, CP, n/v/d/c.      MEDICATIONS  (STANDING):  calcium carbonate 1250 mG + Vitamin D (OsCal 500 + D) 1 Tablet(s) Oral daily  methadone    Tablet 5 milliGRAM(s) Oral <User Schedule>  methadone    Tablet 2.5 milliGRAM(s) Oral <User Schedule>  pregabalin 75 milliGRAM(s) Oral at bedtime  propranolol 10 milliGRAM(s) Oral every 12 hours    MEDICATIONS  (PRN):  HYDROmorphone  Injectable 3 milliGRAM(s) IV Push every 4 hours PRN Severe Pain (7 - 10)  naloxone Injectable 0.1 milliGRAM(s) IV Push every 3 minutes PRN Unarouseable or RR <11  polyethylene glycol 3350 17 Gram(s) Oral daily PRN Constipation        CAPILLARY BLOOD GLUCOSE        I&O's Summary      PHYSICAL EXAM:  VS: T 97.6, HR 66, /78, RR 18, SpO2 99%  GENERAL: Mildly distressed about LE pain  HEAD:  Atraumatic, Normocephalic  EYES: EOMI, conjunctiva and sclera clear  NECK: Supple, No JVD  CHEST/LUNG: Clear to auscultation bilaterally; No wheeze  HEART: Regular rate and rhythm; No murmurs, rubs, or gallops  ABDOMEN: Soft, Nontender, Nondistended  EXTREMITIES: Limited ROM due to pain in LE. L>R  PSYCH: AAOx3  SKIN: No rashes or lesions    LABS:                        10.6   10.51 )-----------( 144      ( 07 Feb 2018 06:45 )             32.1     02-06    135  |  99  |  15  ----------------------------<  130<H>  4.4   |  22  |  0.67    Ca    8.4      06 Feb 2018 06:00  Phos  3.8     02-06  Mg     2.0     02-06    TPro  9.8<H>  /  Alb  3.2<L>  /  TBili  0.5  /  DBili  x   /  AST  114<H>  /  ALT  95<H>  /  AlkPhos  148<H>  02-06              RADIOLOGY & ADDITIONAL TESTS:  Bone Scan 2/6:  IMPRESSION: Abnormal bone scan. Metastatic disease involving the left iliac   bone and sacrum as well as the right acetabulum.     Traumatic focus costochondral junction of a right upper rib.       No other osseous abnormalities are identified.

## 2018-02-07 NOTE — PROGRESS NOTE ADULT - ASSESSMENT
48 yo Male with hx of drug abuse, hepatitis C, chronic back pain, transferred from outside hospital for pathologic fractures of left ilium, right acetabulum, and left sacrum, undergoing further malignancy workup.

## 2018-02-08 DIAGNOSIS — E83.39 OTHER DISORDERS OF PHOSPHORUS METABOLISM: ICD-10-CM

## 2018-02-08 LAB
ALBUMIN SERPL ELPH-MCNC: 2.9 G/DL — LOW (ref 3.3–5)
ALP SERPL-CCNC: 113 U/L — SIGNIFICANT CHANGE UP (ref 40–120)
ALT FLD-CCNC: 88 U/L — HIGH (ref 4–41)
APTT BLD: 29.1 SEC — SIGNIFICANT CHANGE UP (ref 27.5–37.4)
AST SERPL-CCNC: 106 U/L — HIGH (ref 4–40)
BASOPHILS # BLD AUTO: 0.01 K/UL — SIGNIFICANT CHANGE UP (ref 0–0.2)
BASOPHILS NFR BLD AUTO: 0.1 % — SIGNIFICANT CHANGE UP (ref 0–2)
BILIRUB SERPL-MCNC: 0.4 MG/DL — SIGNIFICANT CHANGE UP (ref 0.2–1.2)
BUN SERPL-MCNC: 21 MG/DL — SIGNIFICANT CHANGE UP (ref 7–23)
CALCIUM SERPL-MCNC: 8.4 MG/DL — SIGNIFICANT CHANGE UP (ref 8.4–10.5)
CHLORIDE SERPL-SCNC: 103 MMOL/L — SIGNIFICANT CHANGE UP (ref 98–107)
CO2 SERPL-SCNC: 25 MMOL/L — SIGNIFICANT CHANGE UP (ref 22–31)
CREAT SERPL-MCNC: 0.61 MG/DL — SIGNIFICANT CHANGE UP (ref 0.5–1.3)
EOSINOPHIL # BLD AUTO: 0.05 K/UL — SIGNIFICANT CHANGE UP (ref 0–0.5)
EOSINOPHIL NFR BLD AUTO: 0.6 % — SIGNIFICANT CHANGE UP (ref 0–6)
GLUCOSE SERPL-MCNC: 91 MG/DL — SIGNIFICANT CHANGE UP (ref 70–99)
HCT VFR BLD CALC: 32.5 % — LOW (ref 39–50)
HGB BLD-MCNC: 10.5 G/DL — LOW (ref 13–17)
IMM GRANULOCYTES # BLD AUTO: 0.09 # — SIGNIFICANT CHANGE UP
IMM GRANULOCYTES NFR BLD AUTO: 1.1 % — SIGNIFICANT CHANGE UP (ref 0–1.5)
INR BLD: 1.11 — SIGNIFICANT CHANGE UP (ref 0.88–1.17)
LYMPHOCYTES # BLD AUTO: 0.95 K/UL — LOW (ref 1–3.3)
LYMPHOCYTES # BLD AUTO: 12 % — LOW (ref 13–44)
MAGNESIUM SERPL-MCNC: 1.9 MG/DL — SIGNIFICANT CHANGE UP (ref 1.6–2.6)
MCHC RBC-ENTMCNC: 27.9 PG — SIGNIFICANT CHANGE UP (ref 27–34)
MCHC RBC-ENTMCNC: 32.3 % — SIGNIFICANT CHANGE UP (ref 32–36)
MCV RBC AUTO: 86.2 FL — SIGNIFICANT CHANGE UP (ref 80–100)
MONOCYTES # BLD AUTO: 0.72 K/UL — SIGNIFICANT CHANGE UP (ref 0–0.9)
MONOCYTES NFR BLD AUTO: 9.1 % — SIGNIFICANT CHANGE UP (ref 2–14)
NEUTROPHILS # BLD AUTO: 6.11 K/UL — SIGNIFICANT CHANGE UP (ref 1.8–7.4)
NEUTROPHILS NFR BLD AUTO: 77.1 % — HIGH (ref 43–77)
NRBC # FLD: 0 — SIGNIFICANT CHANGE UP
PHOSPHATE SERPL-MCNC: 2.3 MG/DL — LOW (ref 2.5–4.5)
PLATELET # BLD AUTO: 125 K/UL — LOW (ref 150–400)
PMV BLD: 11.4 FL — SIGNIFICANT CHANGE UP (ref 7–13)
POTASSIUM SERPL-MCNC: 3.9 MMOL/L — SIGNIFICANT CHANGE UP (ref 3.5–5.3)
POTASSIUM SERPL-SCNC: 3.9 MMOL/L — SIGNIFICANT CHANGE UP (ref 3.5–5.3)
PROT SERPL-MCNC: 8.3 G/DL — SIGNIFICANT CHANGE UP (ref 6–8.3)
PROTHROM AB SERPL-ACNC: 12.8 SEC — SIGNIFICANT CHANGE UP (ref 9.8–13.1)
RBC # BLD: 3.77 M/UL — LOW (ref 4.2–5.8)
RBC # FLD: 15.4 % — HIGH (ref 10.3–14.5)
SODIUM SERPL-SCNC: 138 MMOL/L — SIGNIFICANT CHANGE UP (ref 135–145)
WBC # BLD: 7.93 K/UL — SIGNIFICANT CHANGE UP (ref 3.8–10.5)
WBC # FLD AUTO: 7.93 K/UL — SIGNIFICANT CHANGE UP (ref 3.8–10.5)

## 2018-02-08 PROCEDURE — 99232 SBSQ HOSP IP/OBS MODERATE 35: CPT | Mod: GC

## 2018-02-08 PROCEDURE — 99233 SBSQ HOSP IP/OBS HIGH 50: CPT

## 2018-02-08 PROCEDURE — 99233 SBSQ HOSP IP/OBS HIGH 50: CPT | Mod: GC

## 2018-02-08 PROCEDURE — 77012 CT SCAN FOR NEEDLE BIOPSY: CPT | Mod: 26

## 2018-02-08 PROCEDURE — 88341 IMHCHEM/IMCYTCHM EA ADD ANTB: CPT | Mod: 26,59

## 2018-02-08 PROCEDURE — 49180 BIOPSY ABDOMINAL MASS: CPT

## 2018-02-08 PROCEDURE — 88342 IMHCHEM/IMCYTCHM 1ST ANTB: CPT | Mod: 26

## 2018-02-08 PROCEDURE — 88305 TISSUE EXAM BY PATHOLOGIST: CPT | Mod: 26

## 2018-02-08 RX ORDER — HYDROMORPHONE HYDROCHLORIDE 2 MG/ML
4 INJECTION INTRAMUSCULAR; INTRAVENOUS; SUBCUTANEOUS
Qty: 0 | Refills: 0 | Status: DISCONTINUED | OUTPATIENT
Start: 2018-02-08 | End: 2018-02-09

## 2018-02-08 RX ORDER — SODIUM,POTASSIUM PHOSPHATES 278-250MG
1 POWDER IN PACKET (EA) ORAL
Qty: 0 | Refills: 0 | Status: DISCONTINUED | OUTPATIENT
Start: 2018-02-08 | End: 2018-02-08

## 2018-02-08 RX ORDER — HYDROMORPHONE HYDROCHLORIDE 2 MG/ML
2 INJECTION INTRAMUSCULAR; INTRAVENOUS; SUBCUTANEOUS ONCE
Qty: 0 | Refills: 0 | Status: DISCONTINUED | OUTPATIENT
Start: 2018-02-08 | End: 2018-02-08

## 2018-02-08 RX ORDER — SODIUM,POTASSIUM PHOSPHATES 278-250MG
1 POWDER IN PACKET (EA) ORAL
Qty: 0 | Refills: 0 | Status: COMPLETED | OUTPATIENT
Start: 2018-02-08 | End: 2018-02-09

## 2018-02-08 RX ORDER — LANOLIN ALCOHOL/MO/W.PET/CERES
3 CREAM (GRAM) TOPICAL AT BEDTIME
Qty: 0 | Refills: 0 | Status: DISCONTINUED | OUTPATIENT
Start: 2018-02-08 | End: 2018-02-16

## 2018-02-08 RX ORDER — HYDROMORPHONE HYDROCHLORIDE 2 MG/ML
3 INJECTION INTRAMUSCULAR; INTRAVENOUS; SUBCUTANEOUS ONCE
Qty: 0 | Refills: 0 | Status: DISCONTINUED | OUTPATIENT
Start: 2018-02-08 | End: 2018-02-13

## 2018-02-08 RX ADMIN — HYDROMORPHONE HYDROCHLORIDE 4 MILLIGRAM(S): 2 INJECTION INTRAMUSCULAR; INTRAVENOUS; SUBCUTANEOUS at 23:52

## 2018-02-08 RX ADMIN — HYDROMORPHONE HYDROCHLORIDE 4 MILLIGRAM(S): 2 INJECTION INTRAMUSCULAR; INTRAVENOUS; SUBCUTANEOUS at 11:24

## 2018-02-08 RX ADMIN — HYDROMORPHONE HYDROCHLORIDE 4 MILLIGRAM(S): 2 INJECTION INTRAMUSCULAR; INTRAVENOUS; SUBCUTANEOUS at 19:03

## 2018-02-08 RX ADMIN — Medication 1 TABLET(S): at 15:49

## 2018-02-08 RX ADMIN — HYDROMORPHONE HYDROCHLORIDE 4 MILLIGRAM(S): 2 INJECTION INTRAMUSCULAR; INTRAVENOUS; SUBCUTANEOUS at 16:04

## 2018-02-08 RX ADMIN — HYDROMORPHONE HYDROCHLORIDE 2 MILLIGRAM(S): 2 INJECTION INTRAMUSCULAR; INTRAVENOUS; SUBCUTANEOUS at 10:07

## 2018-02-08 RX ADMIN — HYDROMORPHONE HYDROCHLORIDE 4 MILLIGRAM(S): 2 INJECTION INTRAMUSCULAR; INTRAVENOUS; SUBCUTANEOUS at 15:49

## 2018-02-08 RX ADMIN — HYDROMORPHONE HYDROCHLORIDE 3 MILLIGRAM(S): 2 INJECTION INTRAMUSCULAR; INTRAVENOUS; SUBCUTANEOUS at 03:00

## 2018-02-08 RX ADMIN — METHADONE HYDROCHLORIDE 5 MILLIGRAM(S): 40 TABLET ORAL at 22:02

## 2018-02-08 RX ADMIN — Medication 10 MILLIGRAM(S): at 07:06

## 2018-02-08 RX ADMIN — METHADONE HYDROCHLORIDE 5 MILLIGRAM(S): 40 TABLET ORAL at 15:49

## 2018-02-08 RX ADMIN — POLYETHYLENE GLYCOL 3350 17 GRAM(S): 17 POWDER, FOR SOLUTION ORAL at 18:51

## 2018-02-08 RX ADMIN — HYDROMORPHONE HYDROCHLORIDE 3 MILLIGRAM(S): 2 INJECTION INTRAMUSCULAR; INTRAVENOUS; SUBCUTANEOUS at 02:42

## 2018-02-08 RX ADMIN — HYDROMORPHONE HYDROCHLORIDE 3 MILLIGRAM(S): 2 INJECTION INTRAMUSCULAR; INTRAVENOUS; SUBCUTANEOUS at 07:21

## 2018-02-08 RX ADMIN — HYDROMORPHONE HYDROCHLORIDE 3 MILLIGRAM(S): 2 INJECTION INTRAMUSCULAR; INTRAVENOUS; SUBCUTANEOUS at 07:06

## 2018-02-08 RX ADMIN — HYDROMORPHONE HYDROCHLORIDE 4 MILLIGRAM(S): 2 INJECTION INTRAMUSCULAR; INTRAVENOUS; SUBCUTANEOUS at 18:48

## 2018-02-08 RX ADMIN — Medication 75 MILLIGRAM(S): at 22:02

## 2018-02-08 RX ADMIN — HYDROMORPHONE HYDROCHLORIDE 4 MILLIGRAM(S): 2 INJECTION INTRAMUSCULAR; INTRAVENOUS; SUBCUTANEOUS at 21:06

## 2018-02-08 RX ADMIN — HYDROMORPHONE HYDROCHLORIDE 2 MILLIGRAM(S): 2 INJECTION INTRAMUSCULAR; INTRAVENOUS; SUBCUTANEOUS at 10:23

## 2018-02-08 RX ADMIN — METHADONE HYDROCHLORIDE 2.5 MILLIGRAM(S): 40 TABLET ORAL at 07:06

## 2018-02-08 RX ADMIN — HYDROMORPHONE HYDROCHLORIDE 4 MILLIGRAM(S): 2 INJECTION INTRAMUSCULAR; INTRAVENOUS; SUBCUTANEOUS at 11:09

## 2018-02-08 RX ADMIN — Medication 3 MILLIGRAM(S): at 22:27

## 2018-02-08 RX ADMIN — HYDROMORPHONE HYDROCHLORIDE 4 MILLIGRAM(S): 2 INJECTION INTRAMUSCULAR; INTRAVENOUS; SUBCUTANEOUS at 21:21

## 2018-02-08 NOTE — PROGRESS NOTE ADULT - ATTENDING COMMENTS
Metastatic cancer of unknown primary with pathological fractures. Patient complaining of pain, d/w palliative attending, adjustments to pain medication made, c/w bowel regimen (titrate as needed to prevent constipation). S/p IR biopsy today, f/u results. Further treatment to be determined based on pathology results. MRI pending. Replete phos. Hep C therapy deferred.

## 2018-02-08 NOTE — PROGRESS NOTE ADULT - ASSESSMENT
46 y/o M with hx of HCV (Tx Naive), CLBP, Hx of IVDU, Liver Lesion s/p bx (at [facility]) was transferred for treatment of pathologic fractures of the ilium, acetabulum and sacrum.    Impression:  1) Cirrhosis - suspect due to HCV. MELD-Na Score of 8. OSH CT imaging suggestive of portal hypertension. Compensated liver function.  2) HCV - treatment naive  3) Liver lesion - indeterminate on Imaging.  4) Metastases - s/p pelvic mass bx at OSH, with findings suggestive pancreaticobiliary or lung primary (however other sites cannot be excluded). CA 19-9 is elevated. MRI was indeterminate at OSH. Unclear primary malignancy  5) Pathologic Fractures    Plan:  - F/u IR bx   - Monitor liver enzymes  - Limit hepatotoxic agents  - Would defer HCV treatment at this time given malignancy workup in progress

## 2018-02-08 NOTE — PROGRESS NOTE ADULT - SUBJECTIVE AND OBJECTIVE BOX
Reports of the patient ? arguing with the staff overnight  He complains of pain when being moved  He reports pain and discomfort in the pelvis, no evolution  Comparable intensity  LAst BM two days ago      T(C): 36.7 (02-08-18 @ 06:42), Max: 37 (02-07-18 @ 22:26)  HR: 65 (02-08-18 @ 06:42) (65 - 76)  BP: 114/81 (02-08-18 @ 06:42) (112/75 - 123/84)  RR: 18 (02-08-18 @ 06:42) (18 - 18)  SpO2: 99% (02-08-18 @ 06:42) (97% - 99%)  Wt(kg): --        CAPILLARY BLOOD GLUCOSE            calcium carbonate 1250 mG + Vitamin D (OsCal 500 + D) 1 Tablet(s) Oral daily  HYDROmorphone  Injectable 4 milliGRAM(s) IV Push every 3 hours PRN  methadone    Tablet 5 milliGRAM(s) Oral <User Schedule>  methadone    Tablet 2.5 milliGRAM(s) Oral <User Schedule>  naloxone Injectable 0.1 milliGRAM(s) IV Push every 3 minutes PRN  polyethylene glycol 3350 17 Gram(s) Oral daily PRN  pregabalin 75 milliGRAM(s) Oral at bedtime  propranolol 10 milliGRAM(s) Oral every 12 hours          02-08    138  |  103  |  21  ----------------------------<  91  3.9   |  25  |  0.61    Ca    8.4      08 Feb 2018 06:40  Phos  2.3     02-08  Mg     1.9     02-08    TPro  8.3  /  Alb  2.9<L>  /  TBili  0.4  /  DBili  x   /  AST  106<H>  /  ALT  88<H>  /  AlkPhos  113  02-08      Procalc  BNP  ABG                          10.5   7.93  )-----------( 125      ( 08 Feb 2018 06:40 )             32.5   PT/INR - ( 08 Feb 2018 06:40 )   PT: 12.8 SEC;   INR: 1.11          PTT - ( 08 Feb 2018 06:40 )  PTT:29.1 SEC        blood and urine cultures                  PERTINENT PMH REVIEWED:  [x ] YES [ ] NO           SOCIAL HISTORY:  Significant other/partner:  [x ] YES  [ ] NO            Children:  [x ] YES  [ ] NO                   Anabaptism/Spirituality: TBD  Substance hx:  [x ] YES   [ ] NO           Tobacco hx:  [ x] YES  [ ] NO             Alcohol hx: [x ] YES  [ ] NO        Home Opioid hx:  [x ] YES  [ ] NO   Living Situation: [ ] Home  [ ] Long term care  [ ] Rehab Shelter    REFERRALS:   [ ] Chaplaincy  [ ] Hospice  [ ] Child Life  [ ] Social Work  [ ] Case management [ ] Holistic Therapy     FAMILY HISTORY:  No pertinent family history in first degree relatives    [ x] Family history non contributory     BASELINE ADLs (prior to admission):  Independent [x ] moderately [ ] fully   Dependent   [ ] moderately [ ] fully    ADVANCE DIRECTIVES:  [ ] YES [x ] NO   DNR [ ] YES [x ] NO                      MOLST  [ ] YES [ x] NO    Living Will  [ ] YES [x ] NO    Health Care Proxy [ ] YES  [x ] NO      [x ] Surrogate  [ ] HCP  [ ] Guardian:     Unbefriended, has adult children but no   idea about their whereabouts                                                        Phone#:    Allergies    No Known Allergies    Intolerances               PRESENT SYMPTOMS:  Source: [x ] Patient   [ ] Family   [ ] Team     Pain: [x ] YES [ ] NO  OLDCARTS:     Dyspnea: [x ] YES [ ] NO On exertion  Anxiety: [x ] YES [ ] NO Minimal  Fatigue: [x ] YES [ ] NO   Nausea: [ ] YES [ x] NO  Loss of appetite: [ ] YES [x ] NO   Constipation: [ ] YES [x ] NO     Other Symptoms:  [ x] All other review of systems negative   [ ] Unable to obtain due to poor mentation     Karnofsky Performance Score/Palliative Performance Status Version 2:     30     % due to Fx  Protein Calorie Malutrition:  [ ] Mild   [ ] Moderate   [ ] Severe       Physical Exam:    General: [ x] Alert,  A&O x     [ ] lethargic   [ ] Agitated   [ ] Cachexia   HEENT: [ ] Normal   [ ] Dry mouth   [ ] ET Tube    [ ] Trach   Lungs: [ x] Clear [ ] Rhonchi  [ ] Crackles [ ] Wheezing [ ] Tachypnea  [ ] Audible excessive secretions   Cardiovascular:  [ ] Regular rate and rhythm  [ ] Irregular [ x] Tachycardia   [ ] Bradycardia   Abdomen: [ x] Soft  [ ] Distended  [ ]  [ ] +BS  [ ] Non tender [ x] Tender  [ ]PEG   [ ] NGT   Last BM:     Genitourinary: [x ] Normal [ ] Incontinent   [ ] Oliguria/Anuria   [ ] [place]  Musculoskeletal:  [ ] Normal   [ ] Generalized weakness  [ x] Bedbound   Neurological: [x ] No focal deficits  [ ] Cognitive impairment     Skin: [x ] Normal   [ ] Pressure ulcers     LABS:                           I&O's Summary      RADIOLOGY & ADDITIONAL STUDIES:

## 2018-02-08 NOTE — PROGRESS NOTE ADULT - SUBJECTIVE AND OBJECTIVE BOX
CHIEF COMPLAINT: hip pain    Interval Events: Patient states he had an episode of severe pain last night and did not receive his pain medications in a timely matter. He states that his current methadone dose is insufficient to control his  hip pain. He denies any fever, chills, SOB,CP, N/V  but endorses constipation.     OBJECTIVE:  Vital Signs Last 24 Hrs  T(C): 36.7 (08 Feb 2018 06:42), Max: 37 (07 Feb 2018 22:26)  T(F): 98.1 (08 Feb 2018 06:42), Max: 98.6 (07 Feb 2018 22:26)  HR: 65 (08 Feb 2018 06:42) (65 - 76)  BP: 114/81 (08 Feb 2018 06:42) (112/75 - 122/88)  BP(mean): --  ABP: --  ABP(mean): --  RR: 18 (08 Feb 2018 06:42) (18 - 18)  SpO2: 99% (08 Feb 2018 06:42) (97% - 99%)    CAPILLARY BLOOD GLUCOSE    PHYSICAL EXAM:  Gen: NAD, A&O x 3, lying in bed conversant  HEENT: PERRL, EOMI, mucous membranes moist, no oropharyngeal exudates  Neck:  no cervical lymphadenopathy  Pulmonary: CTAB, no rhonci, wheezes or rales  Cardiac: regular rate and rhythm, normal S1S2, no r/m/g  GI:  soft, nontender, nondistended  Extremities: warm, well perfused, no peripheral edema  MSK: LLE ROM limited due to pain, mildly tender to palpation  Skin: skin intact, no skin tears or rashes or other lesions      LINES:    HOSPITAL MEDICATIONS:      propranolol 10 milliGRAM(s) Oral every 12 hours        HYDROmorphone  Injectable 4 milliGRAM(s) IV Push every 3 hours PRN  HYDROmorphone  Injectable 3 milliGRAM(s) IV Push once PRN  methadone    Tablet 5 milliGRAM(s) Oral <User Schedule>  methadone    Tablet 2.5 milliGRAM(s) Oral <User Schedule>  pregabalin 75 milliGRAM(s) Oral at bedtime    polyethylene glycol 3350 17 Gram(s) Oral daily PRN        calcium carbonate 1250 mG + Vitamin D (OsCal 500 + D) 1 Tablet(s) Oral daily        naloxone Injectable 0.1 milliGRAM(s) IV Push every 3 minutes PRN      LABS:                        10.5   7.93  )-----------( 125      ( 08 Feb 2018 06:40 )             32.5     Hgb Trend: 10.5<--, 10.6<--, 11.5<--, 10.9<--, 10.1<--  02-08    138  |  103  |  21  ----------------------------<  91  3.9   |  25  |  0.61    Ca    8.4      08 Feb 2018 06:40  Phos  2.3     02-08  Mg     1.9     02-08    TPro  8.3  /  Alb  2.9<L>  /  TBili  0.4  /  DBili  x   /  AST  106<H>  /  ALT  88<H>  /  AlkPhos  113  02-08    Creatinine Trend: 0.61<--, 0.58<--, 0.67<--, 0.64<--, 0.55<--, 0.60<--  PT/INR - ( 08 Feb 2018 06:40 )   PT: 12.8 SEC;   INR: 1.11          PTT - ( 08 Feb 2018 06:40 )  PTT:29.1 SEC CHIEF COMPLAINT: hip pain    Interval Events: Patient states he had an episode of severe pain last night and did not receive his pain medications in a timely matter. He states that his current methadone dose is insufficient to control his  hip pain. He denies any fever, chills, SOB,CP, N/V  but endorses constipation.     OBJECTIVE:  Vital Signs Last 24 Hrs  T(C): 36.7 (08 Feb 2018 06:42), Max: 37 (07 Feb 2018 22:26)  T(F): 98.1 (08 Feb 2018 06:42), Max: 98.6 (07 Feb 2018 22:26)  HR: 65 (08 Feb 2018 06:42) (65 - 76)  BP: 114/81 (08 Feb 2018 06:42) (112/75 - 122/88)  BP(mean): --  ABP: --  ABP(mean): --  RR: 18 (08 Feb 2018 06:42) (18 - 18)  SpO2: 99% (08 Feb 2018 06:42) (97% - 99%)    CAPILLARY BLOOD GLUCOSE    PHYSICAL EXAM:  Gen: NAD, A&O x 3, lying in bed conversant  HEENT: PERRL, EOMI, mucous membranes moist, no oropharyngeal exudates  Neck:  no cervical lymphadenopathy  Pulmonary: CTAB, no rhonci, wheezes or rales  Cardiac: regular rate and rhythm, normal S1S2, no r/m/g  GI:  soft, nontender, nondistended  Extremities: warm, well perfused, no peripheral edema  MSK: LLE ROM limited due to pain, mildly tender to palpation  Skin: skin intact, no skin tears or rashes or other lesions      LINES:    HOSPITAL MEDICATIONS:      propranolol 10 milliGRAM(s) Oral every 12 hours        HYDROmorphone  Injectable 4 milliGRAM(s) IV Push every 3 hours PRN  HYDROmorphone  Injectable 3 milliGRAM(s) IV Push once PRN  methadone    Tablet 5 milliGRAM(s) Oral <User Schedule>  methadone    Tablet 2.5 milliGRAM(s) Oral <User Schedule>  pregabalin 75 milliGRAM(s) Oral at bedtime    polyethylene glycol 3350 17 Gram(s) Oral daily PRN        calcium carbonate 1250 mG + Vitamin D (OsCal 500 + D) 1 Tablet(s) Oral daily        naloxone Injectable 0.1 milliGRAM(s) IV Push every 3 minutes PRN      LABS:                        10.5   7.93  )-----------( 125      ( 08 Feb 2018 06:40 )             32.5     Hgb Trend: 10.5<--, 10.6<--, 11.5<--, 10.9<--, 10.1<--  02-08    138  |  103  |  21  ----------------------------<  91  3.9   |  25  |  0.61    Ca    8.4      08 Feb 2018 06:40  Phos  2.3     02-08  Mg     1.9     02-08    TPro  8.3  /  Alb  2.9<L>  /  TBili  0.4  /  DBili  x   /  AST  106<H>  /  ALT  88<H>  /  AlkPhos  113  02-08    Creatinine Trend: 0.61<--, 0.58<--, 0.67<--, 0.64<--, 0.55<--, 0.60<--  PT/INR - ( 08 Feb 2018 06:40 )   PT: 12.8 SEC;   INR: 1.11          PTT - ( 08 Feb 2018 06:40 )  PTT:29.1 SEC      Consultants d/w: Dr. Tobin (palliative)

## 2018-02-08 NOTE — PROGRESS NOTE ADULT - PROBLEM SELECTOR PLAN 1
Suboptimal control of pain today, exacerbated by shifting and repositioning off of the floor     Given his IVDU history, he may benefit from methadone as a neoplasm pain agent   Begin methadone 2.5 mg/5mg/5mg    Pain relief post prn 10/10->8/10 for  a couple of hours    Next titration of methadone on Monday  Begin IV dilaudid 4mg q3H prn  This may be the floor maximum for IVP    Monitor for sedation, bradypnea, confusion, or lethargy    QTc <450, maintain K>4, Mg >2    Naloxone prn    In the event of unsuccessful naloxone IV, consider naloxone infusion given the half life of methadone.

## 2018-02-08 NOTE — PROGRESS NOTE ADULT - PROBLEM SELECTOR PLAN 2
He disclosed his hx of IVDU, and recent sobriety (a little over 1 month)  He is fearful of stigmatization and feels burdened by the belief that his actions  directly/indirectly have lead to worsened physical state (i.e. potential cancer)    Chaplaincy/SW follow up

## 2018-02-08 NOTE — PROGRESS NOTE ADULT - PROBLEM SELECTOR PLAN 6
Challenges last night  He reports a lack of a calm environment as contributing to this  Due to the general stress of his current medical condition  I would not recommend benzodiazepines given his methadone use which may put him at risk for significant respiratory depression  Consider non pharmacologic sleep hygiene techniques.

## 2018-02-08 NOTE — PROGRESS NOTE ADULT - PROBLEM SELECTOR PLAN 1
left ilium, right acetabulum, and left sacrum in setting of newly diagnosed malignancy. CT findings c/w metastatic dz - primary to be determined  - outside path suggestive of pancreaticobiliary or lung primary (though necrotic tissue with minute focus of malignant tumor),   - s/p biopsy today  - will continue methadone 2.5/5/5mg and increase dilaudid from 3 to 4mg q4h PRN for severe pain pain  - start bowel regimen with miralax  - f/u MR femur and MRI pelvis as per ortho recs  - appreciate ortho recs, steroid (decadron) as per ortho  - appreciate rad/onc input - potential candidate for radiation therapy, to be coordinated

## 2018-02-08 NOTE — PROGRESS NOTE ADULT - PROBLEM SELECTOR PLAN 3
stage IV. Liver is likely the primary source as per imaging, though outside path suggesting pancreaticobiliary vs lung primary  - thrombocytopenia and anemia also likely 2/2 malignancy  -appreciate Oncology recs; OSH CT imaging uploaded

## 2018-02-08 NOTE — PROGRESS NOTE ADULT - SUBJECTIVE AND OBJECTIVE BOX
SUBJECTIVE:  - Pt to get IR guided bx today  - He is on Methadone for pain control  - No abdominal pain, nausea or vomiting    OBJECTIVE:    Vital Signs Last 24 Hrs  T(C): 36.7 (08 Feb 2018 06:42), Max: 37 (07 Feb 2018 22:26)  T(F): 98.1 (08 Feb 2018 06:42), Max: 98.6 (07 Feb 2018 22:26)  HR: 65 (08 Feb 2018 06:42) (65 - 76)  BP: 114/81 (08 Feb 2018 06:42) (112/75 - 123/84)  BP(mean): --  RR: 18 (08 Feb 2018 06:42) (18 - 18)  SpO2: 99% (08 Feb 2018 06:42) (97% - 99%)    PHYSICAL EXAM:  Constitutional: no acute distress  Eyes: no icterus  Neck: no masses, no LAD  Respiratory: normal inspiratory effort; no wheezing or crackles  Cardiovascular: RRR, normal S1/S2, no murmurs/rubs/gallops  Gastrointestinal: soft, nondistended, nontender, +BS  Extremities: no LE edema  Neurological: AAOx3, no asterixis  Skin: no rashes, bruises, petechiae    LABS:                        10.5   7.93  )-----------( 125      ( 08 Feb 2018 06:40 )             32.5     138  |  103  |  21  ----------------------------<  91  3.9   |  25  |  0.61    Ca    8.4      08 Feb 2018 06:40  Phos  2.3     02-08  Mg     1.9     02-08    TPro  8.3  /  Alb  2.9<L>  /  TBili  0.4  /  DBili  x   /  AST  106<H>  /  ALT  88<H>  /  AlkPhos  113  02-08    PT/INR - ( 08 Feb 2018 06:40 )   PT: 12.8 SEC;   INR: 1.11     PTT - ( 08 Feb 2018 06:40 )  PTT:29.1 SEC  LIVER FUNCTIONS - ( 08 Feb 2018 06:40 )  Alb: 2.9 g/dL / Pro: 8.3 g/dL / ALK PHOS: 113 u/L / ALT: 88 u/L / AST: 106 u/L / GGT: x

## 2018-02-08 NOTE — PROGRESS NOTE ADULT - PROBLEM SELECTOR PLAN 5
Immunohistochemistry findings suggest pancreaticobiliary or lung primary but a repeat biopsy is planned to confirm pathology and determine DMT candidacy.

## 2018-02-08 NOTE — PROGRESS NOTE ADULT - PROBLEM SELECTOR PLAN 4
Reports of outside hospital imaging documenting cirrhosis  No signs of acutely decompensated cirrhosis  Judicious dosing of opiates.

## 2018-02-08 NOTE — CHART NOTE - NSCHARTNOTEFT_GEN_A_CORE
PRE-INTERVENTIONAL RADIOLOGY PROCEDURE NOTE      Patient Age:  47    Patient Gender: M     Procedure: IR guided biopsy     Diagnosis/Indication: metastatic disease of unknown primary    Interventional Radiology Attending Physician: Dr. Olivares    Ordering Attending Physician: Dr. Lacey    Pertinent Medical History:   This patient is a 47yoM with PMH of drug abuse, hepatitis C (untreated), who was transferred from an outside hospital to continue evaluation of pathologic fractures of the L ilium, R acetabulum and L sacrum and get evaluated for potential intervention by orthopedic surgery. He had a previous biopsy that was nondiagnostic (suggestive of pancreaticobiliary or lung primary, with necrotic tissue) and requires a repeat biopsy for diagnostic purposes.     Pertinent labs:                      10.6   10.51 )-----------( 144      ( 07 Feb 2018 06:45 )             32.1       02-07    136  |  100  |  22  ----------------------------<  107<H>  4.3   |  23  |  0.58    Ca    8.3<L>      07 Feb 2018 06:45  Phos  3.4     02-07  Mg     2.3     02-07    TPro  8.8<H>  /  Alb  3.2<L>  /  TBili  0.5  /  DBili  x   /  AST  118<H>  /  ALT  94<H>  /  AlkPhos  129<H>  02-07      PT/INR - ( 08 Feb 2018 06:40 )   PT: 12.8 SEC;   INR: 1.11          PTT - ( 08 Feb 2018 06:40 )  PTT:29.1 SEC        Patient and Family Aware ? Yes    AM labs PENDING    Harleen Logan MD, pager [phone]

## 2018-02-08 NOTE — PROGRESS NOTE ADULT - ASSESSMENT
48 yo Male with hx of drug abuse, hepatitis C, chronic back pain, transferred from outside hospital for pathologic fractures of left ilium, right acetabulum, and left sacrum, undergoing further malignancy workup s/p IR guided biopsy today and pending MRI imaging of LLE in anticipation of potential RT or orthopedic intervention.

## 2018-02-09 DIAGNOSIS — E43 UNSPECIFIED SEVERE PROTEIN-CALORIE MALNUTRITION: ICD-10-CM

## 2018-02-09 PROCEDURE — 99233 SBSQ HOSP IP/OBS HIGH 50: CPT

## 2018-02-09 PROCEDURE — 99233 SBSQ HOSP IP/OBS HIGH 50: CPT | Mod: GC

## 2018-02-09 PROCEDURE — 99232 SBSQ HOSP IP/OBS MODERATE 35: CPT | Mod: GC

## 2018-02-09 RX ORDER — HYDROMORPHONE HYDROCHLORIDE 2 MG/ML
6 INJECTION INTRAMUSCULAR; INTRAVENOUS; SUBCUTANEOUS
Qty: 0 | Refills: 0 | Status: DISCONTINUED | OUTPATIENT
Start: 2018-02-09 | End: 2018-02-14

## 2018-02-09 RX ORDER — HYDROMORPHONE HYDROCHLORIDE 2 MG/ML
2 INJECTION INTRAMUSCULAR; INTRAVENOUS; SUBCUTANEOUS ONCE
Qty: 0 | Refills: 0 | Status: DISCONTINUED | OUTPATIENT
Start: 2018-02-09 | End: 2018-02-09

## 2018-02-09 RX ORDER — HYDROMORPHONE HYDROCHLORIDE 2 MG/ML
4 INJECTION INTRAMUSCULAR; INTRAVENOUS; SUBCUTANEOUS
Qty: 0 | Refills: 0 | Status: DISCONTINUED | OUTPATIENT
Start: 2018-02-09 | End: 2018-02-16

## 2018-02-09 RX ORDER — SODIUM,POTASSIUM PHOSPHATES 278-250MG
1 POWDER IN PACKET (EA) ORAL
Qty: 0 | Refills: 0 | Status: COMPLETED | OUTPATIENT
Start: 2018-02-09 | End: 2018-02-10

## 2018-02-09 RX ORDER — DOCUSATE SODIUM 100 MG
100 CAPSULE ORAL DAILY
Qty: 0 | Refills: 0 | Status: DISCONTINUED | OUTPATIENT
Start: 2018-02-09 | End: 2018-02-16

## 2018-02-09 RX ORDER — SENNA PLUS 8.6 MG/1
2 TABLET ORAL AT BEDTIME
Qty: 0 | Refills: 0 | Status: DISCONTINUED | OUTPATIENT
Start: 2018-02-09 | End: 2018-02-16

## 2018-02-09 RX ORDER — HYDROMORPHONE HYDROCHLORIDE 2 MG/ML
6 INJECTION INTRAMUSCULAR; INTRAVENOUS; SUBCUTANEOUS
Qty: 0 | Refills: 0 | Status: DISCONTINUED | OUTPATIENT
Start: 2018-02-09 | End: 2018-02-09

## 2018-02-09 RX ORDER — DEXAMETHASONE 0.5 MG/5ML
2 ELIXIR ORAL ONCE
Qty: 0 | Refills: 0 | Status: COMPLETED | OUTPATIENT
Start: 2018-02-09 | End: 2018-02-09

## 2018-02-09 RX ADMIN — HYDROMORPHONE HYDROCHLORIDE 4 MILLIGRAM(S): 2 INJECTION INTRAMUSCULAR; INTRAVENOUS; SUBCUTANEOUS at 20:28

## 2018-02-09 RX ADMIN — HYDROMORPHONE HYDROCHLORIDE 4 MILLIGRAM(S): 2 INJECTION INTRAMUSCULAR; INTRAVENOUS; SUBCUTANEOUS at 20:43

## 2018-02-09 RX ADMIN — Medication 75 MILLIGRAM(S): at 18:20

## 2018-02-09 RX ADMIN — HYDROMORPHONE HYDROCHLORIDE 4 MILLIGRAM(S): 2 INJECTION INTRAMUSCULAR; INTRAVENOUS; SUBCUTANEOUS at 07:32

## 2018-02-09 RX ADMIN — HYDROMORPHONE HYDROCHLORIDE 6 MILLIGRAM(S): 2 INJECTION INTRAMUSCULAR; INTRAVENOUS; SUBCUTANEOUS at 12:14

## 2018-02-09 RX ADMIN — METHADONE HYDROCHLORIDE 5 MILLIGRAM(S): 40 TABLET ORAL at 22:02

## 2018-02-09 RX ADMIN — Medication 1 TABLET(S): at 22:02

## 2018-02-09 RX ADMIN — HYDROMORPHONE HYDROCHLORIDE 4 MILLIGRAM(S): 2 INJECTION INTRAMUSCULAR; INTRAVENOUS; SUBCUTANEOUS at 04:49

## 2018-02-09 RX ADMIN — HYDROMORPHONE HYDROCHLORIDE 4 MILLIGRAM(S): 2 INJECTION INTRAMUSCULAR; INTRAVENOUS; SUBCUTANEOUS at 15:01

## 2018-02-09 RX ADMIN — HYDROMORPHONE HYDROCHLORIDE 4 MILLIGRAM(S): 2 INJECTION INTRAMUSCULAR; INTRAVENOUS; SUBCUTANEOUS at 02:30

## 2018-02-09 RX ADMIN — Medication 3 MILLIGRAM(S): at 22:02

## 2018-02-09 RX ADMIN — Medication 2 MILLIGRAM(S): at 08:14

## 2018-02-09 RX ADMIN — HYDROMORPHONE HYDROCHLORIDE 2 MILLIGRAM(S): 2 INJECTION INTRAMUSCULAR; INTRAVENOUS; SUBCUTANEOUS at 16:28

## 2018-02-09 RX ADMIN — Medication 1 TABLET(S): at 08:14

## 2018-02-09 RX ADMIN — METHADONE HYDROCHLORIDE 5 MILLIGRAM(S): 40 TABLET ORAL at 13:25

## 2018-02-09 RX ADMIN — Medication 1 TABLET(S): at 18:13

## 2018-02-09 RX ADMIN — Medication 10 MILLIGRAM(S): at 18:12

## 2018-02-09 RX ADMIN — HYDROMORPHONE HYDROCHLORIDE 202.4 MILLIGRAM(S): 2 INJECTION INTRAMUSCULAR; INTRAVENOUS; SUBCUTANEOUS at 11:59

## 2018-02-09 RX ADMIN — SENNA PLUS 2 TABLET(S): 8.6 TABLET ORAL at 22:02

## 2018-02-09 RX ADMIN — HYDROMORPHONE HYDROCHLORIDE 4 MILLIGRAM(S): 2 INJECTION INTRAMUSCULAR; INTRAVENOUS; SUBCUTANEOUS at 05:04

## 2018-02-09 RX ADMIN — POLYETHYLENE GLYCOL 3350 17 GRAM(S): 17 POWDER, FOR SOLUTION ORAL at 22:02

## 2018-02-09 RX ADMIN — HYDROMORPHONE HYDROCHLORIDE 4 MILLIGRAM(S): 2 INJECTION INTRAMUSCULAR; INTRAVENOUS; SUBCUTANEOUS at 02:15

## 2018-02-09 RX ADMIN — HYDROMORPHONE HYDROCHLORIDE 4 MILLIGRAM(S): 2 INJECTION INTRAMUSCULAR; INTRAVENOUS; SUBCUTANEOUS at 15:15

## 2018-02-09 RX ADMIN — HYDROMORPHONE HYDROCHLORIDE 2 MILLIGRAM(S): 2 INJECTION INTRAMUSCULAR; INTRAVENOUS; SUBCUTANEOUS at 16:24

## 2018-02-09 RX ADMIN — Medication 10 MILLIGRAM(S): at 06:05

## 2018-02-09 RX ADMIN — HYDROMORPHONE HYDROCHLORIDE 4 MILLIGRAM(S): 2 INJECTION INTRAMUSCULAR; INTRAVENOUS; SUBCUTANEOUS at 09:35

## 2018-02-09 RX ADMIN — HYDROMORPHONE HYDROCHLORIDE 6 MILLIGRAM(S): 2 INJECTION INTRAMUSCULAR; INTRAVENOUS; SUBCUTANEOUS at 18:54

## 2018-02-09 RX ADMIN — HYDROMORPHONE HYDROCHLORIDE 4 MILLIGRAM(S): 2 INJECTION INTRAMUSCULAR; INTRAVENOUS; SUBCUTANEOUS at 09:21

## 2018-02-09 RX ADMIN — HYDROMORPHONE HYDROCHLORIDE 4 MILLIGRAM(S): 2 INJECTION INTRAMUSCULAR; INTRAVENOUS; SUBCUTANEOUS at 00:07

## 2018-02-09 RX ADMIN — HYDROMORPHONE HYDROCHLORIDE 202.4 MILLIGRAM(S): 2 INJECTION INTRAMUSCULAR; INTRAVENOUS; SUBCUTANEOUS at 17:55

## 2018-02-09 RX ADMIN — HYDROMORPHONE HYDROCHLORIDE 4 MILLIGRAM(S): 2 INJECTION INTRAMUSCULAR; INTRAVENOUS; SUBCUTANEOUS at 07:47

## 2018-02-09 RX ADMIN — METHADONE HYDROCHLORIDE 2.5 MILLIGRAM(S): 40 TABLET ORAL at 05:48

## 2018-02-09 NOTE — DIETITIAN INITIAL EVALUATION ADULT. - NS AS NUTRI INTERV MEALS SNACK
General/healthful diet/1) continue with regular diet.  2) due to varices, monitor for tolerance and offer low fiber or mechanical soft as needed, 3) encourage and monitor intake, 4) Monitor weights, labs, BM's, skin integrity, p.o. intake. 5) consider adding a MVI

## 2018-02-09 NOTE — DIETITIAN INITIAL EVALUATION ADULT. - PERTINENT LABORATORY DATA
H&H 10.5/32.5 (L), albumin 2.9 (L),  (H), ALT 88 (H), Phosphorus 2.3 (L), 2/6 ferritin 701.4 (H), lactate dehydrogenase 297 (H), Vitamin B12 992 (H), folate >20.0 (H)

## 2018-02-09 NOTE — PROGRESS NOTE ADULT - ATTENDING COMMENTS
Patient was seen and examined with GI fellow. Agree with above.  Patient awake, alert and oriented x3 but in pain from pathological fracture. He is s/p pelvic mass biopsy awaiting path report.  Will recommend  -trend liver tests  -continue pain management per primary team,   -await pelvic mass biopsy report  -defer HCV treatment

## 2018-02-09 NOTE — PROGRESS NOTE ADULT - PROBLEM SELECTOR PLAN 2
He disclosed his hx of IVDU, and recent sobriety (a little over 1 month)  He is fearful of stigmatization and feels burdened by the belief that his actions  directly/indirectly have lead to worsened physical state (i.e. potential cancer)    His behaviors may be psuedoaddiction, unclear, continue to     Chaplaincy/SW follow up

## 2018-02-09 NOTE — PROGRESS NOTE ADULT - ASSESSMENT
Impression:  1) Cirrhosis - suspect due to HCV. MELD-Na Score of 8. OSH CT imaging suggestive of portal hypertension. Compensated liver function.  2) HCV - treatment naive  3) Liver lesion - indeterminate on Imaging.  4) Metastases - s/p pelvic mass bx at OSH, with findings suggestive pancreaticobiliary or lung primary (however other sites cannot be excluded). CA 19-9 is elevated. MRI was indeterminate at OSH. Unclear primary malignancy  5) Pathologic Fractures    Plan:  - F/u IR bx   - Monitor liver enzymes  - Limit hepatotoxic agents  - Would defer HCV treatment at this time given malignancy workup in progress Impression:  1) Cirrhosis most likely chronic HCV. MELD-Na Score of 8. OSH CT imaging suggestive of portal hypertension. Compensated liver function.  2) HCV - treatment naive  3) Liver lesion - indeterminate on Imaging.  4) Metastases - s/p pelvic mass bx at OSH, with findings suggestive pancreaticobiliary or lung primary (however other sites cannot be excluded). CA 19-9 is elevated. MRI was indeterminate at OSH. Unclear primary malignancy  5) Pelvic mass with pathologic Fractures    Plan:  - F/u IR bx   - Monitor liver tests  - Limit hepatotoxic agents  - Would defer HCV treatment at this time given malignancy workup in progress

## 2018-02-09 NOTE — PROGRESS NOTE ADULT - SUBJECTIVE AND OBJECTIVE BOX
Pt seen and examined  Worsening pain since yesterday  Nine 4 mg dilaudid pushes in 24 hours= 36 mg IV dilaudid  Pt displaying anger with staff situationally  He is very frustrated              T(C): 37.7 (02-09-18 @ 06:04), Max: 37.7 (02-09-18 @ 06:04)  HR: 91 (02-09-18 @ 06:04) (75 - 91)  BP: 158/100 (02-09-18 @ 09:29) (99/52 - 158/100)  RR: 18 (02-09-18 @ 06:04) (18 - 18)  SpO2: 100% (02-09-18 @ 06:04) (97% - 100%)  Wt(kg): --        CAPILLARY BLOOD GLUCOSE            calcium carbonate 1250 mG + Vitamin D (OsCal 500 + D) 1 Tablet(s) Oral daily  docusate sodium 100 milliGRAM(s) Oral daily  HYDROmorphone  Injectable 3 milliGRAM(s) IV Push once PRN  HYDROmorphone  Injectable 4 milliGRAM(s) IV Push every 3 hours PRN  HYDROmorphone  IVPB 6 milliGRAM(s) IV Intermittent <User Schedule>  melatonin 3 milliGRAM(s) Oral at bedtime  methadone    Tablet 5 milliGRAM(s) Oral <User Schedule>  methadone    Tablet 2.5 milliGRAM(s) Oral <User Schedule>  naloxone Injectable 0.1 milliGRAM(s) IV Push every 3 minutes PRN  polyethylene glycol 3350 17 Gram(s) Oral daily PRN  potassium acid phosphate/sodium acid phosphate tablet (K-PHOS No. 2) 1 Tablet(s) Oral two times a day before meals  potassium acid phosphate/sodium acid phosphate tablet (K-PHOS No. 2) 1 Tablet(s) Oral four times a day with meals  pregabalin 75 milliGRAM(s) Oral at bedtime  propranolol 10 milliGRAM(s) Oral every 12 hours  senna 2 Tablet(s) Oral at bedtime          02-08    138  |  103  |  21  ----------------------------<  91  3.9   |  25  |  0.61    Ca    8.4      08 Feb 2018 06:40  Phos  2.3     02-08  Mg     1.9     02-08    TPro  8.3  /  Alb  2.9<L>  /  TBili  0.4  /  DBili  x   /  AST  106<H>  /  ALT  88<H>  /  AlkPhos  113  02-08      Procalc  BNP  ABG                          10.5   7.93  )-----------( 125      ( 08 Feb 2018 06:40 )             32.5   PT/INR - ( 08 Feb 2018 06:40 )   PT: 12.8 SEC;   INR: 1.11          PTT - ( 08 Feb 2018 06:40 )  PTT:29.1 SEC        blood and urine cultures        PERTINENT PMH REVIEWED:  [x ] YES [ ] NO           SOCIAL HISTORY:  Significant other/partner:  [x ] YES  [ ] NO            Children:  [x ] YES  [ ] NO                   Latter day/Spirituality: TBD  Substance hx:  [x ] YES   [ ] NO           Tobacco hx:  [ x] YES  [ ] NO             Alcohol hx: [x ] YES  [ ] NO        Home Opioid hx:  [x ] YES  [ ] NO   Living Situation: [ ] Home  [ ] Long term care  [ ] Rehab Shelter    REFERRALS:   [ ] Chaplaincy  [ ] Hospice  [ ] Child Life  [ ] Social Work  [ ] Case management [ ] Holistic Therapy     FAMILY HISTORY:  No pertinent family history in first degree relatives    [ x] Family history non contributory     BASELINE ADLs (prior to admission):  Independent [x ] moderately [ ] fully   Dependent   [ ] moderately [ ] fully    ADVANCE DIRECTIVES:  [ ] YES [x ] NO   DNR [ ] YES [x ] NO                      MOLST  [ ] YES [ x] NO    Living Will  [ ] YES [x ] NO    Health Care Proxy [ ] YES  [x ] NO      [x ] Surrogate  [ ] HCP  [ ] Guardian:     Unbefriended, has adult children but no   idea about their whereabouts                                                        Phone#:    Allergies    No Known Allergies    Intolerances               PRESENT SYMPTOMS:  Source: [x ] Patient   [ ] Family   [ ] Team     Pain: [x ] YES [ ] NO  OLDCARTS:     Dyspnea: [x ] YES [ ] NO On exertion  Anxiety: [x ] YES [ ] NO Minimal  Fatigue: [x ] YES [ ] NO   Nausea: [ ] YES [ x] NO  Loss of appetite: [ ] YES [x ] NO   Constipation: [ ] YES [x ] NO     Other Symptoms:  [ x] All other review of systems negative   [ ] Unable to obtain due to poor mentation     Karnofsky Performance Score/Palliative Performance Status Version 2:     30     % due to Fx  Protein Calorie Malutrition:  [ ] Mild   [ ] Moderate   [ ] Severe       Physical Exam:    General: [ x] Alert,  A&O x     [ ] lethargic   [ ] Agitated   [ ] Cachexia   HEENT: [ ] Normal   [ ] Dry mouth   [ ] ET Tube    [ ] Trach   Lungs: [ x] Clear [ ] Rhonchi  [ ] Crackles [ ] Wheezing [ ] Tachypnea  [ ] Audible excessive secretions   Cardiovascular:  [ ] Regular rate and rhythm  [ ] Irregular [ x] Tachycardia   [ ] Bradycardia   Abdomen: [ x] Soft  [ ] Distended  [ ]  [ ] +BS  [ ] Non tender [ x] Tender  [ ]PEG   [ ] NGT   Last BM:     Genitourinary: [x ] Normal [ ] Incontinent   [ ] Oliguria/Anuria   [ ] [place]  Musculoskeletal:  [ ] Normal   [ ] Generalized weakness  [ x] Bedbound   Neurological: [x ] No focal deficits  [ ] Cognitive impairment     Skin: [x ] Normal   [ ] Pressure ulcers     LABS:                           I&O's Summary      RADIOLOGY & ADDITIONAL STUDIES:

## 2018-02-09 NOTE — PROGRESS NOTE ADULT - ATTENDING COMMENTS
Patient seen and examined, d/w HS, agree with above.    Metastatic cancer of unknown primary with pathological fractures. Patient still reporting uncontrolled pain requiring frequent use of IV narcotics, d/w palliative attending, adjustments to pain medication made, appreciate assistance with management of metastatic cancer pain 2/2 pathological fractures. c/w bowel regimen, manage opioid induced constipation. f/u results of IR biopsy. Further treatment to be determined based on pathology results. d/w GI, possible pancreatic origin but they are still considering testicular cancer as possibility, await results biopsy. d/w ortho, awaiting MRI prior to decision on therapy. Severe protein calorie malnutrition, c/w supplementation.

## 2018-02-09 NOTE — DIETITIAN INITIAL EVALUATION ADULT. - OTHER INFO
Patient seen for length of stay.  Patient was not comfortable so interview was very short.  Patient reports lot intake PTA at <50% of meals, time frame was not received.  Patient is extremely thin in appearance with loss of both lean muscle and fat mass.  In the hospital, patient reports eating well and no current GI issues.  Patient has no GI preferences.  Patient is open to trying a supplement.  No known food allergiels.

## 2018-02-09 NOTE — CHART NOTE - NSCHARTNOTEFT_GEN_A_CORE
NUTRITION SERVICES     Upon Nutritional Assessment by the Registered Dietitian your patient was determined to meet criteria/ has evidence of the following diagnosis/diagnoses:  [ ] Mild Protein Calorie Malnutrition   [ ] Moderate Protein Calorie Malnutrition   [x] Severe Protein Calorie Malnutrition   [ ] Unspecified Protein Calorie Malnutrition   [ ] Underweight / BMI <19  [ ] Morbid Obesity / BMI >40    Findings as based on:  [ x] Comprehensive nutrition assessment   [ ] Nutrition Focused Physical Exam  [ ] Other:       Nutrition Plan/Recommendations:      Refer to Initial Dietitian Evaluation or Nutrition Follow-Up Documentation for Nutritional Recommendations.     PROVIDER Section:     By signing this assessment you are acknowledging and agree with the diagnosis/diagnoses assigned by the Registered Dietitian    Comments:

## 2018-02-09 NOTE — DIETITIAN INITIAL EVALUATION ADULT. - ENERGY NEEDS
Ht: 69", Wt: 139.9 pounds,  pounds, %IBW 87%, BMI 20.7  No edema noted, Jareth Score 18, skin intact.   Fluids per medical team.

## 2018-02-09 NOTE — PROGRESS NOTE ADULT - PROBLEM SELECTOR PLAN 1
Begin ATC methadone 2.5/5/5 q8H  Begin ATC IVPB dilaudid 6mg q8H interspersed  Maintain IV dilaudid 4mg q3H with stipulations relative to the timing of ATC medications  If pain Rx is needed early consider contacting palliative care to determine if doses could be done early  Four mg IV methadone is the largest IVP dose  Any dose beyond that must be IVPB/in NS

## 2018-02-09 NOTE — PROGRESS NOTE ADULT - PROBLEM SELECTOR PLAN 1
left ilium, right acetabulum, and left sacrum in setting of newly diagnosed malignancy. CT findings c/w metastatic dz - primary to be determined  - outside path suggestive of pancreaticobiliary or lung primary (though necrotic tissue with minute focus of malignant tumor),   - s/p biopsy via IR yesterday  - will continue methadone 2.5/5/5mg and w/ dilaudid 4mg q4h PRN for severe pain pain - will contact palliative team for further recommendations as pt still w/ severe pain  - c/w bowel regimen  - f/u MR femur and MRI pelvis as per ortho recs  - appreciate ortho recs, steroid (decadron) as per ortho (2mg today, last dose of 1mg tomorrow)  - appreciate rad/onc input - potential candidate for radiation therapy, to be coordinated

## 2018-02-09 NOTE — PROGRESS NOTE ADULT - ASSESSMENT
46 yo Male with hx of drug abuse, hepatitis C, chronic back pain, transferred from outside hospital for pathologic fractures of left ilium, right acetabulum, and left sacrum, undergoing further malignancy workup s/p IR guided biopsy today and pending MRI imaging of LLE in anticipation of potential RT or orthopedic intervention. 46 yo Male with hx of drug abuse, hepatitis C, chronic back pain, transferred from outside hospital for pathologic fractures of left ilium, right acetabulum, and left sacrum, undergoing further malignancy workup s/p IR guided biopsy and pending MRI imaging of LLE in anticipation of potential RT or orthopedic intervention.

## 2018-02-09 NOTE — DIETITIAN INITIAL EVALUATION ADULT. - PERTINENT MEDS FT
melatonin, methadone, calcium carbonate, docusate sodium, potassium acid phosphate/sodium acid phosphate tablet, polyethylene glycol 3350

## 2018-02-09 NOTE — PROGRESS NOTE ADULT - PROBLEM SELECTOR PLAN 3
stage IV. Liver is likely the primary source as per imaging, though outside path suggesting pancreaticobiliary vs lung primary  - thrombocytopenia and anemia also likely 2/2 malignancy  -appreciate Oncology recs; OSH CT imaging uploaded stage IV. Liver is likely the primary source as per imaging, though outside path suggesting pancreaticobiliary vs lung primary, with elevated ca 19-9 and CEA  - thrombocytopenia and anemia also likely 2/2 malignancy  -appreciate Oncology recs; OSH CT imaging uploaded

## 2018-02-09 NOTE — PROVIDER CONTACT NOTE (OTHER) - SITUATION
Patient complaining of severe 10/10 bilateral hip pain. Patient last received dilaudid 4mg IVP PRN @ 4:49am, not due for next dose until 7:49am.

## 2018-02-09 NOTE — PROGRESS NOTE ADULT - SUBJECTIVE AND OBJECTIVE BOX
HEPATOLOGY      Chief Complaint:  Patient is a 47y old  Male who presents with a chief complaint of hip pain (03 Feb 2018 12:32)      Interval Events: Patient having abdominal and leg pain asking for more pain meds.    Allergies:  No Known Allergies      Hospital Medications:  calcium carbonate 1250 mG + Vitamin D (OsCal 500 + D) 1 Tablet(s) Oral daily  HYDROmorphone  Injectable 4 milliGRAM(s) IV Push every 3 hours PRN  HYDROmorphone  Injectable 3 milliGRAM(s) IV Push once PRN  melatonin 3 milliGRAM(s) Oral at bedtime  methadone    Tablet 5 milliGRAM(s) Oral <User Schedule>  methadone    Tablet 2.5 milliGRAM(s) Oral <User Schedule>  naloxone Injectable 0.1 milliGRAM(s) IV Push every 3 minutes PRN  polyethylene glycol 3350 17 Gram(s) Oral daily PRN  potassium acid phosphate/sodium acid phosphate tablet (K-PHOS No. 2) 1 Tablet(s) Oral two times a day before meals  pregabalin 75 milliGRAM(s) Oral at bedtime  propranolol 10 milliGRAM(s) Oral every 12 hours      PMHX/PSHX:  IV drug abuse  Hepatitis C  No significant past surgical history      Family history:  No pertinent family history in first degree relatives      ROS:     General:  No fevers, chills  Eyes:  Good vision  ENT:  No odynophagia, dysphagia  CV:  No pain, palpitations  Resp:  No dyspnea, cough, tachypnea, wheezing  GI:  See HPI  Muscle:  No pain, weakness  Skin:  No rash, edema      PHYSICAL EXAM:     GENERAL:  No distress  HEENT: conjunctivae clear  CHEST:  Full & symmetric excursion, no increased effort  ABDOMEN:  Soft, non-tender, non-distended  EXTREMITIES:  no edema  SKIN:  Dry/warm  NEURO:  Alert    Vital Signs:  Vital Signs Last 24 Hrs  T(C): 37.7 (09 Feb 2018 06:04), Max: 37.7 (09 Feb 2018 06:04)  T(F): 99.9 (09 Feb 2018 06:04), Max: 99.9 (09 Feb 2018 06:04)  HR: 91 (09 Feb 2018 06:04) (75 - 91)  BP: 148/80 (09 Feb 2018 06:04) (99/52 - 150/105)  BP(mean): --  RR: 18 (09 Feb 2018 06:04) (18 - 18)  SpO2: 100% (09 Feb 2018 06:04) (97% - 100%)  Daily     Daily     LABS:                        10.5   7.93  )-----------( 125      ( 08 Feb 2018 06:40 )             32.5     02-08    138  |  103  |  21  ----------------------------<  91  3.9   |  25  |  0.61    Ca    8.4      08 Feb 2018 06:40  Phos  2.3     02-08  Mg     1.9     02-08    TPro  8.3  /  Alb  2.9<L>  /  TBili  0.4  /  DBili  x   /  AST  106<H>  /  ALT  88<H>  /  AlkPhos  113  02-08    LIVER FUNCTIONS - ( 08 Feb 2018 06:40 )  Alb: 2.9 g/dL / Pro: 8.3 g/dL / ALK PHOS: 113 u/L / ALT: 88 u/L / AST: 106 u/L / GGT: x           PT/INR - ( 08 Feb 2018 06:40 )   PT: 12.8 SEC;   INR: 1.11          PTT - ( 08 Feb 2018 06:40 )  PTT:29.1 SEC        Imaging:

## 2018-02-09 NOTE — PROGRESS NOTE ADULT - SUBJECTIVE AND OBJECTIVE BOX
Patient is a 47y old  Male who presents with a chief complaint of hip pain (03 Feb 2018 12:32)      SUBJECTIVE / OVERNIGHT EVENTS:  Pt requiring earlier than scheduled DIlaudid prn all throughout the night and this AM due to 10/10 pain that he said is similar in nature and location to pain he felt on admission but exacerbated since IR procedure yesterday. Pain is only alleviated for a brief amount of time (~1 hr) after each dilaudid 4mg IV. Denies CP, SOB, palps, fevers, chills, n/v/d/c.     MEDICATIONS  (STANDING):  calcium carbonate 1250 mG + Vitamin D (OsCal 500 + D) 1 Tablet(s) Oral daily  dexamethasone     Tablet 2 milliGRAM(s) Oral once  melatonin 3 milliGRAM(s) Oral at bedtime  methadone    Tablet 5 milliGRAM(s) Oral <User Schedule>  methadone    Tablet 2.5 milliGRAM(s) Oral <User Schedule>  potassium acid phosphate/sodium acid phosphate tablet (K-PHOS No. 2) 1 Tablet(s) Oral two times a day before meals  pregabalin 75 milliGRAM(s) Oral at bedtime  propranolol 10 milliGRAM(s) Oral every 12 hours    MEDICATIONS  (PRN):  HYDROmorphone  Injectable 4 milliGRAM(s) IV Push every 3 hours PRN Severe Pain (7 - 10)  HYDROmorphone  Injectable 3 milliGRAM(s) IV Push once PRN prior to MRI  naloxone Injectable 0.1 milliGRAM(s) IV Push every 3 minutes PRN Unarouseable or RR <11  polyethylene glycol 3350 17 Gram(s) Oral daily PRN Constipation        CAPILLARY BLOOD GLUCOSE        I&O's Summary      PHYSICAL EXAM:  VS: T 99.9, HR 91, /80, RR 18, SpO2 100  Gen: moderately distressed 2/2 pelvic pain  HEENT: PERRL, mucous membranes moist, no oropharyngeal exudates  Neck:  no cervical lymphadenopathy  Pulmonary: CTAB, no rhonci, wheezes or rales  Cardiac: tachycardic w/ normal rhythm, normal S1S2, no r/m/g  GI:  soft, nontender, nondistended  Extremities: warm, well perfused, no peripheral edema  MSK: LLE ROM limited due to pain  Skin: skin intact, no skin tears or rashes or other lesions    LABS:                        10.5   7.93  )-----------( 125      ( 08 Feb 2018 06:40 )             32.5     02-08    138  |  103  |  21  ----------------------------<  91  3.9   |  25  |  0.61    Ca    8.4      08 Feb 2018 06:40  Phos  2.3     02-08  Mg     1.9     02-08    TPro  8.3  /  Alb  2.9<L>  /  TBili  0.4  /  DBili  x   /  AST  106<H>  /  ALT  88<H>  /  AlkPhos  113  02-08    PT/INR - ( 08 Feb 2018 06:40 )   PT: 12.8 SEC;   INR: 1.11          PTT - ( 08 Feb 2018 06:40 )  PTT:29.1 SEC          RADIOLOGY & ADDITIONAL TESTS:    No new additional studies. Patient is a 47y old  Male who presents with a chief complaint of hip pain (03 Feb 2018 12:32)      SUBJECTIVE / OVERNIGHT EVENTS:  Pt requiring earlier than scheduled DIlaudid prn all throughout the night and this AM due to 10/10 pain that he said is similar in nature and location to pain he felt on admission but exacerbated since IR procedure yesterday. Pain is only alleviated for a brief amount of time (~1 hr) after each dilaudid 4mg IV. Denies CP, SOB, palps, fevers, chills, n/v/d/c.     MEDICATIONS  (STANDING):  calcium carbonate 1250 mG + Vitamin D (OsCal 500 + D) 1 Tablet(s) Oral daily  dexamethasone     Tablet 2 milliGRAM(s) Oral once  melatonin 3 milliGRAM(s) Oral at bedtime  methadone    Tablet 5 milliGRAM(s) Oral <User Schedule>  methadone    Tablet 2.5 milliGRAM(s) Oral <User Schedule>  potassium acid phosphate/sodium acid phosphate tablet (K-PHOS No. 2) 1 Tablet(s) Oral two times a day before meals  pregabalin 75 milliGRAM(s) Oral at bedtime  propranolol 10 milliGRAM(s) Oral every 12 hours    MEDICATIONS  (PRN):  HYDROmorphone  Injectable 4 milliGRAM(s) IV Push every 3 hours PRN Severe Pain (7 - 10)  HYDROmorphone  Injectable 3 milliGRAM(s) IV Push once PRN prior to MRI  naloxone Injectable 0.1 milliGRAM(s) IV Push every 3 minutes PRN Unarouseable or RR <11  polyethylene glycol 3350 17 Gram(s) Oral daily PRN Constipation        CAPILLARY BLOOD GLUCOSE        I&O's Summary      PHYSICAL EXAM:  VS: T 99.9, HR 91, /80, RR 18, SpO2 100  Gen: moderately distressed 2/2 pelvic pain  HEENT: PERRL, mucous membranes moist, no oropharyngeal exudates  Neck:  no cervical lymphadenopathy  Pulmonary: CTAB, no rhonci, wheezes or rales  Cardiac: tachycardic w/ normal rhythm, normal S1S2, no r/m/g  GI:  soft, nontender, nondistended  Extremities: warm, well perfused, no peripheral edema  MSK: LLE ROM limited due to pain  Skin: skin intact, no skin tears or rashes or other lesions    LABS:                        10.5   7.93  )-----------( 125      ( 08 Feb 2018 06:40 )             32.5     02-08    138  |  103  |  21  ----------------------------<  91  3.9   |  25  |  0.61    Ca    8.4      08 Feb 2018 06:40  Phos  2.3     02-08  Mg     1.9     02-08    TPro  8.3  /  Alb  2.9<L>  /  TBili  0.4  /  DBili  x   /  AST  106<H>  /  ALT  88<H>  /  AlkPhos  113  02-08    PT/INR - ( 08 Feb 2018 06:40 )   PT: 12.8 SEC;   INR: 1.11          PTT - ( 08 Feb 2018 06:40 )  PTT:29.1 SEC          RADIOLOGY & ADDITIONAL TESTS:    No new additional studies.     Consultants d/w: Ortho, GI (Dr. Sidhu), Palliative (Dr. Tobin)

## 2018-02-10 LAB
ALBUMIN SERPL ELPH-MCNC: 3.1 G/DL — LOW (ref 3.3–5)
ALP SERPL-CCNC: 144 U/L — HIGH (ref 40–120)
ALT FLD-CCNC: 108 U/L — HIGH (ref 4–41)
APTT BLD: 32.1 SEC — SIGNIFICANT CHANGE UP (ref 27.5–37.4)
AST SERPL-CCNC: 143 U/L — HIGH (ref 4–40)
BASOPHILS # BLD AUTO: 0.03 K/UL — SIGNIFICANT CHANGE UP (ref 0–0.2)
BASOPHILS NFR BLD AUTO: 0.2 % — SIGNIFICANT CHANGE UP (ref 0–2)
BILIRUB SERPL-MCNC: 0.8 MG/DL — SIGNIFICANT CHANGE UP (ref 0.2–1.2)
BUN SERPL-MCNC: 19 MG/DL — SIGNIFICANT CHANGE UP (ref 7–23)
CALCIUM SERPL-MCNC: 8.4 MG/DL — SIGNIFICANT CHANGE UP (ref 8.4–10.5)
CHLORIDE SERPL-SCNC: 95 MMOL/L — LOW (ref 98–107)
CO2 SERPL-SCNC: 25 MMOL/L — SIGNIFICANT CHANGE UP (ref 22–31)
CREAT SERPL-MCNC: 0.6 MG/DL — SIGNIFICANT CHANGE UP (ref 0.5–1.3)
EOSINOPHIL # BLD AUTO: 0.17 K/UL — SIGNIFICANT CHANGE UP (ref 0–0.5)
EOSINOPHIL NFR BLD AUTO: 1.3 % — SIGNIFICANT CHANGE UP (ref 0–6)
GLUCOSE SERPL-MCNC: 111 MG/DL — HIGH (ref 70–99)
HAV IGG+IGM SER QL: REACTIVE — SIGNIFICANT CHANGE UP
HCT VFR BLD CALC: 38.6 % — LOW (ref 39–50)
HCV AB S/CO SERPL IA: 7.28 S/CO — SIGNIFICANT CHANGE UP
HCV AB SERPL-IMP: REACTIVE — SIGNIFICANT CHANGE UP
HGB BLD-MCNC: 12.4 G/DL — LOW (ref 13–17)
IMM GRANULOCYTES # BLD AUTO: 0.13 # — SIGNIFICANT CHANGE UP
IMM GRANULOCYTES NFR BLD AUTO: 1 % — SIGNIFICANT CHANGE UP (ref 0–1.5)
INR BLD: 1.2 — HIGH (ref 0.88–1.17)
LYMPHOCYTES # BLD AUTO: 16.4 % — SIGNIFICANT CHANGE UP (ref 13–44)
LYMPHOCYTES # BLD AUTO: 2.08 K/UL — SIGNIFICANT CHANGE UP (ref 1–3.3)
MAGNESIUM SERPL-MCNC: 2 MG/DL — SIGNIFICANT CHANGE UP (ref 1.6–2.6)
MCHC RBC-ENTMCNC: 27.7 PG — SIGNIFICANT CHANGE UP (ref 27–34)
MCHC RBC-ENTMCNC: 32.1 % — SIGNIFICANT CHANGE UP (ref 32–36)
MCV RBC AUTO: 86.4 FL — SIGNIFICANT CHANGE UP (ref 80–100)
MONOCYTES # BLD AUTO: 1.28 K/UL — HIGH (ref 0–0.9)
MONOCYTES NFR BLD AUTO: 10.1 % — SIGNIFICANT CHANGE UP (ref 2–14)
NEUTROPHILS # BLD AUTO: 8.96 K/UL — HIGH (ref 1.8–7.4)
NEUTROPHILS NFR BLD AUTO: 71 % — SIGNIFICANT CHANGE UP (ref 43–77)
NRBC # FLD: 0 — SIGNIFICANT CHANGE UP
PHOSPHATE SERPL-MCNC: 3.1 MG/DL — SIGNIFICANT CHANGE UP (ref 2.5–4.5)
PLATELET # BLD AUTO: 141 K/UL — LOW (ref 150–400)
PMV BLD: 10.9 FL — SIGNIFICANT CHANGE UP (ref 7–13)
POTASSIUM SERPL-MCNC: 4 MMOL/L — SIGNIFICANT CHANGE UP (ref 3.5–5.3)
POTASSIUM SERPL-SCNC: 4 MMOL/L — SIGNIFICANT CHANGE UP (ref 3.5–5.3)
PROT SERPL-MCNC: 9 G/DL — HIGH (ref 6–8.3)
PROT UR-MCNC: 18.5 MG/DL — SIGNIFICANT CHANGE UP
PROTHROM AB SERPL-ACNC: 13.9 SEC — HIGH (ref 9.8–13.1)
RBC # BLD: 4.47 M/UL — SIGNIFICANT CHANGE UP (ref 4.2–5.8)
RBC # FLD: 15.7 % — HIGH (ref 10.3–14.5)
SODIUM SERPL-SCNC: 132 MMOL/L — LOW (ref 135–145)
WBC # BLD: 12.65 K/UL — HIGH (ref 3.8–10.5)
WBC # FLD AUTO: 12.65 K/UL — HIGH (ref 3.8–10.5)

## 2018-02-10 PROCEDURE — 84165 PROTEIN E-PHORESIS SERUM: CPT | Mod: 26

## 2018-02-10 PROCEDURE — 84166 PROTEIN E-PHORESIS/URINE/CSF: CPT | Mod: 26

## 2018-02-10 PROCEDURE — 99233 SBSQ HOSP IP/OBS HIGH 50: CPT | Mod: GC

## 2018-02-10 RX ADMIN — SENNA PLUS 2 TABLET(S): 8.6 TABLET ORAL at 21:50

## 2018-02-10 RX ADMIN — HYDROMORPHONE HYDROCHLORIDE 202.4 MILLIGRAM(S): 2 INJECTION INTRAMUSCULAR; INTRAVENOUS; SUBCUTANEOUS at 10:11

## 2018-02-10 RX ADMIN — METHADONE HYDROCHLORIDE 2.5 MILLIGRAM(S): 40 TABLET ORAL at 06:00

## 2018-02-10 RX ADMIN — HYDROMORPHONE HYDROCHLORIDE 4 MILLIGRAM(S): 2 INJECTION INTRAMUSCULAR; INTRAVENOUS; SUBCUTANEOUS at 00:28

## 2018-02-10 RX ADMIN — HYDROMORPHONE HYDROCHLORIDE 202.4 MILLIGRAM(S): 2 INJECTION INTRAMUSCULAR; INTRAVENOUS; SUBCUTANEOUS at 18:43

## 2018-02-10 RX ADMIN — METHADONE HYDROCHLORIDE 5 MILLIGRAM(S): 40 TABLET ORAL at 21:50

## 2018-02-10 RX ADMIN — HYDROMORPHONE HYDROCHLORIDE 6 MILLIGRAM(S): 2 INJECTION INTRAMUSCULAR; INTRAVENOUS; SUBCUTANEOUS at 10:11

## 2018-02-10 RX ADMIN — HYDROMORPHONE HYDROCHLORIDE 4 MILLIGRAM(S): 2 INJECTION INTRAMUSCULAR; INTRAVENOUS; SUBCUTANEOUS at 07:40

## 2018-02-10 RX ADMIN — HYDROMORPHONE HYDROCHLORIDE 4 MILLIGRAM(S): 2 INJECTION INTRAMUSCULAR; INTRAVENOUS; SUBCUTANEOUS at 23:30

## 2018-02-10 RX ADMIN — HYDROMORPHONE HYDROCHLORIDE 4 MILLIGRAM(S): 2 INJECTION INTRAMUSCULAR; INTRAVENOUS; SUBCUTANEOUS at 00:13

## 2018-02-10 RX ADMIN — Medication 1 TABLET(S): at 07:44

## 2018-02-10 RX ADMIN — HYDROMORPHONE HYDROCHLORIDE 4 MILLIGRAM(S): 2 INJECTION INTRAMUSCULAR; INTRAVENOUS; SUBCUTANEOUS at 23:45

## 2018-02-10 RX ADMIN — Medication 75 MILLIGRAM(S): at 18:43

## 2018-02-10 RX ADMIN — HYDROMORPHONE HYDROCHLORIDE 4 MILLIGRAM(S): 2 INJECTION INTRAMUSCULAR; INTRAVENOUS; SUBCUTANEOUS at 07:55

## 2018-02-10 RX ADMIN — HYDROMORPHONE HYDROCHLORIDE 4 MILLIGRAM(S): 2 INJECTION INTRAMUSCULAR; INTRAVENOUS; SUBCUTANEOUS at 11:42

## 2018-02-10 RX ADMIN — Medication 3 MILLIGRAM(S): at 23:31

## 2018-02-10 RX ADMIN — HYDROMORPHONE HYDROCHLORIDE 6 MILLIGRAM(S): 2 INJECTION INTRAMUSCULAR; INTRAVENOUS; SUBCUTANEOUS at 03:36

## 2018-02-10 RX ADMIN — HYDROMORPHONE HYDROCHLORIDE 6 MILLIGRAM(S): 2 INJECTION INTRAMUSCULAR; INTRAVENOUS; SUBCUTANEOUS at 18:53

## 2018-02-10 RX ADMIN — Medication 100 MILLIGRAM(S): at 11:43

## 2018-02-10 RX ADMIN — Medication 10 MILLIGRAM(S): at 18:43

## 2018-02-10 RX ADMIN — HYDROMORPHONE HYDROCHLORIDE 202.4 MILLIGRAM(S): 2 INJECTION INTRAMUSCULAR; INTRAVENOUS; SUBCUTANEOUS at 02:28

## 2018-02-10 RX ADMIN — HYDROMORPHONE HYDROCHLORIDE 4 MILLIGRAM(S): 2 INJECTION INTRAMUSCULAR; INTRAVENOUS; SUBCUTANEOUS at 11:57

## 2018-02-10 RX ADMIN — HYDROMORPHONE HYDROCHLORIDE 4 MILLIGRAM(S): 2 INJECTION INTRAMUSCULAR; INTRAVENOUS; SUBCUTANEOUS at 16:22

## 2018-02-10 RX ADMIN — Medication 1 TABLET(S): at 11:43

## 2018-02-10 RX ADMIN — HYDROMORPHONE HYDROCHLORIDE 4 MILLIGRAM(S): 2 INJECTION INTRAMUSCULAR; INTRAVENOUS; SUBCUTANEOUS at 16:07

## 2018-02-10 RX ADMIN — METHADONE HYDROCHLORIDE 5 MILLIGRAM(S): 40 TABLET ORAL at 13:43

## 2018-02-10 RX ADMIN — Medication 75 MILLIGRAM(S): at 06:00

## 2018-02-10 NOTE — PROGRESS NOTE ADULT - ATTENDING COMMENTS
Patient seen and examined, d/w HS, agree with above.     Metastatic cancer of unknown primary with pathological fractures. Pain bit better today at time of my visit. C/w pain management, appreciate palliative assistance with management of metastatic cancer pain 2/2 pathological fractures. c/w bowel regimen, manage opioid induced constipation. Awaiting results of IR biopsy. Further treatment to be determined based on pathology results, d/w onc. Patient doesn't think he will be able to tolerate MRI. Likely plan for palliative RT to pathological fractures once path results final. Patient seen and examined (2/10), d/w HS, agree with above.     Metastatic cancer of unknown primary with pathological fractures. Pain bit better today at time of my visit. C/w pain management, appreciate palliative assistance with management of metastatic cancer pain 2/2 pathological fractures. c/w bowel regimen, manage opioid induced constipation. Awaiting results of IR biopsy. Further treatment to be determined based on pathology results, d/w onc. Patient doesn't think he will be able to tolerate MRI. Likely plan for palliative RT to pathological fractures once path results final.

## 2018-02-10 NOTE — PROGRESS NOTE ADULT - PROBLEM SELECTOR PLAN 3
stage IV. Liver is likely the primary source as per imaging, though outside path suggesting pancreaticobiliary vs lung primary, with elevated ca 19-9 and CEA  - thrombocytopenia and anemia also likely 2/2 malignancy  -appreciate Oncology recs; OSH CT imaging uploaded stage IV. Liver is likely the primary source as per imaging, though outside path suggesting pancreaticobiliary vs lung primary, with elevated ca 19-9 and CEA  - thrombocytopenia and anemia also likely 2/2 malignancy  -appreciate Oncology recs; OSH CT imaging uploaded  - await results of IR biopsy

## 2018-02-10 NOTE — PROGRESS NOTE ADULT - PROBLEM SELECTOR PLAN 5
- repletion yesterday - will f/u w/ AM labs  - continue to monitor - repletion, phos improved today  - continue to monitor

## 2018-02-10 NOTE — PROGRESS NOTE ADULT - SUBJECTIVE AND OBJECTIVE BOX
Patient is a 47y old  Male who presents with a chief complaint of hip pain (03 Feb 2018 12:32)      SUBJECTIVE / OVERNIGHT EVENTS:  Pt states that pain is much more controlled compared to yesterday after start of new pain regimen. States that his pelvic pain is about 4/10 at this time. He states that he has not had a BM for the past 3 days but has been taking full bowel regimen. Encouraged him to also drink more water. Denies CP, SOB, fevers, chills, n/v/d/c.    MEDICATIONS  (STANDING):  calcium carbonate 1250 mG + Vitamin D (OsCal 500 + D) 1 Tablet(s) Oral daily  docusate sodium 100 milliGRAM(s) Oral daily  HYDROmorphone  IVPB 6 milliGRAM(s) IV Intermittent <User Schedule>  melatonin 3 milliGRAM(s) Oral at bedtime  methadone    Tablet 5 milliGRAM(s) Oral <User Schedule>  methadone    Tablet 2.5 milliGRAM(s) Oral <User Schedule>  pregabalin 75 milliGRAM(s) Oral two times a day  propranolol 10 milliGRAM(s) Oral every 12 hours  senna 2 Tablet(s) Oral at bedtime    MEDICATIONS  (PRN):  HYDROmorphone  Injectable 3 milliGRAM(s) IV Push once PRN prior to MRI  HYDROmorphone  Injectable 4 milliGRAM(s) IV Push every 3 hours PRN Severe Pain (7 - 10)  naloxone Injectable 0.1 milliGRAM(s) IV Push every 3 minutes PRN Unarouseable or RR <11  polyethylene glycol 3350 17 Gram(s) Oral daily PRN Constipation        CAPILLARY BLOOD GLUCOSE        I&O's Summary      PHYSICAL EXAM:  VS: T 98.3, HR 77, /76, RR 18, SpO2 98%  Gen: NAD this AM, resting comfortably in bed  HEENT: PERRL, mucous membranes moist, no oropharyngeal exudates  Neck:  no cervical lymphadenopathy  Pulmonary: CTAB, no rhonchi, wheezes or rales  Cardiac: RRR, normal S1S2, no r/m/g  GI:  soft, nontender, nondistended  Extremities: warm, well perfused, no peripheral edema  MSK: LLE ROM limited due to pain  Skin: skin intact, no skin tears or rashes or other lesions    LABS:                        12.4   12.65 )-----------( 141      ( 10 Feb 2018 06:15 )             38.6                     RADIOLOGY & ADDITIONAL TESTS:    No new additional studies. Patient is a 47y old  Male who presents with a chief complaint of hip pain (03 Feb 2018 12:32)      SUBJECTIVE / OVERNIGHT EVENTS:  Pt states that pain is much more controlled compared to yesterday after start of new pain regimen. States that his pelvic pain is about 4/10 at this time. He states that he has not had a BM for the past 3 days but has been taking full bowel regimen. Encouraged him to also drink more water. Denies CP, SOB, fevers, chills, n/v/d/c.    MEDICATIONS  (STANDING):  calcium carbonate 1250 mG + Vitamin D (OsCal 500 + D) 1 Tablet(s) Oral daily  docusate sodium 100 milliGRAM(s) Oral daily  HYDROmorphone  IVPB 6 milliGRAM(s) IV Intermittent <User Schedule>  melatonin 3 milliGRAM(s) Oral at bedtime  methadone    Tablet 5 milliGRAM(s) Oral <User Schedule>  methadone    Tablet 2.5 milliGRAM(s) Oral <User Schedule>  pregabalin 75 milliGRAM(s) Oral two times a day  propranolol 10 milliGRAM(s) Oral every 12 hours  senna 2 Tablet(s) Oral at bedtime    MEDICATIONS  (PRN):  HYDROmorphone  Injectable 3 milliGRAM(s) IV Push once PRN prior to MRI  HYDROmorphone  Injectable 4 milliGRAM(s) IV Push every 3 hours PRN Severe Pain (7 - 10)  naloxone Injectable 0.1 milliGRAM(s) IV Push every 3 minutes PRN Unarouseable or RR <11  polyethylene glycol 3350 17 Gram(s) Oral daily PRN Constipation        CAPILLARY BLOOD GLUCOSE        I&O's Summary      PHYSICAL EXAM:  VS: T 98.3, HR 77, /76, RR 18, SpO2 98%  Gen: NAD this AM, resting comfortably in bed  HEENT: PERRL, mucous membranes moist, no oropharyngeal exudates  Neck:  no cervical lymphadenopathy  Pulmonary: CTAB, no rhonchi, wheezes or rales  Cardiac: RRR, normal S1S2, no r/m/g  GI:  soft, nontender, nondistended  Extremities: warm, well perfused, no peripheral edema  MSK: LLE ROM limited due to pain  Skin: skin intact, no skin tears or rashes or other lesions    LABS:                        12.4   12.65 )-----------( 141      ( 10 Feb 2018 06:15 )             38.6     02-10    132<L>  |  95<L>  |  19  ----------------------------<  111<H>  4.0   |  25  |  0.60    Ca    8.4      10 Feb 2018 06:15  Phos  3.1     02-10  Mg     2.0     02-10    TPro  9.0<H>  /  Alb  3.1<L>  /  TBili  0.8  /  DBili  x   /  AST  143<H>  /  ALT  108<H>  /  AlkPhos  144<H>  02-10      RADIOLOGY & ADDITIONAL TESTS:    No new additional studies.     Consultants d/w: onc

## 2018-02-10 NOTE — PROGRESS NOTE ADULT - PROBLEM SELECTOR PLAN 1
left ilium, right acetabulum, and left sacrum in setting of newly diagnosed malignancy. CT findings c/w metastatic dz - primary to be determined  - outside path suggestive of pancreaticobiliary or lung primary (though necrotic tissue with minute focus of malignant tumor),   - s/p biopsy via IR on 2/8/18  - started dilaudid 6mg qd (02:00, 10:00, 18:00) yesterday and will continue methadone 2.5/5/5mg  w/ dilaudid 4mg q4h PRN for severe pain pain  - c/w bowel regimen  - f/u MR femur and MRI pelvis as per ortho recs  - appreciate ortho recs, steroid (decadron) as per ortho (2mg today, last dose of 1mg tomorrow)  - appreciate rad/onc input - potential candidate for radiation therapy, to be coordinated

## 2018-02-10 NOTE — PROGRESS NOTE ADULT - ASSESSMENT
48 yo Male with hx of drug abuse, hepatitis C, chronic back pain, transferred from outside hospital for pathologic fractures of left ilium, right acetabulum, and left sacrum, undergoing further malignancy workup s/p IR guided biopsy and pending MRI imaging of LLE in anticipation of potential RT or orthopedic intervention.

## 2018-02-11 LAB
ALBUMIN SERPL ELPH-MCNC: 2.7 G/DL — LOW (ref 3.3–5)
ALP SERPL-CCNC: 126 U/L — HIGH (ref 40–120)
ALT FLD-CCNC: 99 U/L — HIGH (ref 4–41)
AST SERPL-CCNC: 141 U/L — HIGH (ref 4–40)
BASOPHILS # BLD AUTO: 0.04 K/UL — SIGNIFICANT CHANGE UP (ref 0–0.2)
BASOPHILS NFR BLD AUTO: 0.3 % — SIGNIFICANT CHANGE UP (ref 0–2)
BILIRUB SERPL-MCNC: 0.9 MG/DL — SIGNIFICANT CHANGE UP (ref 0.2–1.2)
BUN SERPL-MCNC: 17 MG/DL — SIGNIFICANT CHANGE UP (ref 7–23)
CALCIUM SERPL-MCNC: 8.1 MG/DL — LOW (ref 8.4–10.5)
CHLORIDE SERPL-SCNC: 97 MMOL/L — LOW (ref 98–107)
CO2 SERPL-SCNC: 25 MMOL/L — SIGNIFICANT CHANGE UP (ref 22–31)
CREAT SERPL-MCNC: 0.65 MG/DL — SIGNIFICANT CHANGE UP (ref 0.5–1.3)
EOSINOPHIL # BLD AUTO: 0.32 K/UL — SIGNIFICANT CHANGE UP (ref 0–0.5)
EOSINOPHIL NFR BLD AUTO: 2.4 % — SIGNIFICANT CHANGE UP (ref 0–6)
GLUCOSE SERPL-MCNC: 80 MG/DL — SIGNIFICANT CHANGE UP (ref 70–99)
HCT VFR BLD CALC: 35.5 % — LOW (ref 39–50)
HGB BLD-MCNC: 11.3 G/DL — LOW (ref 13–17)
IMM GRANULOCYTES # BLD AUTO: 0.08 # — SIGNIFICANT CHANGE UP
IMM GRANULOCYTES NFR BLD AUTO: 0.6 % — SIGNIFICANT CHANGE UP (ref 0–1.5)
LYMPHOCYTES # BLD AUTO: 1.98 K/UL — SIGNIFICANT CHANGE UP (ref 1–3.3)
LYMPHOCYTES # BLD AUTO: 15 % — SIGNIFICANT CHANGE UP (ref 13–44)
MAGNESIUM SERPL-MCNC: 1.9 MG/DL — SIGNIFICANT CHANGE UP (ref 1.6–2.6)
MCHC RBC-ENTMCNC: 27.8 PG — SIGNIFICANT CHANGE UP (ref 27–34)
MCHC RBC-ENTMCNC: 31.8 % — LOW (ref 32–36)
MCV RBC AUTO: 87.2 FL — SIGNIFICANT CHANGE UP (ref 80–100)
MONOCYTES # BLD AUTO: 1.4 K/UL — HIGH (ref 0–0.9)
MONOCYTES NFR BLD AUTO: 10.6 % — SIGNIFICANT CHANGE UP (ref 2–14)
NEUTROPHILS # BLD AUTO: 9.37 K/UL — HIGH (ref 1.8–7.4)
NEUTROPHILS NFR BLD AUTO: 71.1 % — SIGNIFICANT CHANGE UP (ref 43–77)
NRBC # FLD: 0 — SIGNIFICANT CHANGE UP
PHOSPHATE SERPL-MCNC: 2.7 MG/DL — SIGNIFICANT CHANGE UP (ref 2.5–4.5)
PLATELET # BLD AUTO: 138 K/UL — LOW (ref 150–400)
PMV BLD: 11.4 FL — SIGNIFICANT CHANGE UP (ref 7–13)
POTASSIUM SERPL-MCNC: 4.3 MMOL/L — SIGNIFICANT CHANGE UP (ref 3.5–5.3)
POTASSIUM SERPL-SCNC: 4.3 MMOL/L — SIGNIFICANT CHANGE UP (ref 3.5–5.3)
PROT SERPL-MCNC: 8.1 G/DL — SIGNIFICANT CHANGE UP (ref 6–8.3)
RBC # BLD: 4.07 M/UL — LOW (ref 4.2–5.8)
RBC # FLD: 15.6 % — HIGH (ref 10.3–14.5)
SODIUM SERPL-SCNC: 133 MMOL/L — LOW (ref 135–145)
WBC # BLD: 13.19 K/UL — HIGH (ref 3.8–10.5)
WBC # FLD AUTO: 13.19 K/UL — HIGH (ref 3.8–10.5)

## 2018-02-11 PROCEDURE — 99233 SBSQ HOSP IP/OBS HIGH 50: CPT | Mod: GC

## 2018-02-11 RX ORDER — DEXAMETHASONE 0.5 MG/5ML
1 ELIXIR ORAL ONCE
Qty: 0 | Refills: 0 | Status: COMPLETED | OUTPATIENT
Start: 2018-02-11 | End: 2018-02-11

## 2018-02-11 RX ADMIN — HYDROMORPHONE HYDROCHLORIDE 6 MILLIGRAM(S): 2 INJECTION INTRAMUSCULAR; INTRAVENOUS; SUBCUTANEOUS at 02:31

## 2018-02-11 RX ADMIN — Medication 75 MILLIGRAM(S): at 07:08

## 2018-02-11 RX ADMIN — HYDROMORPHONE HYDROCHLORIDE 4 MILLIGRAM(S): 2 INJECTION INTRAMUSCULAR; INTRAVENOUS; SUBCUTANEOUS at 23:49

## 2018-02-11 RX ADMIN — HYDROMORPHONE HYDROCHLORIDE 4 MILLIGRAM(S): 2 INJECTION INTRAMUSCULAR; INTRAVENOUS; SUBCUTANEOUS at 05:24

## 2018-02-11 RX ADMIN — HYDROMORPHONE HYDROCHLORIDE 202.4 MILLIGRAM(S): 2 INJECTION INTRAMUSCULAR; INTRAVENOUS; SUBCUTANEOUS at 18:14

## 2018-02-11 RX ADMIN — METHADONE HYDROCHLORIDE 5 MILLIGRAM(S): 40 TABLET ORAL at 21:39

## 2018-02-11 RX ADMIN — Medication 1 TABLET(S): at 13:12

## 2018-02-11 RX ADMIN — Medication 3 MILLIGRAM(S): at 21:39

## 2018-02-11 RX ADMIN — HYDROMORPHONE HYDROCHLORIDE 4 MILLIGRAM(S): 2 INJECTION INTRAMUSCULAR; INTRAVENOUS; SUBCUTANEOUS at 09:00

## 2018-02-11 RX ADMIN — HYDROMORPHONE HYDROCHLORIDE 202.4 MILLIGRAM(S): 2 INJECTION INTRAMUSCULAR; INTRAVENOUS; SUBCUTANEOUS at 02:01

## 2018-02-11 RX ADMIN — Medication 75 MILLIGRAM(S): at 18:19

## 2018-02-11 RX ADMIN — HYDROMORPHONE HYDROCHLORIDE 6 MILLIGRAM(S): 2 INJECTION INTRAMUSCULAR; INTRAVENOUS; SUBCUTANEOUS at 11:05

## 2018-02-11 RX ADMIN — Medication 100 MILLIGRAM(S): at 13:12

## 2018-02-11 RX ADMIN — METHADONE HYDROCHLORIDE 5 MILLIGRAM(S): 40 TABLET ORAL at 14:30

## 2018-02-11 RX ADMIN — Medication 10 MILLIGRAM(S): at 07:09

## 2018-02-11 RX ADMIN — Medication 1 MILLIGRAM(S): at 10:35

## 2018-02-11 RX ADMIN — HYDROMORPHONE HYDROCHLORIDE 4 MILLIGRAM(S): 2 INJECTION INTRAMUSCULAR; INTRAVENOUS; SUBCUTANEOUS at 16:05

## 2018-02-11 RX ADMIN — HYDROMORPHONE HYDROCHLORIDE 4 MILLIGRAM(S): 2 INJECTION INTRAMUSCULAR; INTRAVENOUS; SUBCUTANEOUS at 08:46

## 2018-02-11 RX ADMIN — METHADONE HYDROCHLORIDE 2.5 MILLIGRAM(S): 40 TABLET ORAL at 07:06

## 2018-02-11 RX ADMIN — HYDROMORPHONE HYDROCHLORIDE 4 MILLIGRAM(S): 2 INJECTION INTRAMUSCULAR; INTRAVENOUS; SUBCUTANEOUS at 16:25

## 2018-02-11 RX ADMIN — HYDROMORPHONE HYDROCHLORIDE 202.4 MILLIGRAM(S): 2 INJECTION INTRAMUSCULAR; INTRAVENOUS; SUBCUTANEOUS at 10:48

## 2018-02-11 RX ADMIN — HYDROMORPHONE HYDROCHLORIDE 4 MILLIGRAM(S): 2 INJECTION INTRAMUSCULAR; INTRAVENOUS; SUBCUTANEOUS at 05:09

## 2018-02-11 NOTE — PROGRESS NOTE ADULT - PROBLEM SELECTOR PLAN 3
stage IV. Liver is likely the primary source as per imaging, though outside path suggesting pancreaticobiliary vs lung primary, with elevated ca 19-9 and CEA  - thrombocytopenia and anemia also likely 2/2 malignancy  -appreciate Oncology recs; OSH CT imaging uploaded  - await results of IR biopsy

## 2018-02-11 NOTE — PROGRESS NOTE ADULT - PROBLEM SELECTOR PLAN 1
left ilium, right acetabulum, and left sacrum in setting of newly diagnosed malignancy. CT findings c/w metastatic dz - primary to be determined  - outside path suggestive of pancreaticobiliary or lung primary (though necrotic tissue with minute focus of malignant tumor),   - s/p biopsy via IR on 2/8/18  - c/w dilaudid 6mg qd (02:00, 10:00, 18:00) yesterday and will continue methadone 2.5/5/5mg  w/ dilaudid 4mg q4h PRN for severe pain   - c/w bowel regimen  - f/u MR femur and MRI pelvis as per ortho recs  - appreciate ortho recs   - appreciate rad/onc input - potential candidate for radiation therapy, to be coordinated left ilium, right acetabulum, and left sacrum in setting of newly diagnosed malignancy. CT findings c/w metastatic dz - primary to be determined  - outside path suggestive of pancreaticobiliary or lung primary (though necrotic tissue with minute focus of malignant tumor),   - s/p biopsy via IR on 2/8/18  - c/w dilaudid 6mg qd (02:00, 10:00, 18:00) yesterday and will continue methadone 2.5/5/5mg  w/ dilaudid 4mg q3h PRN for severe pain   - lyrica also added  - c/w bowel regimen  - f/u MR femur and MRI pelvis as per ortho recs  - appreciate ortho recs   - appreciate rad/onc input - potential candidate for radiation therapy, to be coordinated

## 2018-02-11 NOTE — PROGRESS NOTE ADULT - ATTENDING COMMENTS
Patient seen and examined, d/w HS, agree with above.     Metastatic cancer of unknown primary with pathological fractures.   Reports pain bit better controlled today, just had large bowel movement shortly before my visit, happy that he was able to move his bowels. C/w pain management, appreciate palliative assistance with management of metastatic cancer pain 2/2 pathological fractures. c/w bowel regimen, manage opioid induced constipation. Awaiting results of IR biopsy. Further treatment to be determined based on pathology results. Attempting MRI with additional pain meds prn, (patient doesn't think he will be able to tolerate). Likely plan for palliative RT to pathological fractures once path results final.

## 2018-02-11 NOTE — PROGRESS NOTE ADULT - ASSESSMENT
46 yo Male with hx of drug abuse, hepatitis C, chronic back pain, transferred from outside hospital for pathologic fractures of left ilium, right acetabulum, and left sacrum, undergoing further malignancy workup s/p IR guided biopsy and pending MRI imaging of LLE in anticipation of potential RT or orthopedic intervention.

## 2018-02-11 NOTE — PROGRESS NOTE ADULT - SUBJECTIVE AND OBJECTIVE BOX
Patient is a 47y old  Male who presents with a chief complaint of hip pain (03 Feb 2018 12:32)      SUBJECTIVE / OVERNIGHT EVENTS:  Pt complaining of moderate pain overnight that makes it difficult for him to sleep. He is much more comfortable on the new pain regimen but still with 4-5/10 pain. Pt having trouble with MRI because of weight on pelvis. Discussed w/ pt that he will receive additional dilaudid 3mg IV prior to MRI. Denies CP, SOB, fevers, chills, n/v/d/c.     MEDICATIONS  (STANDING):  calcium carbonate 1250 mG + Vitamin D (OsCal 500 + D) 1 Tablet(s) Oral daily  docusate sodium 100 milliGRAM(s) Oral daily  HYDROmorphone  IVPB 6 milliGRAM(s) IV Intermittent <User Schedule>  melatonin 3 milliGRAM(s) Oral at bedtime  methadone    Tablet 5 milliGRAM(s) Oral <User Schedule>  methadone    Tablet 2.5 milliGRAM(s) Oral <User Schedule>  pregabalin 75 milliGRAM(s) Oral two times a day  propranolol 10 milliGRAM(s) Oral every 12 hours  senna 2 Tablet(s) Oral at bedtime    MEDICATIONS  (PRN):  HYDROmorphone  Injectable 3 milliGRAM(s) IV Push once PRN prior to MRI  HYDROmorphone  Injectable 4 milliGRAM(s) IV Push every 3 hours PRN Severe Pain (7 - 10)  naloxone Injectable 0.1 milliGRAM(s) IV Push every 3 minutes PRN Unarousable or RR <11  polyethylene glycol 3350 17 Gram(s) Oral daily PRN Constipation        CAPILLARY BLOOD GLUCOSE        I&O's Summary      PHYSICAL EXAM:  VS: T 99.4, HR 80, /80, RR 18, SpO2 99%  Gen: NAD this AM, resting comfortably in bed  HEENT: EOMI, mucous membranes moist, no oropharyngeal exudates  Neck:  no cervical lymphadenopathy  Pulmonary: CTAB, no rhonchi, wheezes or rales  Cardiac: RRR, normal S1S2, no r/m/g  GI:  soft, nontender, nondistended  Extremities: warm, well perfused, no peripheral edema  MSK: LLE ROM limited due to pain  Skin: skin intact, no skin tears or rashes or other lesions        LABS:                        12.4   12.65 )-----------( 141      ( 10 Feb 2018 06:15 )             38.6     02-10    132<L>  |  95<L>  |  19  ----------------------------<  111<H>  4.0   |  25  |  0.60    Ca    8.4      10 Feb 2018 06:15  Phos  3.1     02-10  Mg     2.0     02-10    TPro  9.0<H>  /  Alb  3.1<L>  /  TBili  0.8  /  DBili  x   /  AST  143<H>  /  ALT  108<H>  /  AlkPhos  144<H>  02-10    PT/INR - ( 10 Feb 2018 06:15 )   PT: 13.9 SEC;   INR: 1.20          PTT - ( 10 Feb 2018 06:15 )  PTT:32.1 SEC          RADIOLOGY & ADDITIONAL TESTS:    No new additional studies.

## 2018-02-12 DIAGNOSIS — K74.60 UNSPECIFIED CIRRHOSIS OF LIVER: ICD-10-CM

## 2018-02-12 LAB
ALBUMIN SERPL ELPH-MCNC: 2.9 G/DL — LOW (ref 3.3–5)
ALP SERPL-CCNC: 125 U/L — HIGH (ref 40–120)
ALT FLD-CCNC: 96 U/L — HIGH (ref 4–41)
AST SERPL-CCNC: 144 U/L — HIGH (ref 4–40)
BASOPHILS # BLD AUTO: 0.03 K/UL — SIGNIFICANT CHANGE UP (ref 0–0.2)
BASOPHILS NFR BLD AUTO: 0.3 % — SIGNIFICANT CHANGE UP (ref 0–2)
BILIRUB SERPL-MCNC: 0.8 MG/DL — SIGNIFICANT CHANGE UP (ref 0.2–1.2)
BUN SERPL-MCNC: 14 MG/DL — SIGNIFICANT CHANGE UP (ref 7–23)
CALCIUM SERPL-MCNC: 8.3 MG/DL — LOW (ref 8.4–10.5)
CHLORIDE SERPL-SCNC: 99 MMOL/L — SIGNIFICANT CHANGE UP (ref 98–107)
CO2 SERPL-SCNC: 26 MMOL/L — SIGNIFICANT CHANGE UP (ref 22–31)
CREAT SERPL-MCNC: 0.54 MG/DL — SIGNIFICANT CHANGE UP (ref 0.5–1.3)
EOSINOPHIL # BLD AUTO: 0.18 K/UL — SIGNIFICANT CHANGE UP (ref 0–0.5)
EOSINOPHIL NFR BLD AUTO: 2 % — SIGNIFICANT CHANGE UP (ref 0–6)
GLUCOSE SERPL-MCNC: 88 MG/DL — SIGNIFICANT CHANGE UP (ref 70–99)
HCT VFR BLD CALC: 32.9 % — LOW (ref 39–50)
HCV RNA FLD QL NAA+PROBE: SIGNIFICANT CHANGE UP
HCV RNA SPEC QL PROBE+SIG AMP: DETECTED — SIGNIFICANT CHANGE UP
HGB BLD-MCNC: 10.5 G/DL — LOW (ref 13–17)
IMM GRANULOCYTES # BLD AUTO: 0.05 # — SIGNIFICANT CHANGE UP
IMM GRANULOCYTES NFR BLD AUTO: 0.6 % — SIGNIFICANT CHANGE UP (ref 0–1.5)
LYMPHOCYTES # BLD AUTO: 1.17 K/UL — SIGNIFICANT CHANGE UP (ref 1–3.3)
LYMPHOCYTES # BLD AUTO: 13.1 % — SIGNIFICANT CHANGE UP (ref 13–44)
MAGNESIUM SERPL-MCNC: 1.9 MG/DL — SIGNIFICANT CHANGE UP (ref 1.6–2.6)
MCHC RBC-ENTMCNC: 27.6 PG — SIGNIFICANT CHANGE UP (ref 27–34)
MCHC RBC-ENTMCNC: 31.9 % — LOW (ref 32–36)
MCV RBC AUTO: 86.4 FL — SIGNIFICANT CHANGE UP (ref 80–100)
MONOCYTES # BLD AUTO: 0.96 K/UL — HIGH (ref 0–0.9)
MONOCYTES NFR BLD AUTO: 10.8 % — SIGNIFICANT CHANGE UP (ref 2–14)
NEUTROPHILS # BLD AUTO: 6.54 K/UL — SIGNIFICANT CHANGE UP (ref 1.8–7.4)
NEUTROPHILS NFR BLD AUTO: 73.2 % — SIGNIFICANT CHANGE UP (ref 43–77)
NRBC # FLD: 0 — SIGNIFICANT CHANGE UP
PHOSPHATE SERPL-MCNC: 3.1 MG/DL — SIGNIFICANT CHANGE UP (ref 2.5–4.5)
PLATELET # BLD AUTO: 107 K/UL — LOW (ref 150–400)
PMV BLD: 11 FL — SIGNIFICANT CHANGE UP (ref 7–13)
POTASSIUM SERPL-MCNC: 4.3 MMOL/L — SIGNIFICANT CHANGE UP (ref 3.5–5.3)
POTASSIUM SERPL-SCNC: 4.3 MMOL/L — SIGNIFICANT CHANGE UP (ref 3.5–5.3)
PROT SERPL-MCNC: 7.8 G/DL — SIGNIFICANT CHANGE UP (ref 6–8.3)
RBC # BLD: 3.81 M/UL — LOW (ref 4.2–5.8)
RBC # FLD: 15.2 % — HIGH (ref 10.3–14.5)
SODIUM SERPL-SCNC: 135 MMOL/L — SIGNIFICANT CHANGE UP (ref 135–145)
WBC # BLD: 8.93 K/UL — SIGNIFICANT CHANGE UP (ref 3.8–10.5)
WBC # FLD AUTO: 8.93 K/UL — SIGNIFICANT CHANGE UP (ref 3.8–10.5)

## 2018-02-12 PROCEDURE — 99232 SBSQ HOSP IP/OBS MODERATE 35: CPT | Mod: GC

## 2018-02-12 PROCEDURE — 99233 SBSQ HOSP IP/OBS HIGH 50: CPT | Mod: GC

## 2018-02-12 RX ORDER — ENOXAPARIN SODIUM 100 MG/ML
40 INJECTION SUBCUTANEOUS DAILY
Qty: 0 | Refills: 0 | Status: DISCONTINUED | OUTPATIENT
Start: 2018-02-12 | End: 2018-02-16

## 2018-02-12 RX ADMIN — METHADONE HYDROCHLORIDE 5 MILLIGRAM(S): 40 TABLET ORAL at 14:30

## 2018-02-12 RX ADMIN — HYDROMORPHONE HYDROCHLORIDE 4 MILLIGRAM(S): 2 INJECTION INTRAMUSCULAR; INTRAVENOUS; SUBCUTANEOUS at 16:12

## 2018-02-12 RX ADMIN — Medication 10 MILLIGRAM(S): at 06:14

## 2018-02-12 RX ADMIN — HYDROMORPHONE HYDROCHLORIDE 202.4 MILLIGRAM(S): 2 INJECTION INTRAMUSCULAR; INTRAVENOUS; SUBCUTANEOUS at 01:59

## 2018-02-12 RX ADMIN — HYDROMORPHONE HYDROCHLORIDE 4 MILLIGRAM(S): 2 INJECTION INTRAMUSCULAR; INTRAVENOUS; SUBCUTANEOUS at 23:27

## 2018-02-12 RX ADMIN — Medication 75 MILLIGRAM(S): at 06:13

## 2018-02-12 RX ADMIN — Medication 1 TABLET(S): at 15:57

## 2018-02-12 RX ADMIN — Medication 10 MILLIGRAM(S): at 18:19

## 2018-02-12 RX ADMIN — Medication 75 MILLIGRAM(S): at 18:19

## 2018-02-12 RX ADMIN — HYDROMORPHONE HYDROCHLORIDE 202.4 MILLIGRAM(S): 2 INJECTION INTRAMUSCULAR; INTRAVENOUS; SUBCUTANEOUS at 11:15

## 2018-02-12 RX ADMIN — HYDROMORPHONE HYDROCHLORIDE 6 MILLIGRAM(S): 2 INJECTION INTRAMUSCULAR; INTRAVENOUS; SUBCUTANEOUS at 02:29

## 2018-02-12 RX ADMIN — HYDROMORPHONE HYDROCHLORIDE 4 MILLIGRAM(S): 2 INJECTION INTRAMUSCULAR; INTRAVENOUS; SUBCUTANEOUS at 19:49

## 2018-02-12 RX ADMIN — HYDROMORPHONE HYDROCHLORIDE 4 MILLIGRAM(S): 2 INJECTION INTRAMUSCULAR; INTRAVENOUS; SUBCUTANEOUS at 04:13

## 2018-02-12 RX ADMIN — HYDROMORPHONE HYDROCHLORIDE 202.4 MILLIGRAM(S): 2 INJECTION INTRAMUSCULAR; INTRAVENOUS; SUBCUTANEOUS at 18:19

## 2018-02-12 RX ADMIN — HYDROMORPHONE HYDROCHLORIDE 4 MILLIGRAM(S): 2 INJECTION INTRAMUSCULAR; INTRAVENOUS; SUBCUTANEOUS at 20:04

## 2018-02-12 RX ADMIN — METHADONE HYDROCHLORIDE 5 MILLIGRAM(S): 40 TABLET ORAL at 22:10

## 2018-02-12 RX ADMIN — HYDROMORPHONE HYDROCHLORIDE 4 MILLIGRAM(S): 2 INJECTION INTRAMUSCULAR; INTRAVENOUS; SUBCUTANEOUS at 23:12

## 2018-02-12 RX ADMIN — HYDROMORPHONE HYDROCHLORIDE 4 MILLIGRAM(S): 2 INJECTION INTRAMUSCULAR; INTRAVENOUS; SUBCUTANEOUS at 09:04

## 2018-02-12 RX ADMIN — HYDROMORPHONE HYDROCHLORIDE 6 MILLIGRAM(S): 2 INJECTION INTRAMUSCULAR; INTRAVENOUS; SUBCUTANEOUS at 11:15

## 2018-02-12 RX ADMIN — SENNA PLUS 2 TABLET(S): 8.6 TABLET ORAL at 22:10

## 2018-02-12 RX ADMIN — METHADONE HYDROCHLORIDE 2.5 MILLIGRAM(S): 40 TABLET ORAL at 06:13

## 2018-02-12 RX ADMIN — Medication 3 MILLIGRAM(S): at 22:10

## 2018-02-12 RX ADMIN — HYDROMORPHONE HYDROCHLORIDE 4 MILLIGRAM(S): 2 INJECTION INTRAMUSCULAR; INTRAVENOUS; SUBCUTANEOUS at 00:04

## 2018-02-12 RX ADMIN — ENOXAPARIN SODIUM 40 MILLIGRAM(S): 100 INJECTION SUBCUTANEOUS at 15:56

## 2018-02-12 RX ADMIN — HYDROMORPHONE HYDROCHLORIDE 4 MILLIGRAM(S): 2 INJECTION INTRAMUSCULAR; INTRAVENOUS; SUBCUTANEOUS at 03:58

## 2018-02-12 RX ADMIN — HYDROMORPHONE HYDROCHLORIDE 6 MILLIGRAM(S): 2 INJECTION INTRAMUSCULAR; INTRAVENOUS; SUBCUTANEOUS at 18:19

## 2018-02-12 RX ADMIN — HYDROMORPHONE HYDROCHLORIDE 4 MILLIGRAM(S): 2 INJECTION INTRAMUSCULAR; INTRAVENOUS; SUBCUTANEOUS at 09:19

## 2018-02-12 RX ADMIN — HYDROMORPHONE HYDROCHLORIDE 4 MILLIGRAM(S): 2 INJECTION INTRAMUSCULAR; INTRAVENOUS; SUBCUTANEOUS at 15:57

## 2018-02-12 NOTE — PROGRESS NOTE ADULT - PROBLEM SELECTOR PLAN 5
- resolved after repletion  - continue to monitor DVT ppx: enoxaparin 40mg qd  Diet: Regular s/p detox.   - f/u social work  - methadone as per palliative care

## 2018-02-12 NOTE — PROGRESS NOTE ADULT - PROBLEM SELECTOR PLAN 1
Better controlled  No changes today since he may obtain his MRI tonight  Maintain ATC methadone 2.5/5/5 q8H  Begin ATC IVPB dilaudid 6mg q8H interspersed  Will likely begin uptitration tomorrow   Maintain IV dilaudid 4mg q3H with stipulations relative to the timing of ATC medications  If pain Rx is needed early consider contacting palliative care to determine if doses could be given early  Any dose beyond that must be IVPB/in NS  Avoid benzodiazepines given current methadone use

## 2018-02-12 NOTE — PROGRESS NOTE ADULT - PROBLEM SELECTOR PLAN 2
As per outside records, positive IgG. Complicated by liver cirrhosis, esophageal varices, portal HTN. Outside labs show thrombocytopenia,, transaminitis,, and elevated alk phos, synthetic function preserved (INR wnl). HCV genotype 1a  - Workup per hepatology pending, defer treatment for HCV at this time given malignancy workup in progress As per outside records, positive IgG. Complicated by liver cirrhosis, esophageal varices, portal HTN. Outside labs show thrombocytopenia, transaminitis, and elevated alk phos, synthetic function preserved (INR wnl). HCV genotype 1a  - Workup per hepatology pending, defer treatment for HCV at this time given malignancy workup in progress

## 2018-02-12 NOTE — PROGRESS NOTE ADULT - PROBLEM SELECTOR PLAN 6
DVT ppx: SCIDs in setting of thrombocytopenia   Diet: Regular - appreciate nutrition recs  - c/w ensure enlive supplementation DVT ppx: enoxaparin 40mg qd  Diet: Regular

## 2018-02-12 NOTE — PROGRESS NOTE ADULT - SUBJECTIVE AND OBJECTIVE BOX
Tacos Garcia  PGY 1  Team 2  Pager [phone]    Patient is a 47y old  Male who presents with a chief complaint of hip pain (03 Feb 2018 12:32)      SUBJECTIVE / OVERNIGHT EVENTS:    MEDICATIONS  (STANDING):  calcium carbonate 1250 mG + Vitamin D (OsCal 500 + D) 1 Tablet(s) Oral daily  docusate sodium 100 milliGRAM(s) Oral daily  HYDROmorphone  IVPB 6 milliGRAM(s) IV Intermittent <User Schedule>  melatonin 3 milliGRAM(s) Oral at bedtime  methadone    Tablet 5 milliGRAM(s) Oral <User Schedule>  methadone    Tablet 2.5 milliGRAM(s) Oral <User Schedule>  pregabalin 75 milliGRAM(s) Oral two times a day  propranolol 10 milliGRAM(s) Oral every 12 hours  senna 2 Tablet(s) Oral at bedtime    MEDICATIONS  (PRN):  HYDROmorphone  Injectable 3 milliGRAM(s) IV Push once PRN prior to MRI  HYDROmorphone  Injectable 4 milliGRAM(s) IV Push every 3 hours PRN Severe Pain (7 - 10)  naloxone Injectable 0.1 milliGRAM(s) IV Push every 3 minutes PRN Unarouseable or RR <11  polyethylene glycol 3350 17 Gram(s) Oral daily PRN Constipation      Vital Signs Last 24 Hrs  T(C): 36.7 (12 Feb 2018 06:08), Max: 37.1 (11 Feb 2018 21:20)  T(F): 98.1 (12 Feb 2018 06:08), Max: 98.7 (11 Feb 2018 21:20)  HR: 76 (12 Feb 2018 06:08) (76 - 90)  BP: 114/72 (12 Feb 2018 06:08) (103/62 - 114/72)  BP(mean): --  RR: 18 (12 Feb 2018 06:08) (17 - 19)  SpO2: 99% (12 Feb 2018 06:08) (97% - 99%)  CAPILLARY BLOOD GLUCOSE        I&O's Summary    11 Feb 2018 07:01  -  12 Feb 2018 07:00  --------------------------------------------------------  IN: 0 mL / OUT: 800 mL / NET: -800 mL        PHYSICAL EXAM:  GENERAL: NAD, well-developed  HEAD:  Atraumatic, Normocephalic  EYES: EOMI, PERRLA, conjunctiva and sclera clear  NECK: Supple, No JVD  CHEST/LUNG: Clear to auscultation bilaterally; No wheeze  HEART: Regular rate and rhythm; No murmurs, rubs, or gallops  ABDOMEN: Soft, Nontender, Nondistended; Bowel sounds present  EXTREMITIES:  2+ Peripheral Pulses, No clubbing, cyanosis, or edema  PSYCH: AAOx3  NEUROLOGY: non-focal  SKIN: No rashes or lesions    LABS:                        10.5   8.93  )-----------( 107      ( 12 Feb 2018 06:00 )             32.9     02-12    135  |  99  |  14  ----------------------------<  88  4.3   |  26  |  0.54    Ca    8.3<L>      12 Feb 2018 06:00  Phos  3.1     02-12  Mg     1.9     02-12    TPro  7.8  /  Alb  2.9<L>  /  TBili  0.8  /  DBili  x   /  AST  144<H>  /  ALT  96<H>  /  AlkPhos  125<H>  02-12              RADIOLOGY & ADDITIONAL TESTS:    Imaging Personally Reviewed:    Consultant(s) Notes Reviewed:      Care Discussed with Consultants/Other Providers: Tacos Garcia  PGY 1  Team1    Patient is a 47y old  Male who presents with a chief complaint of hip pain (03 Feb 2018 12:32)      SUBJECTIVE / OVERNIGHT EVENTS:  Patient states that the pain has been well controlled on current regimen. Continues to have left hip pain with movement with decreased ROM due to pain.  He states that he is ready to try having the MRI again.   Had large BM 2/11.  He denies fever, chills, chest pain, abdominal pain, N/V, diarrhea, dysuria.    MEDICATIONS  (STANDING):  calcium carbonate 1250 mG + Vitamin D (OsCal 500 + D) 1 Tablet(s) Oral daily  docusate sodium 100 milliGRAM(s) Oral daily  HYDROmorphone  IVPB 6 milliGRAM(s) IV Intermittent <User Schedule>  melatonin 3 milliGRAM(s) Oral at bedtime  methadone    Tablet 5 milliGRAM(s) Oral <User Schedule>  methadone    Tablet 2.5 milliGRAM(s) Oral <User Schedule>  pregabalin 75 milliGRAM(s) Oral two times a day  propranolol 10 milliGRAM(s) Oral every 12 hours  senna 2 Tablet(s) Oral at bedtime    MEDICATIONS  (PRN):  HYDROmorphone  Injectable 3 milliGRAM(s) IV Push once PRN prior to MRI  HYDROmorphone  Injectable 4 milliGRAM(s) IV Push every 3 hours PRN Severe Pain (7 - 10)  naloxone Injectable 0.1 milliGRAM(s) IV Push every 3 minutes PRN Unarouseable or RR <11  polyethylene glycol 3350 17 Gram(s) Oral daily PRN Constipation      Vital Signs Last 24 Hrs  T(C): 36.7 (12 Feb 2018 06:08), Max: 37.1 (11 Feb 2018 21:20)  T(F): 98.1 (12 Feb 2018 06:08), Max: 98.7 (11 Feb 2018 21:20)  HR: 76 (12 Feb 2018 06:08) (76 - 90)  BP: 114/72 (12 Feb 2018 06:08) (103/62 - 114/72)  BP(mean): --  RR: 18 (12 Feb 2018 06:08) (17 - 19)  SpO2: 99% (12 Feb 2018 06:08) (97% - 99%)  CAPILLARY BLOOD GLUCOSE        I&O's Summary    11 Feb 2018 07:01  -  12 Feb 2018 07:00  --------------------------------------------------------  IN: 0 mL / OUT: 800 mL / NET: -800 mL        PHYSICAL EXAM:  Gen: NAD this AM, resting comfortably in bed, eating in bed  HEENT: EOMI, mucous membranes moist, no oropharyngeal exudates  Neck:  no cervical lymphadenopathy  Pulmonary: CTAB, no rhonchi, wheezes or rales  Cardiac: RRR, normal S1S2, no r/m/g  GI:  soft, nontender, nondistended  Extremities: warm, well perfused, no peripheral edema  MSK: LLE ROM limited due to pain  Skin: skin intact, no skin tears or rashes or other lesions    LABS:                        10.5   8.93  )-----------( 107      ( 12 Feb 2018 06:00 )             32.9     02-12    135  |  99  |  14  ----------------------------<  88  4.3   |  26  |  0.54    Ca    8.3<L>      12 Feb 2018 06:00  Phos  3.1     02-12  Mg     1.9     02-12    TPro  7.8  /  Alb  2.9<L>  /  TBili  0.8  /  DBili  x   /  AST  144<H>  /  ALT  96<H>  /  AlkPhos  125<H>  02-12              RADIOLOGY & ADDITIONAL TESTS:    Imaging Personally Reviewed:    Consultant(s) Notes Reviewed:      Care Discussed with Consultants/Other Providers: Tacos Garcia  PGY 1  Team1    Patient is a 47y old  Male who presents with a chief complaint of hip pain (03 Feb 2018 12:32)      SUBJECTIVE / OVERNIGHT EVENTS:  Patient states that the pain has been well controlled on current regimen. Continues to have left hip pain with movement with decreased ROM due to pain.  He states that he is ready to try having the MRI again.   Had large BM 2/11.  He denies fever, chills, chest pain, abdominal pain, N/V, diarrhea, dysuria.    MEDICATIONS  (STANDING):  calcium carbonate 1250 mG + Vitamin D (OsCal 500 + D) 1 Tablet(s) Oral daily  docusate sodium 100 milliGRAM(s) Oral daily  HYDROmorphone  IVPB 6 milliGRAM(s) IV Intermittent <User Schedule>  melatonin 3 milliGRAM(s) Oral at bedtime  methadone    Tablet 5 milliGRAM(s) Oral <User Schedule>  methadone    Tablet 2.5 milliGRAM(s) Oral <User Schedule>  pregabalin 75 milliGRAM(s) Oral two times a day  propranolol 10 milliGRAM(s) Oral every 12 hours  senna 2 Tablet(s) Oral at bedtime    MEDICATIONS  (PRN):  HYDROmorphone  Injectable 3 milliGRAM(s) IV Push once PRN prior to MRI  HYDROmorphone  Injectable 4 milliGRAM(s) IV Push every 3 hours PRN Severe Pain (7 - 10)  naloxone Injectable 0.1 milliGRAM(s) IV Push every 3 minutes PRN Unarouseable or RR <11  polyethylene glycol 3350 17 Gram(s) Oral daily PRN Constipation      Vital Signs Last 24 Hrs  T(C): 36.7 (12 Feb 2018 06:08), Max: 37.1 (11 Feb 2018 21:20)  T(F): 98.1 (12 Feb 2018 06:08), Max: 98.7 (11 Feb 2018 21:20)  HR: 76 (12 Feb 2018 06:08) (76 - 90)  BP: 114/72 (12 Feb 2018 06:08) (103/62 - 114/72)  BP(mean): --  RR: 18 (12 Feb 2018 06:08) (17 - 19)  SpO2: 99% (12 Feb 2018 06:08) (97% - 99%)  CAPILLARY BLOOD GLUCOSE        I&O's Summary    11 Feb 2018 07:01  -  12 Feb 2018 07:00  --------------------------------------------------------  IN: 0 mL / OUT: 800 mL / NET: -800 mL        PHYSICAL EXAM:  Gen: NAD this AM, resting comfortably in bed, eating in bed  HEENT: EOMI, mucous membranes moist, no oropharyngeal exudates  Neck:  no cervical lymphadenopathy  Pulmonary: CTAB, no rhonchi, wheezes or rales  Cardiac: RRR, normal S1S2, no r/m/g  GI:  soft, nontender, nondistended  Extremities: warm, well perfused, no peripheral edema  MSK: LLE ROM limited due to pain  Skin: skin intact, no skin tears or rashes or other lesions    LABS:                        10.5   8.93  )-----------( 107      ( 12 Feb 2018 06:00 )             32.9     02-12    135  |  99  |  14  ----------------------------<  88  4.3   |  26  |  0.54    Ca    8.3<L>      12 Feb 2018 06:00  Phos  3.1     02-12  Mg     1.9     02-12    TPro  7.8  /  Alb  2.9<L>  /  TBili  0.8  /  DBili  x   /  AST  144<H>  /  ALT  96<H>  /  AlkPhos  125<H>  02-12              RADIOLOGY & ADDITIONAL TESTS:    Imaging Personally Reviewed:    Consultant(s) Notes Reviewed:      Care Discussed with Consultants/Other Providers: Palliative Dr. Melgar.

## 2018-02-12 NOTE — PROGRESS NOTE ADULT - SUBJECTIVE AND OBJECTIVE BOX
Pt seen and examined  Marked improvement in overall pain  Dilaudid being interspersed works well  The patient is very concerned about his overall state of health, and what this pain signifies  He is very anxious about the MRI but looking to do all further advance his care        T(C): 37.4 (02-12-18 @ 14:03), Max: 37.4 (02-12-18 @ 14:03)  HR: 83 (02-12-18 @ 14:03) (76 - 89)  BP: 113/72 (02-12-18 @ 14:03) (103/62 - 114/72)  RR: 19 (02-12-18 @ 14:03) (18 - 19)  SpO2: 97% (02-12-18 @ 14:03) (97% - 99%)  Wt(kg): --      11 Feb 2018 07:01  -  12 Feb 2018 07:00  --------------------------------------------------------  IN:  Total IN: 0 mL    OUT:    Voided: 800 mL  Total OUT: 800 mL    Total NET: -800 mL          02-11 @ 07:01  -  02-12 @ 07:00  --------------------------------------------------------  IN: 0 mL / OUT: 800 mL / NET: -800 mL      CAPILLARY BLOOD GLUCOSE            calcium carbonate 1250 mG + Vitamin D (OsCal 500 + D) 1 Tablet(s) Oral daily  docusate sodium 100 milliGRAM(s) Oral daily  enoxaparin Injectable 40 milliGRAM(s) SubCutaneous daily  HYDROmorphone  Injectable 3 milliGRAM(s) IV Push once PRN  HYDROmorphone  Injectable 4 milliGRAM(s) IV Push every 3 hours PRN  HYDROmorphone  IVPB 6 milliGRAM(s) IV Intermittent <User Schedule>  melatonin 3 milliGRAM(s) Oral at bedtime  methadone    Tablet 5 milliGRAM(s) Oral <User Schedule>  methadone    Tablet 2.5 milliGRAM(s) Oral <User Schedule>  naloxone Injectable 0.1 milliGRAM(s) IV Push every 3 minutes PRN  polyethylene glycol 3350 17 Gram(s) Oral daily PRN  pregabalin 75 milliGRAM(s) Oral two times a day  propranolol 10 milliGRAM(s) Oral every 12 hours  senna 2 Tablet(s) Oral at bedtime          02-12    135  |  99  |  14  ----------------------------<  88  4.3   |  26  |  0.54    Ca    8.3<L>      12 Feb 2018 06:00  Phos  3.1     02-12  Mg     1.9     02-12    TPro  7.8  /  Alb  2.9<L>  /  TBili  0.8  /  DBili  x   /  AST  144<H>  /  ALT  96<H>  /  AlkPhos  125<H>  02-12      Procalc  BNP  ABG                          10.5   8.93  )-----------( 107      ( 12 Feb 2018 06:00 )             32.9           blood and urine cultures              PERTINENT PMH REVIEWED:  [x ] YES [ ] NO           SOCIAL HISTORY:  Significant other/partner:  [x ] YES  [ ] NO            Children:  [x ] YES  [ ] NO                   Mosque/Spirituality: TBD  Substance hx:  [x ] YES   [ ] NO           Tobacco hx:  [ x] YES  [ ] NO             Alcohol hx: [x ] YES  [ ] NO        Home Opioid hx:  [x ] YES  [ ] NO   Living Situation: [ ] Home  [ ] Long term care  [ ] Rehab Shelter    REFERRALS:   [ ] Chaplaincy  [ ] Hospice  [ ] Child Life  [ ] Social Work  [ ] Case management [ ] Holistic Therapy     FAMILY HISTORY:  No pertinent family history in first degree relatives    [ x] Family history non contributory     BASELINE ADLs (prior to admission):  Independent [x ] moderately [ ] fully   Dependent   [ ] moderately [ ] fully    ADVANCE DIRECTIVES:  [ ] YES [x ] NO   DNR [ ] YES [x ] NO                      MOLST  [ ] YES [ x] NO    Living Will  [ ] YES [x ] NO    Health Care Proxy [ ] YES  [x ] NO      [x ] Surrogate  [ ] HCP  [ ] Guardian:     Unbefriended, has adult children but no   idea about their whereabouts                                                        Phone#:    Allergies    No Known Allergies    Intolerances               PRESENT SYMPTOMS:  Source: [x ] Patient   [ ] Family   [ ] Team     Pain: [x ] YES [ ] NO  OLDCARTS:  See above    Dyspnea: [x ] YES [ ] NO On exertion  Anxiety: [x ] YES [ ] NO Minimal  Fatigue: [x ] YES [ ] NO   Nausea: [ ] YES [ x] NO  Loss of appetite: [ ] YES [x ] NO   Constipation: [ ] YES [x ] NO     Other Symptoms:  [ x] All other review of systems negative   [ ] Unable to obtain due to poor mentation     Karnofsky Performance Score/Palliative Performance Status Version 2:     30     % due to Fx  Protein Calorie Malutrition:  [ ] Mild   [ ] Moderate   [ ] Severe       Physical Exam:    General: [ x] Alert,  A&O x     [ ] lethargic   [ ] Agitated   [ ] Cachexia   HEENT: [ ] Normal   [ ] Dry mouth   [ ] ET Tube    [ ] Trach   Lungs: [ x] Clear [ ] Rhonchi  [ ] Crackles [ ] Wheezing [ ] Tachypnea  [ ] Audible excessive secretions   Cardiovascular:  [ ] Regular rate and rhythm  [ ] Irregular [ x] Tachycardia   [ ] Bradycardia   Abdomen: [ x] Soft  [ ] Distended  [ ]  [ ] +BS  [ ] Non tender [ x] Tender  [ ]PEG   [ ] NGT   Last BM:     Genitourinary: [x ] Normal [ ] Incontinent   [ ] Oliguria/Anuria   [ ] Knox  Musculoskeletal:  [ ] Normal   [ ] Generalized weakness  [ x] Bedbound   Neurological: [x ] No focal deficits  [ ] Cognitive impairment     Skin: [x ] Normal   [ ] Pressure ulcers     LABS:                           I&O's Summary      RADIOLOGY & ADDITIONAL STUDIES:

## 2018-02-12 NOTE — PROGRESS NOTE ADULT - PROBLEM SELECTOR PLAN 3
stage IV. 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  -appreciate Oncology recs; OSH CT imaging uploaded  - await results of IR biopsy, f/u w/ path no current results. Likely due to HCV vs etoh induced w/ thrombocytopenia and mild increase INR w/o hyperbili.

## 2018-02-12 NOTE — PROGRESS NOTE ADULT - PROBLEM SELECTOR PLAN 1
left ilium, right acetabulum, and left sacrum in setting of newly diagnosed malignancy. CT findings c/w metastatic dz - primary to be determined  - outside path suggestive of pancreaticobiliary or lung primary (though necrotic tissue with minute focus of malignant tumor),   - s/p biopsy via IR on 2/8/18  - c/w dilaudid 6mg qd (02:00, 10:00, 18:00) yesterday and will continue methadone 2.5/5/5mg  w/ dilaudid 4mg q3h PRN for severe pain   - lyrica also added  - c/w bowel regimen  - f/u MR femur and MRI pelvis as per ortho recs  - appreciate ortho recs   - appreciate rad/onc input - potential candidate for radiation therapy, to be coordinated left ilium, right acetabulum, and left sacrum in setting of newly diagnosed malignancy. CT findings c/w metastatic dz - primary to be determined  - outside path suggestive of pancreaticobiliary or lung primary (though necrotic tissue with  - s/p biopsy via IR on 2/8/18 f/u results to determine tx option from ortho standpoint  - c/w hydromorphone 6mg qd (02:00, 10:00, 18:00) yesterday and will continue methadone 2.5/5/5mg  w/ hydromorphone 4mg q3h PRN for severe pain; pregabalin also added  - c/w bowel regimen due to opioid induced constipation  - f/u MR femur and MRI pelvis as per ortho recs  - appreciate ortho/ rad/onc input - potential candidate for radiation therapy, to be coordinated depending on biopsy result.

## 2018-02-12 NOTE — PROGRESS NOTE ADULT - ATTENDING COMMENTS
Patient seen and examined, d/w HS, agree with above.     Awaiting results of IR biopsy, team called path, no read yet. Treatment to be determined pending final diagnosis. Onc recommending obtaining physical slides from OSH for pathology here to review, and reaching out to rad/onc regarding starting RT. Patient reports pain better controlled, will try for MRI. D/w palliative, possible further adjustment to his regimen tomorrow (possible increase in methadone dosage).

## 2018-02-12 NOTE — PROGRESS NOTE ADULT - PROBLEM SELECTOR PLAN 4
s/p detox.   - f/u social work  - methadone as per palliative care stage IV currently unclear source likely due to HCC vs pancreaticobiliary vs lung with elevated ca 19-9 and CEA.  -appreciate Oncology recs; OSH CT imaging uploaded  - await results of IR biopsy, f/u w/ path no current results.

## 2018-02-12 NOTE — PROGRESS NOTE ADULT - PROBLEM SELECTOR PLAN 6
Improved, likeyl pain related  I would not recommend benzodiazepines given his methadone use which may put him at risk for significant respiratory depression  Consider non pharmacologic sleep hygiene techniques.

## 2018-02-12 NOTE — CHART NOTE - NSCHARTNOTEFT_GEN_A_CORE
please obtain actual pathology slides from [facility] and have them sent here to our pathology department for review. will touch base with radiation oncology tomorrow 2/13/18 to see if they can radiate given we do have pathology in his chart from [facility] indicating he does have a malignancy

## 2018-02-13 PROCEDURE — 99233 SBSQ HOSP IP/OBS HIGH 50: CPT | Mod: GC

## 2018-02-13 RX ORDER — METHADONE HYDROCHLORIDE 40 MG/1
2.5 TABLET ORAL
Qty: 0 | Refills: 0 | Status: DISCONTINUED | OUTPATIENT
Start: 2018-02-13 | End: 2018-02-14

## 2018-02-13 RX ORDER — METHADONE HYDROCHLORIDE 40 MG/1
5 TABLET ORAL
Qty: 0 | Refills: 0 | Status: DISCONTINUED | OUTPATIENT
Start: 2018-02-13 | End: 2018-02-14

## 2018-02-13 RX ORDER — HYDROMORPHONE HYDROCHLORIDE 2 MG/ML
3 INJECTION INTRAMUSCULAR; INTRAVENOUS; SUBCUTANEOUS ONCE
Qty: 0 | Refills: 0 | Status: DISCONTINUED | OUTPATIENT
Start: 2018-02-13 | End: 2018-02-16

## 2018-02-13 RX ADMIN — Medication 1 TABLET(S): at 14:05

## 2018-02-13 RX ADMIN — Medication 75 MILLIGRAM(S): at 05:59

## 2018-02-13 RX ADMIN — HYDROMORPHONE HYDROCHLORIDE 4 MILLIGRAM(S): 2 INJECTION INTRAMUSCULAR; INTRAVENOUS; SUBCUTANEOUS at 03:59

## 2018-02-13 RX ADMIN — HYDROMORPHONE HYDROCHLORIDE 4 MILLIGRAM(S): 2 INJECTION INTRAMUSCULAR; INTRAVENOUS; SUBCUTANEOUS at 23:15

## 2018-02-13 RX ADMIN — SENNA PLUS 2 TABLET(S): 8.6 TABLET ORAL at 21:32

## 2018-02-13 RX ADMIN — HYDROMORPHONE HYDROCHLORIDE 6 MILLIGRAM(S): 2 INJECTION INTRAMUSCULAR; INTRAVENOUS; SUBCUTANEOUS at 18:20

## 2018-02-13 RX ADMIN — ENOXAPARIN SODIUM 40 MILLIGRAM(S): 100 INJECTION SUBCUTANEOUS at 14:05

## 2018-02-13 RX ADMIN — HYDROMORPHONE HYDROCHLORIDE 202.4 MILLIGRAM(S): 2 INJECTION INTRAMUSCULAR; INTRAVENOUS; SUBCUTANEOUS at 10:18

## 2018-02-13 RX ADMIN — Medication 100 MILLIGRAM(S): at 14:05

## 2018-02-13 RX ADMIN — HYDROMORPHONE HYDROCHLORIDE 4 MILLIGRAM(S): 2 INJECTION INTRAMUSCULAR; INTRAVENOUS; SUBCUTANEOUS at 15:35

## 2018-02-13 RX ADMIN — HYDROMORPHONE HYDROCHLORIDE 4 MILLIGRAM(S): 2 INJECTION INTRAMUSCULAR; INTRAVENOUS; SUBCUTANEOUS at 04:14

## 2018-02-13 RX ADMIN — Medication 3 MILLIGRAM(S): at 21:32

## 2018-02-13 RX ADMIN — HYDROMORPHONE HYDROCHLORIDE 4 MILLIGRAM(S): 2 INJECTION INTRAMUSCULAR; INTRAVENOUS; SUBCUTANEOUS at 23:00

## 2018-02-13 RX ADMIN — HYDROMORPHONE HYDROCHLORIDE 6 MILLIGRAM(S): 2 INJECTION INTRAMUSCULAR; INTRAVENOUS; SUBCUTANEOUS at 10:33

## 2018-02-13 RX ADMIN — Medication 75 MILLIGRAM(S): at 18:11

## 2018-02-13 RX ADMIN — HYDROMORPHONE HYDROCHLORIDE 202.4 MILLIGRAM(S): 2 INJECTION INTRAMUSCULAR; INTRAVENOUS; SUBCUTANEOUS at 01:42

## 2018-02-13 RX ADMIN — HYDROMORPHONE HYDROCHLORIDE 4 MILLIGRAM(S): 2 INJECTION INTRAMUSCULAR; INTRAVENOUS; SUBCUTANEOUS at 07:49

## 2018-02-13 RX ADMIN — HYDROMORPHONE HYDROCHLORIDE 202.4 MILLIGRAM(S): 2 INJECTION INTRAMUSCULAR; INTRAVENOUS; SUBCUTANEOUS at 18:06

## 2018-02-13 RX ADMIN — METHADONE HYDROCHLORIDE 5 MILLIGRAM(S): 40 TABLET ORAL at 21:32

## 2018-02-13 RX ADMIN — HYDROMORPHONE HYDROCHLORIDE 6 MILLIGRAM(S): 2 INJECTION INTRAMUSCULAR; INTRAVENOUS; SUBCUTANEOUS at 02:06

## 2018-02-13 RX ADMIN — HYDROMORPHONE HYDROCHLORIDE 4 MILLIGRAM(S): 2 INJECTION INTRAMUSCULAR; INTRAVENOUS; SUBCUTANEOUS at 19:16

## 2018-02-13 RX ADMIN — HYDROMORPHONE HYDROCHLORIDE 4 MILLIGRAM(S): 2 INJECTION INTRAMUSCULAR; INTRAVENOUS; SUBCUTANEOUS at 07:34

## 2018-02-13 RX ADMIN — Medication 10 MILLIGRAM(S): at 18:06

## 2018-02-13 RX ADMIN — HYDROMORPHONE HYDROCHLORIDE 4 MILLIGRAM(S): 2 INJECTION INTRAMUSCULAR; INTRAVENOUS; SUBCUTANEOUS at 15:20

## 2018-02-13 RX ADMIN — METHADONE HYDROCHLORIDE 2.5 MILLIGRAM(S): 40 TABLET ORAL at 05:59

## 2018-02-13 RX ADMIN — HYDROMORPHONE HYDROCHLORIDE 4 MILLIGRAM(S): 2 INJECTION INTRAMUSCULAR; INTRAVENOUS; SUBCUTANEOUS at 19:31

## 2018-02-13 RX ADMIN — METHADONE HYDROCHLORIDE 5 MILLIGRAM(S): 40 TABLET ORAL at 14:05

## 2018-02-13 NOTE — PROGRESS NOTE ADULT - SUBJECTIVE AND OBJECTIVE BOX
He had a challenging night with pain  See below for full account        T(C): 37.1 (02-13-18 @ 14:33), Max: 37.3 (02-13-18 @ 05:54)  HR: 885 (02-13-18 @ 14:33) (75 - 885)  BP: 102/60 (02-13-18 @ 14:33) (102/60 - 108/75)  RR: 18 (02-13-18 @ 14:33) (18 - 18)  SpO2: 99% (02-13-18 @ 14:33) (96% - 99%)  Wt(kg): --        CAPILLARY BLOOD GLUCOSE            calcium carbonate 1250 mG + Vitamin D (OsCal 500 + D) 1 Tablet(s) Oral daily  docusate sodium 100 milliGRAM(s) Oral daily  enoxaparin Injectable 40 milliGRAM(s) SubCutaneous daily  HYDROmorphone  Injectable 4 milliGRAM(s) IV Push every 3 hours PRN  HYDROmorphone  Injectable 3 milliGRAM(s) IV Push once PRN  HYDROmorphone  IVPB 6 milliGRAM(s) IV Intermittent <User Schedule>  melatonin 3 milliGRAM(s) Oral at bedtime  methadone    Tablet 5 milliGRAM(s) Oral <User Schedule>  methadone    Tablet 2.5 milliGRAM(s) Oral <User Schedule>  naloxone Injectable 0.1 milliGRAM(s) IV Push every 3 minutes PRN  polyethylene glycol 3350 17 Gram(s) Oral daily PRN  pregabalin 75 milliGRAM(s) Oral two times a day  propranolol 10 milliGRAM(s) Oral every 12 hours  senna 2 Tablet(s) Oral at bedtime          02-12    135  |  99  |  14  ----------------------------<  88  4.3   |  26  |  0.54    Ca    8.3<L>      12 Feb 2018 06:00  Phos  3.1     02-12  Mg     1.9     02-12    TPro  7.8  /  Alb  2.9<L>  /  TBili  0.8  /  DBili  x   /  AST  144<H>  /  ALT  96<H>  /  AlkPhos  125<H>  02-12      Procalc  BNP  ABG                          10.5   8.93  )-----------( 107      ( 12 Feb 2018 06:00 )             32.9           blood and urine cultures                PERTINENT PMH REVIEWED:  [x ] YES [ ] NO           SOCIAL HISTORY:  Significant other/partner:  [x ] YES  [ ] NO            Children:  [x ] YES  [ ] NO                   Islam/Spirituality: TBD  Substance hx:  [x ] YES   [ ] NO           Tobacco hx:  [ x] YES  [ ] NO             Alcohol hx: [x ] YES  [ ] NO        Home Opioid hx:  [x ] YES  [ ] NO   Living Situation: [ ] Home  [ ] Long term care  [ ] Rehab Shelter    REFERRALS:   [ ] Chaplaincy  [ ] Hospice  [ ] Child Life  [ ] Social Work  [ ] Case management [ ] Holistic Therapy     FAMILY HISTORY:  No pertinent family history in first degree relatives    [ x] Family history non contributory     BASELINE ADLs (prior to admission):  Independent [x ] moderately [ ] fully   Dependent   [ ] moderately [ ] fully    ADVANCE DIRECTIVES:  [ ] YES [x ] NO   DNR [ ] YES [x ] NO                      MOLST  [ ] YES [ x] NO    Living Will  [ ] YES [x ] NO    Health Care Proxy [ ] YES  [x ] NO      [x ] Surrogate  [ ] HCP  [ ] Guardian:     Unbefriended, has adult children but no   idea about their whereabouts                                                        Phone#:    Allergies    No Known Allergies    Intolerances               PRESENT SYMPTOMS:  Source: [x ] Patient   [ ] Family   [ ] Team     Pain: [x ] YES [ ] NO  OLDCARTS:  See above    Dyspnea: [x ] YES [ ] NO On exertion  Anxiety: [x ] YES [ ] NO Minimal  Fatigue: [x ] YES [ ] NO   Nausea: [ ] YES [ x] NO  Loss of appetite: [ ] YES [x ] NO   Constipation: [ ] YES [x ] NO     Other Symptoms:  [ x] All other review of systems negative   [ ] Unable to obtain due to poor mentation     Karnofsky Performance Score/Palliative Performance Status Version 2:     30     % due to Fx  Protein Calorie Malutrition:  [ ] Mild   [ ] Moderate   [ ] Severe       Physical Exam:    General: [ x] Alert,  A&O x     [ ] lethargic   [ ] Agitated   [ ] Cachexia   HEENT: [ ] Normal   [ ] Dry mouth   [ ] ET Tube    [ ] Trach   Lungs: [ x] Clear [ ] Rhonchi  [ ] Crackles [ ] Wheezing [ ] Tachypnea  [ ] Audible excessive secretions   Cardiovascular:  [ ] Regular rate and rhythm  [ ] Irregular [ x] Tachycardia   [ ] Bradycardia   Abdomen: [ x] Soft  [ ] Distended  [ ]  [ ] +BS  [ ] Non tender [ x] Tender  [ ]PEG   [ ] NGT   Last BM:     Genitourinary: [x ] Normal [ ] Incontinent   [ ] Oliguria/Anuria   [ ] Knox  Musculoskeletal:  [ ] Normal   [ ] Generalized weakness  [ x] Bedbound   Neurological: [x ] No focal deficits  [ ] Cognitive impairment     Skin: [x ] Normal   [ ] Pressure ulcers     LABS:                           I&O's Summary      RADIOLOGY & ADDITIONAL STUDIES:

## 2018-02-13 NOTE — PROGRESS NOTE ADULT - PROBLEM SELECTOR PLAN 4
stage IV currently unclear source likely due to HCC vs pancreaticobiliary vs lung with elevated ca 19-9 and CEA.  -appreciate Oncology recs; OSH CT imaging uploaded  - await results of IR biopsy, f/u w/ path no current results. stage IV currently unclear source likely due to HCC vs pancreaticobiliary vs lung with elevated ca 19-9 and CEA.  -appreciate Oncology recs; OSH CT imaging uploaded  - await results of IR biopsy, f/u w/ path, no current results.

## 2018-02-13 NOTE — PROGRESS NOTE ADULT - PROBLEM SELECTOR PLAN 6
Improved   I would not recommend benzodiazepines given his methadone use which may put him at risk for significant respiratory depression  Consider non pharmacologic sleep hygiene techniques.

## 2018-02-13 NOTE — PROGRESS NOTE ADULT - SUBJECTIVE AND OBJECTIVE BOX
No acute events overnight. Pain well controlled with pain medications.     Vital Signs Last 24 Hrs  T(C): 37.3 (13 Feb 2018 05:54), Max: 37.4 (12 Feb 2018 14:03)  T(F): 99.1 (13 Feb 2018 05:54), Max: 99.3 (12 Feb 2018 14:03)  HR: 75 (13 Feb 2018 05:54) (75 - 83)  BP: 108/70 (13 Feb 2018 05:54) (105/68 - 113/72)  BP(mean): --  RR: 18 (13 Feb 2018 05:54) (18 - 19)  SpO2: 99% (13 Feb 2018 05:54) (96% - 99%)    Exam:  Gen: NAD  Motor: RLE: EHL/FHL/TA/Gastrocnemius intact, LLE: weak EHL/FHL, FDL intact, GSC/TA intact  Sensory: SILT DP/SP/S/S/T nerve distributions    A/P: 47 year old male with L sacrum/ilium lesion, R acetabular lesion  - Pain Control  - Follow up IR biopsy  - Follow up biopsy from [facility]  - Follow up Rad onc

## 2018-02-13 NOTE — PROGRESS NOTE ADULT - SUBJECTIVE AND OBJECTIVE BOX
Tacos Garcia  PGY 1  Team 1  Pager [phone]    Patient is a 47y old  Male who presents with a chief complaint of hip pain (03 Feb 2018 12:32)      SUBJECTIVE / OVERNIGHT EVENTS:    MEDICATIONS  (STANDING):  calcium carbonate 1250 mG + Vitamin D (OsCal 500 + D) 1 Tablet(s) Oral daily  docusate sodium 100 milliGRAM(s) Oral daily  enoxaparin Injectable 40 milliGRAM(s) SubCutaneous daily  HYDROmorphone  IVPB 6 milliGRAM(s) IV Intermittent <User Schedule>  melatonin 3 milliGRAM(s) Oral at bedtime  methadone    Tablet 5 milliGRAM(s) Oral <User Schedule>  methadone    Tablet 2.5 milliGRAM(s) Oral <User Schedule>  pregabalin 75 milliGRAM(s) Oral two times a day  propranolol 10 milliGRAM(s) Oral every 12 hours  senna 2 Tablet(s) Oral at bedtime    MEDICATIONS  (PRN):  HYDROmorphone  Injectable 4 milliGRAM(s) IV Push every 3 hours PRN Severe Pain (7 - 10)  HYDROmorphone  Injectable 3 milliGRAM(s) IV Push once PRN prior to MRI  naloxone Injectable 0.1 milliGRAM(s) IV Push every 3 minutes PRN Unarouseable or RR <11  polyethylene glycol 3350 17 Gram(s) Oral daily PRN Constipation      Vital Signs Last 24 Hrs  T(C): 37.3 (13 Feb 2018 05:54), Max: 37.4 (12 Feb 2018 14:03)  T(F): 99.1 (13 Feb 2018 05:54), Max: 99.3 (12 Feb 2018 14:03)  HR: 75 (13 Feb 2018 05:54) (75 - 83)  BP: 108/70 (13 Feb 2018 05:54) (105/68 - 113/72)  BP(mean): --  RR: 18 (13 Feb 2018 05:54) (18 - 19)  SpO2: 99% (13 Feb 2018 05:54) (96% - 99%)  CAPILLARY BLOOD GLUCOSE        I&O's Summary      PHYSICAL EXAM:  GENERAL: NAD, well-developed  HEAD:  Atraumatic, Normocephalic  EYES: EOMI, PERRLA, conjunctiva and sclera clear  NECK: Supple, No JVD  CHEST/LUNG: Clear to auscultation bilaterally; No wheeze  HEART: Regular rate and rhythm; No murmurs, rubs, or gallops  ABDOMEN: Soft, Nontender, Nondistended; Bowel sounds present  EXTREMITIES:  2+ Peripheral Pulses, No clubbing, cyanosis, or edema  PSYCH: AAOx3  NEUROLOGY: non-focal  SKIN: No rashes or lesions    LABS:                        10.5   8.93  )-----------( 107      ( 12 Feb 2018 06:00 )             32.9     02-12    135  |  99  |  14  ----------------------------<  88  4.3   |  26  |  0.54    Ca    8.3<L>      12 Feb 2018 06:00  Phos  3.1     02-12  Mg     1.9     02-12    TPro  7.8  /  Alb  2.9<L>  /  TBili  0.8  /  DBili  x   /  AST  144<H>  /  ALT  96<H>  /  AlkPhos  125<H>  02-12              RADIOLOGY & ADDITIONAL TESTS:    Imaging Personally Reviewed:    Consultant(s) Notes Reviewed:      Care Discussed with Consultants/Other Providers: Tacos Garcia  PGY 1  Team 1  Pager [phone]    Patient is a 47y old  Male who presents with a chief complaint of hip pain (03 Feb 2018 12:32)      SUBJECTIVE / OVERNIGHT EVENTS:  Patient states that he is feeling well this morning and "just relaxing". The leg pain is exacerbated with movement.   He denies fever, chills, chest pain, abdominal pain, N/V, diarrhea, dysuria.    MEDICATIONS  (STANDING):  calcium carbonate 1250 mG + Vitamin D (OsCal 500 + D) 1 Tablet(s) Oral daily  docusate sodium 100 milliGRAM(s) Oral daily  enoxaparin Injectable 40 milliGRAM(s) SubCutaneous daily  HYDROmorphone  IVPB 6 milliGRAM(s) IV Intermittent <User Schedule>  melatonin 3 milliGRAM(s) Oral at bedtime  methadone    Tablet 5 milliGRAM(s) Oral <User Schedule>  methadone    Tablet 2.5 milliGRAM(s) Oral <User Schedule>  pregabalin 75 milliGRAM(s) Oral two times a day  propranolol 10 milliGRAM(s) Oral every 12 hours  senna 2 Tablet(s) Oral at bedtime    MEDICATIONS  (PRN):  HYDROmorphone  Injectable 4 milliGRAM(s) IV Push every 3 hours PRN Severe Pain (7 - 10)  HYDROmorphone  Injectable 3 milliGRAM(s) IV Push once PRN prior to MRI  naloxone Injectable 0.1 milliGRAM(s) IV Push every 3 minutes PRN Unarouseable or RR <11  polyethylene glycol 3350 17 Gram(s) Oral daily PRN Constipation      Vital Signs Last 24 Hrs  T(C): 37.3 (13 Feb 2018 05:54), Max: 37.4 (12 Feb 2018 14:03)  T(F): 99.1 (13 Feb 2018 05:54), Max: 99.3 (12 Feb 2018 14:03)  HR: 75 (13 Feb 2018 05:54) (75 - 83)  BP: 108/70 (13 Feb 2018 05:54) (105/68 - 113/72)  BP(mean): --  RR: 18 (13 Feb 2018 05:54) (18 - 19)  SpO2: 99% (13 Feb 2018 05:54) (96% - 99%)  CAPILLARY BLOOD GLUCOSE        I&O's Summary      PHYSICAL EXAM:  Gen: NAD this AM, resting comfortably in bed, eating in bed  HEENT: EOMI, mucous membranes moist, no oropharyngeal exudates  Neck:  no cervical lymphadenopathy  Pulmonary: CTAB, no rhonchi, wheezes or rales  Cardiac: RRR, normal S1S2, no r/m/g  GI:  soft, nontender, nondistended  Extremities: warm, well perfused, no peripheral edema  MSK: LLE ROM limited due to pain  Skin: skin intact, no skin tears or rashes or other lesions    LABS:                        10.5   8.93  )-----------( 107      ( 12 Feb 2018 06:00 )             32.9     02-12    135  |  99  |  14  ----------------------------<  88  4.3   |  26  |  0.54    Ca    8.3<L>      12 Feb 2018 06:00  Phos  3.1     02-12  Mg     1.9     02-12    TPro  7.8  /  Alb  2.9<L>  /  TBili  0.8  /  DBili  x   /  AST  144<H>  /  ALT  96<H>  /  AlkPhos  125<H>  02-12              RADIOLOGY & ADDITIONAL TESTS:    Imaging Personally Reviewed:     Consultant(s) Notes Reviewed: Ortho, Rad Onc, Palliative      Care Discussed with Consultants/Other Providers: Tacos Garcia  PGY 1  Team 1  Pager [phone]    Patient is a 47y old  Male who presents with a chief complaint of hip pain (03 Feb 2018 12:32)      SUBJECTIVE / OVERNIGHT EVENTS:  Patient states that he is feeling well this morning and "just relaxing". The leg pain is exacerbated with movement.   He denies fever, chills, chest pain, abdominal pain, N/V, diarrhea, dysuria.    MEDICATIONS  (STANDING):  calcium carbonate 1250 mG + Vitamin D (OsCal 500 + D) 1 Tablet(s) Oral daily  docusate sodium 100 milliGRAM(s) Oral daily  enoxaparin Injectable 40 milliGRAM(s) SubCutaneous daily  HYDROmorphone  IVPB 6 milliGRAM(s) IV Intermittent <User Schedule>  melatonin 3 milliGRAM(s) Oral at bedtime  methadone    Tablet 5 milliGRAM(s) Oral <User Schedule>  methadone    Tablet 2.5 milliGRAM(s) Oral <User Schedule>  pregabalin 75 milliGRAM(s) Oral two times a day  propranolol 10 milliGRAM(s) Oral every 12 hours  senna 2 Tablet(s) Oral at bedtime    MEDICATIONS  (PRN):  HYDROmorphone  Injectable 4 milliGRAM(s) IV Push every 3 hours PRN Severe Pain (7 - 10)  HYDROmorphone  Injectable 3 milliGRAM(s) IV Push once PRN prior to MRI  naloxone Injectable 0.1 milliGRAM(s) IV Push every 3 minutes PRN Unarouseable or RR <11  polyethylene glycol 3350 17 Gram(s) Oral daily PRN Constipation      Vital Signs Last 24 Hrs  T(C): 37.3 (13 Feb 2018 05:54), Max: 37.4 (12 Feb 2018 14:03)  T(F): 99.1 (13 Feb 2018 05:54), Max: 99.3 (12 Feb 2018 14:03)  HR: 75 (13 Feb 2018 05:54) (75 - 83)  BP: 108/70 (13 Feb 2018 05:54) (105/68 - 113/72)  BP(mean): --  RR: 18 (13 Feb 2018 05:54) (18 - 19)  SpO2: 99% (13 Feb 2018 05:54) (96% - 99%)  CAPILLARY BLOOD GLUCOSE        I&O's Summary      PHYSICAL EXAM:  Gen: NAD this AM, resting comfortably in bed, eating in bed  HEENT: EOMI, mucous membranes moist, no oropharyngeal exudates  Neck:  no cervical lymphadenopathy  Pulmonary: CTAB, no rhonchi, wheezes or rales  Cardiac: RRR, normal S1S2, no r/m/g  GI:  soft, nontender, nondistended  Extremities: warm, well perfused, no peripheral edema  MSK: LLE ROM limited due to pain  Skin: skin intact, no skin tears or rashes or other lesions    LABS:                        10.5   8.93  )-----------( 107      ( 12 Feb 2018 06:00 )             32.9     02-12    135  |  99  |  14  ----------------------------<  88  4.3   |  26  |  0.54    Ca    8.3<L>      12 Feb 2018 06:00  Phos  3.1     02-12  Mg     1.9     02-12    TPro  7.8  /  Alb  2.9<L>  /  TBili  0.8  /  DBili  x   /  AST  144<H>  /  ALT  96<H>  /  AlkPhos  125<H>  02-12              RADIOLOGY & ADDITIONAL TESTS:    Imaging Personally Reviewed:     Consultant(s) Notes Reviewed: Ortho, Rad Onc, Palliative      Care Discussed with Consultants/Other Providers: Onc, Palliative

## 2018-02-13 NOTE — PROGRESS NOTE ADULT - SUBJECTIVE AND OBJECTIVE BOX
We were asked to re-evaluate Mr. Han as a candidate for radiation treatment today.  The records in his physical chart document pathology stating: a pelvic mass, focally positive for hepatocyte, AFP, CK20, CDX-2 , rare cells positive,  the findings suggest pacreaticobiliary or lung primary, however other sites cannot be ruled out.   A biopsy performed in house was done 2/8/18 since the above pathology from [facility] was inconclusive as to a primary and this pathology is still pending.   The patient was asked about specific sites of pain and he states  "it's all over."  I told him some of his imaging findings and that we would possible consider him a candidate for radiation to a specific site of pain causing symptoms that are consistent with imaging and after his final biopsy report is available.    Mr. Han did not agree to have any radiation treatment we might offer at this point despite having pain in various sites of his body and despite informing him that imaging from [facility] demonstrated a fracture to his left iliac bone and right acetabulum.   He insisted on further discussion with his team, and is awaiting biopsy results, and refuses any radiation treatment now.

## 2018-02-13 NOTE — PROGRESS NOTE ADULT - PROBLEM SELECTOR PLAN 1
left ilium, right acetabulum, and left sacrum in setting of newly diagnosed malignancy. CT findings c/w metastatic dz - primary to be determined  - outside path suggestive of pancreaticobiliary or lung primary (though necrotic tissue with  - s/p biopsy via IR on 2/8/18 f/u results to determine tx option from ortho standpoint  - c/w hydromorphone 6mg qd (02:00, 10:00, 18:00) yesterday and will continue methadone 2.5/5/5mg  w/ hydromorphone 4mg q3h PRN for severe pain; pregabalin also added  - c/w bowel regimen due to opioid induced constipation  - f/u MR femur and MRI pelvis as per ortho recs  - appreciate ortho/ rad/onc input - potential candidate for radiation therapy, to be coordinated depending on biopsy result. left ilium, right acetabulum, and left sacrum in setting of newly diagnosed malignancy. CT findings c/w metastatic dz - primary to be determined  - outside path suggestive of pancreaticobiliary or lung primary (though necrotic tissue with  - s/p biopsy via IR on 2/8/18 f/u results to determine tx option from ortho standpoint  - c/w hydromorphone 6mg qd and will continue methadone 2.5/5/5mg  w/ hydromorphone 4mg q3h PRN for severe pain; pregabalin also added  -f/u palliative for pain management  - c/w bowel regimen due to opioid induced constipation  - f/u MR femur and MRI pelvis as per ortho recs  - appreciate ortho/ rad/onc input - potential candidate for radiation therapy, to be coordinated depending on biopsy result.  -Per Rad Onc patient does not currently want radiation, awaiting biopsy results.

## 2018-02-13 NOTE — PROGRESS NOTE ADULT - PROBLEM SELECTOR PLAN 1
Maintain ATC methadone 2.5/5/5 q8H  Increase ATC IVPB  to dilaudid 8mg q8H interspersed  Will likely begin uptitration tomorrow   Maintain IV dilaudid 4mg q3H with stipulations relative to the timing of ATC medications  If pain Rx is needed early consider contacting palliative care to determine if doses could be given early  Any dose beyond that must be IVPB/in NS  Avoid benzodiazepines given current methadone use

## 2018-02-13 NOTE — PROGRESS NOTE ADULT - ATTENDING COMMENTS
Patient seen and examined, d/w HS, agree with above.     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 Patient had declined RT earlier today, appreciate rad/onc assistance, will readdress. D/w palliative Dr. Tobin and Onc Dr. Singh, appreciate palliative assistance with pain control, patient homeless with poor social support, may not be a candidate for chemotherapy, ultimate dispo may be going to shelter with hospice....

## 2018-02-13 NOTE — PROGRESS NOTE ADULT - PROBLEM SELECTOR PLAN 2
As per outside records, positive IgG. Complicated by liver cirrhosis, esophageal varices, portal HTN. Outside labs show thrombocytopenia, transaminitis, and elevated alk phos, synthetic function preserved (INR wnl). HCV genotype 1a  - Workup per hepatology pending, defer treatment for HCV at this time given malignancy workup in progress

## 2018-02-14 DIAGNOSIS — C80.1 MALIGNANT (PRIMARY) NEOPLASM, UNSPECIFIED: ICD-10-CM

## 2018-02-14 LAB
GAS PNL BLDMV: SIGNIFICANT CHANGE UP
GAS PNL BLDMV: SIGNIFICANT CHANGE UP

## 2018-02-14 PROCEDURE — 99232 SBSQ HOSP IP/OBS MODERATE 35: CPT | Mod: GC

## 2018-02-14 PROCEDURE — 99233 SBSQ HOSP IP/OBS HIGH 50: CPT

## 2018-02-14 PROCEDURE — 99233 SBSQ HOSP IP/OBS HIGH 50: CPT | Mod: GC

## 2018-02-14 RX ORDER — METHADONE HYDROCHLORIDE 40 MG/1
5 TABLET ORAL
Qty: 0 | Refills: 0 | Status: DISCONTINUED | OUTPATIENT
Start: 2018-02-14 | End: 2018-02-16

## 2018-02-14 RX ORDER — HYDROMORPHONE HYDROCHLORIDE 2 MG/ML
8 INJECTION INTRAMUSCULAR; INTRAVENOUS; SUBCUTANEOUS
Qty: 0 | Refills: 0 | Status: DISCONTINUED | OUTPATIENT
Start: 2018-02-14 | End: 2018-02-16

## 2018-02-14 RX ORDER — METHADONE HYDROCHLORIDE 40 MG/1
7.5 TABLET ORAL
Qty: 0 | Refills: 0 | Status: DISCONTINUED | OUTPATIENT
Start: 2018-02-14 | End: 2018-02-16

## 2018-02-14 RX ADMIN — Medication 75 MILLIGRAM(S): at 19:27

## 2018-02-14 RX ADMIN — Medication 10 MILLIGRAM(S): at 19:27

## 2018-02-14 RX ADMIN — Medication 10 MILLIGRAM(S): at 05:20

## 2018-02-14 RX ADMIN — HYDROMORPHONE HYDROCHLORIDE 4 MILLIGRAM(S): 2 INJECTION INTRAMUSCULAR; INTRAVENOUS; SUBCUTANEOUS at 20:50

## 2018-02-14 RX ADMIN — Medication 3 MILLIGRAM(S): at 22:47

## 2018-02-14 RX ADMIN — HYDROMORPHONE HYDROCHLORIDE 4 MILLIGRAM(S): 2 INJECTION INTRAMUSCULAR; INTRAVENOUS; SUBCUTANEOUS at 11:18

## 2018-02-14 RX ADMIN — HYDROMORPHONE HYDROCHLORIDE 4 MILLIGRAM(S): 2 INJECTION INTRAMUSCULAR; INTRAVENOUS; SUBCUTANEOUS at 17:31

## 2018-02-14 RX ADMIN — HYDROMORPHONE HYDROCHLORIDE 4 MILLIGRAM(S): 2 INJECTION INTRAMUSCULAR; INTRAVENOUS; SUBCUTANEOUS at 20:35

## 2018-02-14 RX ADMIN — HYDROMORPHONE HYDROCHLORIDE 4 MILLIGRAM(S): 2 INJECTION INTRAMUSCULAR; INTRAVENOUS; SUBCUTANEOUS at 02:47

## 2018-02-14 RX ADMIN — SENNA PLUS 2 TABLET(S): 8.6 TABLET ORAL at 22:47

## 2018-02-14 RX ADMIN — HYDROMORPHONE HYDROCHLORIDE 203.2 MILLIGRAM(S): 2 INJECTION INTRAMUSCULAR; INTRAVENOUS; SUBCUTANEOUS at 22:46

## 2018-02-14 RX ADMIN — ENOXAPARIN SODIUM 40 MILLIGRAM(S): 100 INJECTION SUBCUTANEOUS at 17:17

## 2018-02-14 RX ADMIN — HYDROMORPHONE HYDROCHLORIDE 8 MILLIGRAM(S): 2 INJECTION INTRAMUSCULAR; INTRAVENOUS; SUBCUTANEOUS at 15:46

## 2018-02-14 RX ADMIN — Medication 1 TABLET(S): at 17:17

## 2018-02-14 RX ADMIN — HYDROMORPHONE HYDROCHLORIDE 4 MILLIGRAM(S): 2 INJECTION INTRAMUSCULAR; INTRAVENOUS; SUBCUTANEOUS at 02:32

## 2018-02-14 RX ADMIN — HYDROMORPHONE HYDROCHLORIDE 4 MILLIGRAM(S): 2 INJECTION INTRAMUSCULAR; INTRAVENOUS; SUBCUTANEOUS at 07:09

## 2018-02-14 RX ADMIN — HYDROMORPHONE HYDROCHLORIDE 202.4 MILLIGRAM(S): 2 INJECTION INTRAMUSCULAR; INTRAVENOUS; SUBCUTANEOUS at 01:02

## 2018-02-14 RX ADMIN — Medication 100 MILLIGRAM(S): at 17:17

## 2018-02-14 RX ADMIN — HYDROMORPHONE HYDROCHLORIDE 6 MILLIGRAM(S): 2 INJECTION INTRAMUSCULAR; INTRAVENOUS; SUBCUTANEOUS at 01:30

## 2018-02-14 RX ADMIN — Medication 75 MILLIGRAM(S): at 05:20

## 2018-02-14 RX ADMIN — HYDROMORPHONE HYDROCHLORIDE 202.4 MILLIGRAM(S): 2 INJECTION INTRAMUSCULAR; INTRAVENOUS; SUBCUTANEOUS at 10:14

## 2018-02-14 RX ADMIN — HYDROMORPHONE HYDROCHLORIDE 6 MILLIGRAM(S): 2 INJECTION INTRAMUSCULAR; INTRAVENOUS; SUBCUTANEOUS at 10:29

## 2018-02-14 RX ADMIN — HYDROMORPHONE HYDROCHLORIDE 4 MILLIGRAM(S): 2 INJECTION INTRAMUSCULAR; INTRAVENOUS; SUBCUTANEOUS at 11:33

## 2018-02-14 RX ADMIN — HYDROMORPHONE HYDROCHLORIDE 8 MILLIGRAM(S): 2 INJECTION INTRAMUSCULAR; INTRAVENOUS; SUBCUTANEOUS at 23:01

## 2018-02-14 RX ADMIN — METHADONE HYDROCHLORIDE 7.5 MILLIGRAM(S): 40 TABLET ORAL at 19:27

## 2018-02-14 RX ADMIN — METHADONE HYDROCHLORIDE 2.5 MILLIGRAM(S): 40 TABLET ORAL at 05:20

## 2018-02-14 RX ADMIN — HYDROMORPHONE HYDROCHLORIDE 203.2 MILLIGRAM(S): 2 INJECTION INTRAMUSCULAR; INTRAVENOUS; SUBCUTANEOUS at 15:31

## 2018-02-14 RX ADMIN — HYDROMORPHONE HYDROCHLORIDE 4 MILLIGRAM(S): 2 INJECTION INTRAMUSCULAR; INTRAVENOUS; SUBCUTANEOUS at 17:46

## 2018-02-14 RX ADMIN — HYDROMORPHONE HYDROCHLORIDE 4 MILLIGRAM(S): 2 INJECTION INTRAMUSCULAR; INTRAVENOUS; SUBCUTANEOUS at 07:24

## 2018-02-14 NOTE — PROGRESS NOTE ADULT - PROBLEM SELECTOR PLAN 5
Bx : immunophenotype supports the diagnosis of poorly  differentiated carcinoma but does not identify the primary site

## 2018-02-14 NOTE — PROGRESS NOTE ADULT - SUBJECTIVE AND OBJECTIVE BOX
pt being followed by hepatology for cirrhosis due to Hep C and pelvic mass    Interval Events: Pt c/o severe pain all over and being followed by palliative care    Allergies:  No Known Allergies      Hospital Medications:  calcium carbonate 1250 mG + Vitamin D (OsCal 500 + D) 1 Tablet(s) Oral daily  docusate sodium 100 milliGRAM(s) Oral daily  enoxaparin Injectable 40 milliGRAM(s) SubCutaneous daily  HYDROmorphone  Injectable 4 milliGRAM(s) IV Push every 3 hours PRN  HYDROmorphone  Injectable 3 milliGRAM(s) IV Push once PRN  HYDROmorphone  IVPB 6 milliGRAM(s) IV Intermittent <User Schedule>  melatonin 3 milliGRAM(s) Oral at bedtime  methadone    Tablet 5 milliGRAM(s) Oral <User Schedule>  methadone    Tablet 2.5 milliGRAM(s) Oral <User Schedule>  naloxone Injectable 0.1 milliGRAM(s) IV Push every 3 minutes PRN  polyethylene glycol 3350 17 Gram(s) Oral daily PRN  pregabalin 75 milliGRAM(s) Oral two times a day  propranolol 10 milliGRAM(s) Oral every 12 hours  senna 2 Tablet(s) Oral at bedtime      PMHX/PSHX:  IV drug abuse  Hepatitis C  No significant past surgical history      ROS:     General:  No  fevers, chills, night sweats.  Eyes:  Good vision, no reported pain  ENT:  No sore throat, pain, runny nose, dysphagia  CV:  No pain, palpitations, hypo/hypertension  Resp:  No dyspnea, cough, tachypnea, wheezing  GI:  See HPI  :  No pain, bleeding, incontinence, nocturia  Muscle:  see HPI  Neuro:  No focal weakness.  Skin:  No rash, edema      PHYSICAL EXAM:     GENERAL: No distress  HEENT:  NC/AT,  conjunctivae clear, sclera -anicteric  CHEST: breath sounds clear  HEART:  Regular rhythm, S1, S2, no added sounds  ABDOMEN:  Soft, non-tender, non-distended, normoactive bowel sounds  NEURO:  Alert, oriented, non focal    Vital Signs:  Vital Signs Last 24 Hrs  T(C): 37.1 (14 Feb 2018 05:19), Max: 37.1 (13 Feb 2018 14:33)  T(F): 98.8 (14 Feb 2018 05:19), Max: 98.8 (14 Feb 2018 05:19)  HR: 85 (14 Feb 2018 05:19) (78 - 89)  BP: 116/88 (14 Feb 2018 05:19) (102/60 - 116/88)  BP(mean): --  RR: 18 (14 Feb 2018 05:19) (18 - 18)  SpO2: 97% (14 Feb 2018 05:19) (97% - 99%)  Daily     Daily     Path : Positive for malignant cells, favors poorly differentiated adenoCA, site not determined, pending immunostain.        Imaging : reviewed all imaging results form [facility]

## 2018-02-14 NOTE — PROGRESS NOTE ADULT - SUBJECTIVE AND OBJECTIVE BOX
Pain regimen changed    Pain is suboptimally controlled, worsened post BM attempt          T(C): 37.1 (02-14-18 @ 05:19), Max: 37.1 (02-14-18 @ 05:19)  HR: 85 (02-14-18 @ 05:19) (78 - 89)  BP: 116/88 (02-14-18 @ 05:19) (108/70 - 116/88)  RR: 18 (02-14-18 @ 05:19) (18 - 18)  SpO2: 97% (02-14-18 @ 05:19) (97% - 97%)  Wt(kg): --        CAPILLARY BLOOD GLUCOSE            calcium carbonate 1250 mG + Vitamin D (OsCal 500 + D) 1 Tablet(s) Oral daily  docusate sodium 100 milliGRAM(s) Oral daily  enoxaparin Injectable 40 milliGRAM(s) SubCutaneous daily  HYDROmorphone  Injectable 4 milliGRAM(s) IV Push every 3 hours PRN  HYDROmorphone  Injectable 3 milliGRAM(s) IV Push once PRN  HYDROmorphone  IVPB 8 milliGRAM(s) IV Intermittent <User Schedule>  melatonin 3 milliGRAM(s) Oral at bedtime  methadone    Tablet 5 milliGRAM(s) Oral <User Schedule>  methadone    Tablet 7.5 milliGRAM(s) Oral <User Schedule>  naloxone Injectable 0.1 milliGRAM(s) IV Push every 3 minutes PRN  polyethylene glycol 3350 17 Gram(s) Oral daily PRN  pregabalin 75 milliGRAM(s) Oral two times a day  propranolol 10 milliGRAM(s) Oral every 12 hours  senna 2 Tablet(s) Oral at bedtime                  Procalc  BNP  ABG              blood and urine cultures                  PERTINENT PMH REVIEWED:  [x ] YES [ ] NO           SOCIAL HISTORY:  Significant other/partner:  [x ] YES  [ ] NO            Children:  [x ] YES  [ ] NO                   Rastafari/Spirituality: TBD  Substance hx:  [x ] YES   [ ] NO           Tobacco hx:  [ x] YES  [ ] NO             Alcohol hx: [x ] YES  [ ] NO        Home Opioid hx:  [x ] YES  [ ] NO   Living Situation: [ ] Home  [ ] Long term care  [ ] Rehab Shelter    REFERRALS:   [ ] Chaplaincy  [ ] Hospice  [ ] Child Life  [ ] Social Work  [ ] Case management [ ] Holistic Therapy     FAMILY HISTORY:  No pertinent family history in first degree relatives    [ x] Family history non contributory     BASELINE ADLs (prior to admission):  Independent [x ] moderately [ ] fully   Dependent   [ ] moderately [ ] fully    ADVANCE DIRECTIVES:  [ ] YES [x ] NO   DNR [ ] YES [x ] NO                      MOLST  [ ] YES [ x] NO    Living Will  [ ] YES [x ] NO    Health Care Proxy [ ] YES  [x ] NO      [x ] Surrogate  [ ] HCP  [ ] Guardian:     Unbefriended, has adult children but no   idea about their whereabouts                                                        Phone#:    Allergies    No Known Allergies    Intolerances               PRESENT SYMPTOMS:  Source: [x ] Patient   [ ] Family   [ ] Team     Pain: [x ] YES [ ] NO  OLDCARTS:  See above    Dyspnea: [x ] YES [ ] NO On exertion  Anxiety: [x ] YES [ ] NO Minimal  Fatigue: [x ] YES [ ] NO   Nausea: [ ] YES [ x] NO  Loss of appetite: [ ] YES [x ] NO   Constipation: [ ] YES [x ] NO     Other Symptoms:  [ x] All other review of systems negative   [ ] Unable to obtain due to poor mentation     Karnofsky Performance Score/Palliative Performance Status Version 2:     30     % due to Fx  Protein Calorie Malutrition:  [ ] Mild   [ ] Moderate   [ ] Severe       Physical Exam:    General: [ x] Alert,  A&O x     [ ] lethargic   [ ] Agitated   [ ] Cachexia   HEENT: [ ] Normal   [ ] Dry mouth   [ ] ET Tube    [ ] Trach   Lungs: [ x] Clear [ ] Rhonchi  [ ] Crackles [ ] Wheezing [ ] Tachypnea  [ ] Audible excessive secretions   Cardiovascular:  [ ] Regular rate and rhythm  [ ] Irregular [ x] Tachycardia   [ ] Bradycardia   Abdomen: [ x] Soft  [ ] Distended  [ ]  [ ] +BS  [ ] Non tender [ x] Tender  [ ]PEG   [ ] NGT   Last BM:     Genitourinary: [x ] Normal [ ] Incontinent   [ ] Oliguria/Anuria   [ ] [place]  Musculoskeletal:  [ ] Normal   [ ] Generalized weakness  [ x] Bedbound   Neurological: [x ] No focal deficits  [ ] Cognitive impairment     Skin: [x ] Normal   [ ] Pressure ulcers     LABS:                           I&O's Summary      RADIOLOGY & ADDITIONAL STUDIES:

## 2018-02-14 NOTE — PROGRESS NOTE ADULT - ASSESSMENT
Impression:  1) Cirrhosis most likely chronic HCV. MELD-Na Score of 8. Reviewed imaging from [facility]  2) HCV - treatment naive  3) Liver lesion - indeterminate on MRI liver protocol in [facility]  4) Metastases - s/p pelvic mass bx favors poorly differentiated  adeno CA, site undetermined, immunostain pending  5) Pelvic mass with pathologic Fractures, Orthopedic and Rad Onc on board    Plan:  - F/u IR bx final results  - Monitor liver tests  - Limit hepatotoxic agents  - Would defer HCV treatment at this time given malignancy workup in progress.  - Can consider doing EGD and colonoscopy to rule out occult GI malignancy if it changes management  - Follow up with Oncology. Impression:  1) Cirrhosis most likely chronic HCV. MELD-Na Score of 8. Reviewed imaging from [facility]  2) HCV - treatment naive  3) Liver lesion - indeterminate on MRI liver protocol in [facility]  4) Metastases - s/p pelvic mass bx favors poorly differentiated  adeno CA, site undetermined, immunostain pending  5) Pelvic mass with pathologic Fractures, Orthopedic and Rad Onc on board    Plan:  - F/u IR bx final results  - Monitor liver tests  - Limit hepatotoxic agents  - Would defer HCV treatment at this time given malignancy workup in progress.  - Can consider doing EGD and colonoscopy tomorrow to rule out occult GI malignancy if it changes management  - Follow up with Oncology.

## 2018-02-14 NOTE — PROGRESS NOTE ADULT - SUBJECTIVE AND OBJECTIVE BOX
Tacos Garcia  PGY 1  Team 1  Pager [phone]    Patient is a 47y old  Male who presents with a chief complaint of hip pain (03 Feb 2018 12:32)      SUBJECTIVE / OVERNIGHT EVENTS:    MEDICATIONS  (STANDING):  calcium carbonate 1250 mG + Vitamin D (OsCal 500 + D) 1 Tablet(s) Oral daily  docusate sodium 100 milliGRAM(s) Oral daily  enoxaparin Injectable 40 milliGRAM(s) SubCutaneous daily  HYDROmorphone  IVPB 6 milliGRAM(s) IV Intermittent <User Schedule>  melatonin 3 milliGRAM(s) Oral at bedtime  methadone    Tablet 5 milliGRAM(s) Oral <User Schedule>  methadone    Tablet 2.5 milliGRAM(s) Oral <User Schedule>  pregabalin 75 milliGRAM(s) Oral two times a day  propranolol 10 milliGRAM(s) Oral every 12 hours  senna 2 Tablet(s) Oral at bedtime    MEDICATIONS  (PRN):  HYDROmorphone  Injectable 4 milliGRAM(s) IV Push every 3 hours PRN Severe Pain (7 - 10)  HYDROmorphone  Injectable 3 milliGRAM(s) IV Push once PRN prior to MRI  naloxone Injectable 0.1 milliGRAM(s) IV Push every 3 minutes PRN Unarouseable or RR <11  polyethylene glycol 3350 17 Gram(s) Oral daily PRN Constipation      Vital Signs Last 24 Hrs  T(C): 37.1 (14 Feb 2018 05:19), Max: 37.1 (13 Feb 2018 14:33)  T(F): 98.8 (14 Feb 2018 05:19), Max: 98.8 (14 Feb 2018 05:19)  HR: 85 (14 Feb 2018 05:19) (78 - 89)  BP: 116/88 (14 Feb 2018 05:19) (102/60 - 116/88)  BP(mean): --  RR: 18 (14 Feb 2018 05:19) (18 - 18)  SpO2: 97% (14 Feb 2018 05:19) (97% - 99%)  CAPILLARY BLOOD GLUCOSE        I&O's Summary      PHYSICAL EXAM:  GENERAL: NAD, well-developed  HEAD:  Atraumatic, Normocephalic  EYES: EOMI, PERRLA, conjunctiva and sclera clear  NECK: Supple, No JVD  CHEST/LUNG: Clear to auscultation bilaterally; No wheeze  HEART: Regular rate and rhythm; No murmurs, rubs, or gallops  ABDOMEN: Soft, Nontender, Nondistended; Bowel sounds present  EXTREMITIES:  2+ Peripheral Pulses, No clubbing, cyanosis, or edema  PSYCH: AAOx3  NEUROLOGY: non-focal  SKIN: No rashes or lesions    LABS:                    RADIOLOGY & ADDITIONAL TESTS:    Imaging Personally Reviewed:    Consultant(s) Notes Reviewed:      Care Discussed with Consultants/Other Providers: Tacos Garcia  PGY 1  Team 1  Pager [phone]    Patient is a 47y old  Male who presents with a chief complaint of hip pain (03 Feb 2018 12:32)      SUBJECTIVE / OVERNIGHT EVENTS:  Patient states that he continues to have leg pain, which has improved on current pain management regimen.   He denied going for MRI yesterday due to being worried about pain from the procedure.   He denies fever, chills, chest pain, abdominal pain, n/v, diarrhea, dysuria.    MEDICATIONS  (STANDING):  calcium carbonate 1250 mG + Vitamin D (OsCal 500 + D) 1 Tablet(s) Oral daily  docusate sodium 100 milliGRAM(s) Oral daily  enoxaparin Injectable 40 milliGRAM(s) SubCutaneous daily  HYDROmorphone  IVPB 6 milliGRAM(s) IV Intermittent <User Schedule>  melatonin 3 milliGRAM(s) Oral at bedtime  methadone    Tablet 5 milliGRAM(s) Oral <User Schedule>  methadone    Tablet 2.5 milliGRAM(s) Oral <User Schedule>  pregabalin 75 milliGRAM(s) Oral two times a day  propranolol 10 milliGRAM(s) Oral every 12 hours  senna 2 Tablet(s) Oral at bedtime    MEDICATIONS  (PRN):  HYDROmorphone  Injectable 4 milliGRAM(s) IV Push every 3 hours PRN Severe Pain (7 - 10)  HYDROmorphone  Injectable 3 milliGRAM(s) IV Push once PRN prior to MRI  naloxone Injectable 0.1 milliGRAM(s) IV Push every 3 minutes PRN Unarouseable or RR <11  polyethylene glycol 3350 17 Gram(s) Oral daily PRN Constipation      Vital Signs Last 24 Hrs  T(C): 37.1 (14 Feb 2018 05:19), Max: 37.1 (13 Feb 2018 14:33)  T(F): 98.8 (14 Feb 2018 05:19), Max: 98.8 (14 Feb 2018 05:19)  HR: 85 (14 Feb 2018 05:19) (78 - 89)  BP: 116/88 (14 Feb 2018 05:19) (102/60 - 116/88)  BP(mean): --  RR: 18 (14 Feb 2018 05:19) (18 - 18)  SpO2: 97% (14 Feb 2018 05:19) (97% - 99%)  CAPILLARY BLOOD GLUCOSE        I&O's Summary      PHYSICAL EXAM:  Gen: NAD this AM, resting comfortably in bed, eating in bed  HEENT: EOMI, mucous membranes moist, no oropharyngeal exudates  Neck:  no cervical lymphadenopathy  Pulmonary: CTAB, no rhonchi, wheezes or rales  Cardiac: RRR, normal S1S2, no r/m/g  GI:  soft, nontender, nondistended  Extremities: cachetic legs, warm, well perfused, no peripheral edema  MSK: LLE ROM limited due to pain decreased more on R than L  Skin: skin intact, no skin tears or rashes or other lesions    LABS:                    RADIOLOGY & ADDITIONAL TESTS:    Imaging Personally Reviewed:    Consultant(s) Notes Reviewed:      Care Discussed with Consultants/Other Providers:

## 2018-02-14 NOTE — PROGRESS NOTE ADULT - PROBLEM SELECTOR PLAN 1
ATC methadone 7.5/5   Increase ATC Will likely begin uptitration tomorrow   Maintain IV dilaudid 4mg q3H with stipulations relative to the timing of ATC medications  If pain Rx is needed early consider contacting palliative care to determine if doses could be given early    Any dose beyond that must be IVPB/in NS    Avoid benzodiazepines given current methadone use

## 2018-02-14 NOTE — PROGRESS NOTE ADULT - ATTENDING COMMENTS
Patient seen and examined, d/w HS, agree with above.     Primary site unknown, continuing discussion on whether patient is candidate for disease modifying therapy (patient homeless with poor social support, may not be a candidate for chemotherapy, ultimate dispo may be going to shelter with hospice vs [facility]?).     Appreciate palliative assistance with pain management, emotional support provided. Patient accepted offer to speak with chaplaincy.

## 2018-02-14 NOTE — PROGRESS NOTE ADULT - PROBLEM SELECTOR PLAN 1
left ilium, right acetabulum, and left sacrum in setting of newly diagnosed malignancy. CT findings c/w metastatic dz - primary to be determined  - outside path suggestive of pancreaticobiliary or lung primary (though necrotic tissue with  - c/w hydromorphone 8mg qd and will continue methadone 7.5/5mg  w/ hydromorphone 4mg q3h PRN for severe pain; pregabalin also added  -f/u palliative for pain management  - c/w bowel regimen due to opioid induced constipation  - f/u MR femur and MRI pelvis as per ortho recs, however patient has been declining past several attempts, has extra pain medication to take w/  - appreciate ortho/ rad/onc input - potential candidate for radiation therapy, to be coordinated depending on biopsy result.  -Per Rad Onc patient does not currently want radiation, awaiting biopsy results.

## 2018-02-14 NOTE — PROGRESS NOTE ADULT - PROBLEM SELECTOR PLAN 2
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  -Await stains for further classification.  -Patient likely poor chemotherapy candidate due to spread of disease, will discuss with onc about next step.

## 2018-02-15 PROCEDURE — 99233 SBSQ HOSP IP/OBS HIGH 50: CPT | Mod: GC

## 2018-02-15 PROCEDURE — 99233 SBSQ HOSP IP/OBS HIGH 50: CPT

## 2018-02-15 RX ADMIN — Medication 100 MILLIGRAM(S): at 11:29

## 2018-02-15 RX ADMIN — HYDROMORPHONE HYDROCHLORIDE 203.2 MILLIGRAM(S): 2 INJECTION INTRAMUSCULAR; INTRAVENOUS; SUBCUTANEOUS at 16:13

## 2018-02-15 RX ADMIN — HYDROMORPHONE HYDROCHLORIDE 8 MILLIGRAM(S): 2 INJECTION INTRAMUSCULAR; INTRAVENOUS; SUBCUTANEOUS at 16:28

## 2018-02-15 RX ADMIN — HYDROMORPHONE HYDROCHLORIDE 4 MILLIGRAM(S): 2 INJECTION INTRAMUSCULAR; INTRAVENOUS; SUBCUTANEOUS at 02:02

## 2018-02-15 RX ADMIN — METHADONE HYDROCHLORIDE 5 MILLIGRAM(S): 40 TABLET ORAL at 05:46

## 2018-02-15 RX ADMIN — HYDROMORPHONE HYDROCHLORIDE 4 MILLIGRAM(S): 2 INJECTION INTRAMUSCULAR; INTRAVENOUS; SUBCUTANEOUS at 11:51

## 2018-02-15 RX ADMIN — HYDROMORPHONE HYDROCHLORIDE 4 MILLIGRAM(S): 2 INJECTION INTRAMUSCULAR; INTRAVENOUS; SUBCUTANEOUS at 07:51

## 2018-02-15 RX ADMIN — HYDROMORPHONE HYDROCHLORIDE 4 MILLIGRAM(S): 2 INJECTION INTRAMUSCULAR; INTRAVENOUS; SUBCUTANEOUS at 07:21

## 2018-02-15 RX ADMIN — HYDROMORPHONE HYDROCHLORIDE 4 MILLIGRAM(S): 2 INJECTION INTRAMUSCULAR; INTRAVENOUS; SUBCUTANEOUS at 18:19

## 2018-02-15 RX ADMIN — Medication 75 MILLIGRAM(S): at 17:29

## 2018-02-15 RX ADMIN — HYDROMORPHONE HYDROCHLORIDE 203.2 MILLIGRAM(S): 2 INJECTION INTRAMUSCULAR; INTRAVENOUS; SUBCUTANEOUS at 22:05

## 2018-02-15 RX ADMIN — HYDROMORPHONE HYDROCHLORIDE 4 MILLIGRAM(S): 2 INJECTION INTRAMUSCULAR; INTRAVENOUS; SUBCUTANEOUS at 11:36

## 2018-02-15 RX ADMIN — HYDROMORPHONE HYDROCHLORIDE 4 MILLIGRAM(S): 2 INJECTION INTRAMUSCULAR; INTRAVENOUS; SUBCUTANEOUS at 18:34

## 2018-02-15 RX ADMIN — HYDROMORPHONE HYDROCHLORIDE 4 MILLIGRAM(S): 2 INJECTION INTRAMUSCULAR; INTRAVENOUS; SUBCUTANEOUS at 01:47

## 2018-02-15 RX ADMIN — HYDROMORPHONE HYDROCHLORIDE 8 MILLIGRAM(S): 2 INJECTION INTRAMUSCULAR; INTRAVENOUS; SUBCUTANEOUS at 04:31

## 2018-02-15 RX ADMIN — HYDROMORPHONE HYDROCHLORIDE 4 MILLIGRAM(S): 2 INJECTION INTRAMUSCULAR; INTRAVENOUS; SUBCUTANEOUS at 14:55

## 2018-02-15 RX ADMIN — HYDROMORPHONE HYDROCHLORIDE 4 MILLIGRAM(S): 2 INJECTION INTRAMUSCULAR; INTRAVENOUS; SUBCUTANEOUS at 15:10

## 2018-02-15 RX ADMIN — Medication 1 TABLET(S): at 11:29

## 2018-02-15 RX ADMIN — HYDROMORPHONE HYDROCHLORIDE 203.2 MILLIGRAM(S): 2 INJECTION INTRAMUSCULAR; INTRAVENOUS; SUBCUTANEOUS at 10:04

## 2018-02-15 RX ADMIN — HYDROMORPHONE HYDROCHLORIDE 8 MILLIGRAM(S): 2 INJECTION INTRAMUSCULAR; INTRAVENOUS; SUBCUTANEOUS at 10:21

## 2018-02-15 RX ADMIN — HYDROMORPHONE HYDROCHLORIDE 203.2 MILLIGRAM(S): 2 INJECTION INTRAMUSCULAR; INTRAVENOUS; SUBCUTANEOUS at 04:16

## 2018-02-15 RX ADMIN — HYDROMORPHONE HYDROCHLORIDE 8 MILLIGRAM(S): 2 INJECTION INTRAMUSCULAR; INTRAVENOUS; SUBCUTANEOUS at 22:20

## 2018-02-15 RX ADMIN — Medication 3 MILLIGRAM(S): at 22:05

## 2018-02-15 RX ADMIN — Medication 10 MILLIGRAM(S): at 17:28

## 2018-02-15 RX ADMIN — METHADONE HYDROCHLORIDE 7.5 MILLIGRAM(S): 40 TABLET ORAL at 17:28

## 2018-02-15 RX ADMIN — Medication 75 MILLIGRAM(S): at 05:46

## 2018-02-15 RX ADMIN — ENOXAPARIN SODIUM 40 MILLIGRAM(S): 100 INJECTION SUBCUTANEOUS at 11:29

## 2018-02-15 NOTE — PROGRESS NOTE ADULT - SUBJECTIVE AND OBJECTIVE BOX
INTERVAL HPI/OVERNIGHT EVENTS:  Patient S&E at bedside. No o/n events,     VITAL SIGNS:  T(F): 98.9 (02-15-18 @ 14:30)  HR: 94 (02-15-18 @ 14:30)  BP: 115/72 (02-15-18 @ 14:30)  RR: 18 (02-15-18 @ 14:30)  SpO2: 100% (02-15-18 @ 14:30)  Wt(kg): --    PHYSICAL EXAM:    Constitutional: NAD  Eyes: EOMI, sclera non-icteric  Neck: supple, no masses, no JVD  Respiratory: CTA b/l, good air entry b/l  Cardiovascular: RRR, no M/R/G  Gastrointestinal: soft, NTND, no masses palpable, + BS, no hepatosplenomegaly  Extremities: no c/c/e  Neurological: AAOx3      MEDICATIONS  (STANDING):  calcium carbonate 1250 mG + Vitamin D (OsCal 500 + D) 1 Tablet(s) Oral daily  docusate sodium 100 milliGRAM(s) Oral daily  enoxaparin Injectable 40 milliGRAM(s) SubCutaneous daily  HYDROmorphone  IVPB 8 milliGRAM(s) IV Intermittent <User Schedule>  melatonin 3 milliGRAM(s) Oral at bedtime  methadone    Tablet 5 milliGRAM(s) Oral <User Schedule>  methadone    Tablet 7.5 milliGRAM(s) Oral <User Schedule>  pregabalin 75 milliGRAM(s) Oral two times a day  propranolol 10 milliGRAM(s) Oral every 12 hours  senna 2 Tablet(s) Oral at bedtime    MEDICATIONS  (PRN):  HYDROmorphone  Injectable 4 milliGRAM(s) IV Push every 3 hours PRN Severe Pain (7 - 10)  HYDROmorphone  Injectable 3 milliGRAM(s) IV Push once PRN prior to MRI  naloxone Injectable 0.1 milliGRAM(s) IV Push every 3 minutes PRN Unarouseable or RR <11  polyethylene glycol 3350 17 Gram(s) Oral daily PRN Constipation      Allergies    No Known Allergies    Intolerances        LABS:                RADIOLOGY & ADDITIONAL TESTS:  Studies reviewed.

## 2018-02-15 NOTE — PROGRESS NOTE ADULT - ASSESSMENT
47 y.o M with hx of IVDU and HCV txf from OSH with diffuse mesfin lesions , biopsy of hip prelim showing adenocarcinoma of unknown origin    Adenocarcinoma- given very poor social situation and patient's current state, ie he is homeless, very STRONG LACK OF social support, it is not safe to give him any chemotherapy. this was discussed and explained to him and he understood

## 2018-02-15 NOTE — PROGRESS NOTE ADULT - ASSESSMENT
Impression:  1) Cirrhosis most likely chronic HCV. MELD-Na Score of 8. Reviewed imaging from [facility]  2) HCV - treatment naive  3) Liver lesion - indeterminate on MRI liver protocol in [facility]  4) Metastases - s/p pelvic mass bx favors poorly differentiated  adeno CA, site undetermined, immunostain pending  5) Pelvic mass with pathologic Fractures, Orthopedic and Rad Onc on board    Plan:  - F/u IR bx final results  - Monitor liver tests  - Limit hepatotoxic agents  - Would defer HCV treatment at this time given poorly differentiated Metastatic adenoCA  - Can consider doing EGD and colonoscopy to rule out occult GI malignancy if it changes management  - Follow up with Oncology.  - Recall GI/Hepatology as needed.

## 2018-02-15 NOTE — PROGRESS NOTE ADULT - SUBJECTIVE AND OBJECTIVE BOX
Pt seen earlier today  Improvement with new regimen but still suboptimal        T(C): 37.2 (02-15-18 @ 14:30), Max: 37.2 (02-15-18 @ 14:30)  HR: 90 (02-15-18 @ 22:06) (84 - 94)  BP: 116/68 (02-15-18 @ 22:06) (105/65 - 116/68)  RR: 18 (02-15-18 @ 22:06) (18 - 18)  SpO2: 100% (02-15-18 @ 22:06) (100% - 100%)  Wt(kg): --        CAPILLARY BLOOD GLUCOSE            calcium carbonate 1250 mG + Vitamin D (OsCal 500 + D) 1 Tablet(s) Oral daily  docusate sodium 100 milliGRAM(s) Oral daily  enoxaparin Injectable 40 milliGRAM(s) SubCutaneous daily  HYDROmorphone  Injectable 4 milliGRAM(s) IV Push every 3 hours PRN  HYDROmorphone  Injectable 3 milliGRAM(s) IV Push once PRN  HYDROmorphone  IVPB 8 milliGRAM(s) IV Intermittent <User Schedule>  melatonin 3 milliGRAM(s) Oral at bedtime  methadone    Tablet 5 milliGRAM(s) Oral <User Schedule>  methadone    Tablet 7.5 milliGRAM(s) Oral <User Schedule>  naloxone Injectable 0.1 milliGRAM(s) IV Push every 3 minutes PRN  polyethylene glycol 3350 17 Gram(s) Oral daily PRN  pregabalin 75 milliGRAM(s) Oral two times a day  propranolol 10 milliGRAM(s) Oral every 12 hours  senna 2 Tablet(s) Oral at bedtime                  Procalc  BNP  ABG              blood and urine cultures                PERTINENT PMH REVIEWED:  [x ] YES [ ] NO           SOCIAL HISTORY:  Significant other/partner:  [x ] YES  [ ] NO            Children:  [x ] YES  [ ] NO                   Confucianism/Spirituality: TBD  Substance hx:  [x ] YES   [ ] NO           Tobacco hx:  [ x] YES  [ ] NO             Alcohol hx: [x ] YES  [ ] NO        Home Opioid hx:  [x ] YES  [ ] NO   Living Situation: [ ] Home  [ ] Long term care  [ ] Rehab Shelter    REFERRALS:   [ ] Chaplaincy  [ ] Hospice  [ ] Child Life  [ ] Social Work  [ ] Case management [ ] Holistic Therapy     FAMILY HISTORY:  No pertinent family history in first degree relatives    [ x] Family history non contributory     BASELINE ADLs (prior to admission):  Independent [x ] moderately [ ] fully   Dependent   [ ] moderately [ ] fully    ADVANCE DIRECTIVES:  [ ] YES [x ] NO   DNR [ ] YES [x ] NO                      MOLST  [ ] YES [ x] NO    Living Will  [ ] YES [x ] NO    Health Care Proxy [ ] YES  [x ] NO      [x ] Surrogate  [ ] HCP  [ ] Guardian:     Unbefriended, has adult children but no   idea about their whereabouts                                                        Phone#:    Allergies    No Known Allergies    Intolerances               PRESENT SYMPTOMS:  Source: [x ] Patient   [ ] Family   [ ] Team     Pain: [x ] YES [ ] NO  OLDCARTS:  See above    Dyspnea: [x ] YES [ ] NO On exertion  Anxiety: [x ] YES [ ] NO Minimal  Fatigue: [x ] YES [ ] NO   Nausea: [ ] YES [ x] NO  Loss of appetite: [ ] YES [x ] NO   Constipation: [ ] YES [x ] NO     Other Symptoms:  [ x] All other review of systems negative   [ ] Unable to obtain due to poor mentation     Karnofsky Performance Score/Palliative Performance Status Version 2:     30     % due to Fx  Protein Calorie Malutrition:  [ ] Mild   [ ] Moderate   [ ] Severe       Physical Exam:    General: [ x] Alert,  A&O x     [ ] lethargic   [ ] Agitated   [ ] Cachexia   HEENT: [ ] Normal   [ ] Dry mouth   [ ] ET Tube    [ ] Trach   Lungs: [ x] Clear [ ] Rhonchi  [ ] Crackles [ ] Wheezing [ ] Tachypnea  [ ] Audible excessive secretions   Cardiovascular:  [ ] Regular rate and rhythm  [ ] Irregular [ x] Tachycardia   [ ] Bradycardia   Abdomen: [ x] Soft  [ ] Distended  [ ]  [ ] +BS  [ ] Non tender [ x] Tender  [ ]PEG   [ ] NGT   Last BM:     Genitourinary: [x ] Normal [ ] Incontinent   [ ] Oliguria/Anuria   [ ] [place]  Musculoskeletal:  [ ] Normal   [ ] Generalized weakness  [ x] Bedbound   Neurological: [x ] No focal deficits  [ ] Cognitive impairment     Skin: [x ] Normal   [ ] Pressure ulcers     LABS:                           I&O's Summary      RADIOLOGY & ADDITIONAL STUDIES:

## 2018-02-15 NOTE — CHART NOTE - NSCHARTNOTEFT_GEN_A_CORE
Called by RN following patient reporting suicidal ideation to [facility]. Patient is a 47M with metastatic adenocarcinoma of uncertain primary with pathologic hip fracture. Patient today told that no curative treatment is available for his disease and that all further medical management to be offered will be supportive, symptomatic. Following this, patient was seen by SW, beginning to discuss disposition and plans going forward. During this encounter, patient reporting thoughts of killing himself. Patient seen and examined. Patient states that he was 'floored' by this information today and had not had any idea that this disease would be incurable. States that he is still processing. States that he had thought of 'ending it' however states that he has no plan to do so. States that this would not be how he would want his 'legacy to be remembered'. Patient denies thoughts of hurting others. States that he wants to continue discussing the 'next step' with social work. Patient reports that he wants to maximize the time he has remaining with his four children.    #passive SI  - Patient with thoughts of suicide following receiving news of a grave prognosis given incurable nature of disease  - Patient without anxiety, plan, or HI  - Patient has no h/o harming self or others  - Patient has protective elements of 4 children and is forward thinking, wanting to spend more time with them  - Patient is desiring further discussion with providers  - Provided emotional support, offered holistic care which patient accepted  - No further psychiatric intervention at this time, will continue to monitor

## 2018-02-15 NOTE — PROGRESS NOTE ADULT - PROBLEM SELECTOR PLAN 1
left ilium, right acetabulum, and left sacrum in setting of newly diagnosed malignancy. CT findings c/w metastatic dz - primary to be determined  - outside path suggestive of pancreaticobiliary or lung primary (though necrotic tissue with  - c/w hydromorphone 8mg qd and will continue methadone 7.5/5mg  w/ hydromorphone 4mg q3h PRN for severe pain; pregabalin also added  -f/u palliative for pain management  - c/w bowel regimen due to opioid induced constipation  - f/u MR femur and MRI pelvis as per ortho recs, however patient has been declining past several attempts, has extra pain medication to take w/  - appreciate ortho/ rad/onc input - potential candidate for radiation therapy, to be coordinated depending on biopsy result.  -Per Rad Onc patient does not currently want radiation, awaiting biopsy results. left ilium, right acetabulum, and left sacrum in setting of newly diagnosed malignancy. CT findings c/w metastatic dz - primary to be determined  - c/w hydromorphone 8mg qd and will continue methadone 7.5/5mg  w/ hydromorphone 4mg q3h PRN for severe pain; pregabalin also added  -f/u palliative for pain management  - c/w bowel regimen due to opioid induced constipation, last BM 3 days ago.  - f/u MR femur and MRI pelvis as per ortho recs, however patient has been declining past several attempts, has extra pain medication to take w/ states he is ready to go today.  -? about ortho intervention?  - appreciate ortho/ rad/onc input - potential candidate for radiation therapy, to be coordinated depending on biopsy result.  -Per Rad Onc patient does not currently want radiation left ilium, right acetabulum, and left sacrum in setting of newly diagnosed malignancy. CT findings c/w metastatic dz - primary to be determined  - c/w hydromorphone 8mg qd and will continue methadone 7.5/5mg  w/ hydromorphone 4mg q3h PRN for severe pain; pregabalin also added  -f/u palliative for pain management  - c/w bowel regimen due to opioid induced constipation, last BM 3 days ago.  - f/u MR femur and MRI pelvis as per ortho recs, however patient has been declining past several attempts, has extra pain medication to take w/ states he is ready to go today.  -? about ortho intervention? - likely no surgical intervention  - appreciate ortho/ rad/onc input - potential candidate for radiation therapy however Per Rad Onc patient does not currently want radiation

## 2018-02-15 NOTE — PROGRESS NOTE ADULT - PROBLEM SELECTOR PLAN 1
ATC methadone 7.5/5   On IVPB Dilaudid 8mg q6H  Maintain IV dilaudid 4mg q3H with stipulations relative to the timing of ATC medications  If pain Rx is needed early consider contacting palliative care to determine if doses could be given early    Any dose beyond that must be IVPB/in NS    Avoid benzodiazepines given current methadone use

## 2018-02-15 NOTE — PROGRESS NOTE ADULT - ASSESSMENT
A/P: 47 year old male with L sacrum/ilium lesion, R acetabular lesion. Pain well controlled doing well.   - Pain Control  - Biopsy result with poorly differentiated adenocarcinoma  -OR planning

## 2018-02-15 NOTE — PROGRESS NOTE ADULT - PROBLEM SELECTOR PLAN 2
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  -Await stains for further classification.  -Patient likely poor chemotherapy candidate due to spread of disease, will discuss with onc about next step. 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  -Await stains for further classification.  -Patient poor chemotherapy candidate due to spread of disease, poor social support, appreciate onc input

## 2018-02-15 NOTE — PROGRESS NOTE ADULT - SUBJECTIVE AND OBJECTIVE BOX
Tacos Garcia  PGY 1  Team 1  Pager [phone]    Patient is a 47y old  Male who presents with a chief complaint of hip pain (03 Feb 2018 12:32)      SUBJECTIVE / OVERNIGHT EVENTS:    MEDICATIONS  (STANDING):  calcium carbonate 1250 mG + Vitamin D (OsCal 500 + D) 1 Tablet(s) Oral daily  docusate sodium 100 milliGRAM(s) Oral daily  enoxaparin Injectable 40 milliGRAM(s) SubCutaneous daily  HYDROmorphone  IVPB 8 milliGRAM(s) IV Intermittent <User Schedule>  melatonin 3 milliGRAM(s) Oral at bedtime  methadone    Tablet 5 milliGRAM(s) Oral <User Schedule>  methadone    Tablet 7.5 milliGRAM(s) Oral <User Schedule>  pregabalin 75 milliGRAM(s) Oral two times a day  propranolol 10 milliGRAM(s) Oral every 12 hours  senna 2 Tablet(s) Oral at bedtime    MEDICATIONS  (PRN):  HYDROmorphone  Injectable 4 milliGRAM(s) IV Push every 3 hours PRN Severe Pain (7 - 10)  HYDROmorphone  Injectable 3 milliGRAM(s) IV Push once PRN prior to MRI  naloxone Injectable 0.1 milliGRAM(s) IV Push every 3 minutes PRN Unarouseable or RR <11  polyethylene glycol 3350 17 Gram(s) Oral daily PRN Constipation      Vital Signs Last 24 Hrs  T(C): 37.1 (15 Feb 2018 05:47), Max: 37.3 (14 Feb 2018 21:40)  T(F): 98.7 (15 Feb 2018 05:47), Max: 99.2 (14 Feb 2018 21:40)  HR: 84 (15 Feb 2018 05:47) (82 - 84)  BP: 105/65 (15 Feb 2018 05:47) (105/65 - 108/63)  BP(mean): --  RR: 18 (15 Feb 2018 05:47) (18 - 18)  SpO2: 100% (15 Feb 2018 05:47) (98% - 100%)  CAPILLARY BLOOD GLUCOSE        I&O's Summary      PHYSICAL EXAM:  GENERAL: NAD, well-developed  HEAD:  Atraumatic, Normocephalic  EYES: EOMI, PERRLA, conjunctiva and sclera clear  NECK: Supple, No JVD  CHEST/LUNG: Clear to auscultation bilaterally; No wheeze  HEART: Regular rate and rhythm; No murmurs, rubs, or gallops  ABDOMEN: Soft, Nontender, Nondistended; Bowel sounds present  EXTREMITIES:  2+ Peripheral Pulses, No clubbing, cyanosis, or edema  PSYCH: AAOx3  NEUROLOGY: non-focal  SKIN: No rashes or lesions    LABS:                    RADIOLOGY & ADDITIONAL TESTS:    Imaging Personally Reviewed:    Consultant(s) Notes Reviewed:      Care Discussed with Consultants/Other Providers: Tacos Garcia  PGY 1  Team 1  Pager [phone]    Patient is a 47y old  Male who presents with a chief complaint of hip pain (03 Feb 2018 12:32)      SUBJECTIVE / OVERNIGHT EVENTS:  Patient states that he is willing to have MRI today w/ extra pain medications.  The hip and left leg pain has been moderately controlled but that has been breakthrough pain.  He otherwise has no complaints denying fever, chills, chest pain, abdominal pain, N/V, diarrhea, dysuria.    MEDICATIONS  (STANDING):  calcium carbonate 1250 mG + Vitamin D (OsCal 500 + D) 1 Tablet(s) Oral daily  docusate sodium 100 milliGRAM(s) Oral daily  enoxaparin Injectable 40 milliGRAM(s) SubCutaneous daily  HYDROmorphone  IVPB 8 milliGRAM(s) IV Intermittent <User Schedule>  melatonin 3 milliGRAM(s) Oral at bedtime  methadone    Tablet 5 milliGRAM(s) Oral <User Schedule>  methadone    Tablet 7.5 milliGRAM(s) Oral <User Schedule>  pregabalin 75 milliGRAM(s) Oral two times a day  propranolol 10 milliGRAM(s) Oral every 12 hours  senna 2 Tablet(s) Oral at bedtime    MEDICATIONS  (PRN):  HYDROmorphone  Injectable 4 milliGRAM(s) IV Push every 3 hours PRN Severe Pain (7 - 10)  HYDROmorphone  Injectable 3 milliGRAM(s) IV Push once PRN prior to MRI  naloxone Injectable 0.1 milliGRAM(s) IV Push every 3 minutes PRN Unarouseable or RR <11  polyethylene glycol 3350 17 Gram(s) Oral daily PRN Constipation      Vital Signs Last 24 Hrs  T(C): 37.1 (15 Feb 2018 05:47), Max: 37.3 (14 Feb 2018 21:40)  T(F): 98.7 (15 Feb 2018 05:47), Max: 99.2 (14 Feb 2018 21:40)  HR: 84 (15 Feb 2018 05:47) (82 - 84)  BP: 105/65 (15 Feb 2018 05:47) (105/65 - 108/63)  BP(mean): --  RR: 18 (15 Feb 2018 05:47) (18 - 18)  SpO2: 100% (15 Feb 2018 05:47) (98% - 100%)  CAPILLARY BLOOD GLUCOSE        I&O's Summary      PHYSICAL EXAM:  Gen: NAD this AM, resting comfortably in bed, eating in bed  HEENT: EOMI, mucous membranes moist, no oropharyngeal exudates  Neck:  no cervical lymphadenopathy  Pulmonary: CTAB, no rhonchi, wheezes or rales  Cardiac: RRR, normal S1S2, no r/m/g  GI:  soft, nontender, nondistended  Extremities: cachetic legs, warm, well perfused, no peripheral edema  MSK: LLE ROM limited due to pain decreased more on R than L  Skin: skin intact, no skin tears or rashes or other lesions    LABS:                    RADIOLOGY & ADDITIONAL TESTS:    Imaging Personally Reviewed:    Consultant(s) Notes Reviewed:  Ortho, palliative, hepatology    Care Discussed with Consultants/Other Providers: Tacos Garcia  PGY 1  Team 1  Pager [phone]    Patient is a 47y old  Male who presents with a chief complaint of hip pain (03 Feb 2018 12:32)      SUBJECTIVE / OVERNIGHT EVENTS:  Patient states that he is willing to have MRI today w/ extra pain medications.  The hip and left leg pain has been moderately controlled but that has been breakthrough pain.  Last BM 3 days ago, wants to wait for miralaax until after MRI.  He otherwise has no complaints denying fever, chills, chest pain, abdominal pain, N/V, diarrhea, dysuria.    MEDICATIONS  (STANDING):  calcium carbonate 1250 mG + Vitamin D (OsCal 500 + D) 1 Tablet(s) Oral daily  docusate sodium 100 milliGRAM(s) Oral daily  enoxaparin Injectable 40 milliGRAM(s) SubCutaneous daily  HYDROmorphone  IVPB 8 milliGRAM(s) IV Intermittent <User Schedule>  melatonin 3 milliGRAM(s) Oral at bedtime  methadone    Tablet 5 milliGRAM(s) Oral <User Schedule>  methadone    Tablet 7.5 milliGRAM(s) Oral <User Schedule>  pregabalin 75 milliGRAM(s) Oral two times a day  propranolol 10 milliGRAM(s) Oral every 12 hours  senna 2 Tablet(s) Oral at bedtime    MEDICATIONS  (PRN):  HYDROmorphone  Injectable 4 milliGRAM(s) IV Push every 3 hours PRN Severe Pain (7 - 10)  HYDROmorphone  Injectable 3 milliGRAM(s) IV Push once PRN prior to MRI  naloxone Injectable 0.1 milliGRAM(s) IV Push every 3 minutes PRN Unarouseable or RR <11  polyethylene glycol 3350 17 Gram(s) Oral daily PRN Constipation      Vital Signs Last 24 Hrs  T(C): 37.1 (15 Feb 2018 05:47), Max: 37.3 (14 Feb 2018 21:40)  T(F): 98.7 (15 Feb 2018 05:47), Max: 99.2 (14 Feb 2018 21:40)  HR: 84 (15 Feb 2018 05:47) (82 - 84)  BP: 105/65 (15 Feb 2018 05:47) (105/65 - 108/63)  BP(mean): --  RR: 18 (15 Feb 2018 05:47) (18 - 18)  SpO2: 100% (15 Feb 2018 05:47) (98% - 100%)  CAPILLARY BLOOD GLUCOSE        I&O's Summary      PHYSICAL EXAM:  Gen: NAD this AM, resting comfortably in bed, eating in bed  HEENT: EOMI, mucous membranes moist, no oropharyngeal exudates  Neck:  no cervical lymphadenopathy  Pulmonary: CTAB, no rhonchi, wheezes or rales  Cardiac: RRR, normal S1S2, no r/m/g  GI:  soft, nontender, nondistended  Extremities: cachetic legs, warm, well perfused, no peripheral edema  MSK: LLE ROM limited due to pain decreased more on R than L  Skin: skin intact, no skin tears or rashes or other lesions    LABS:                    RADIOLOGY & ADDITIONAL TESTS:    Imaging Personally Reviewed:    Consultant(s) Notes Reviewed:  Ortho, palliative, hepatology    Care Discussed with Consultants/Other Providers: Tacos Garcia  PGY 1  Team 1  Pager [phone]    Patient is a 47y old  Male who presents with a chief complaint of hip pain (03 Feb 2018 12:32)    SUBJECTIVE / OVERNIGHT EVENTS:  Patient states that he is willing to have MRI today w/ extra pain medications.  The hip and left leg pain has been moderately controlled but that has been breakthrough pain.  Last BM 3 days ago, wants to wait for miralaax until after MRI.  He otherwise has no complaints denying fever, chills, chest pain, abdominal pain, N/V, diarrhea, dysuria.    MEDICATIONS  (STANDING):  calcium carbonate 1250 mG + Vitamin D (OsCal 500 + D) 1 Tablet(s) Oral daily  docusate sodium 100 milliGRAM(s) Oral daily  enoxaparin Injectable 40 milliGRAM(s) SubCutaneous daily  HYDROmorphone  IVPB 8 milliGRAM(s) IV Intermittent <User Schedule>  melatonin 3 milliGRAM(s) Oral at bedtime  methadone    Tablet 5 milliGRAM(s) Oral <User Schedule>  methadone    Tablet 7.5 milliGRAM(s) Oral <User Schedule>  pregabalin 75 milliGRAM(s) Oral two times a day  propranolol 10 milliGRAM(s) Oral every 12 hours  senna 2 Tablet(s) Oral at bedtime    MEDICATIONS  (PRN):  HYDROmorphone  Injectable 4 milliGRAM(s) IV Push every 3 hours PRN Severe Pain (7 - 10)  HYDROmorphone  Injectable 3 milliGRAM(s) IV Push once PRN prior to MRI  naloxone Injectable 0.1 milliGRAM(s) IV Push every 3 minutes PRN Unarouseable or RR <11  polyethylene glycol 3350 17 Gram(s) Oral daily PRN Constipation    Vital Signs Last 24 Hrs  T(C): 37.1 (15 Feb 2018 05:47), Max: 37.3 (14 Feb 2018 21:40)  T(F): 98.7 (15 Feb 2018 05:47), Max: 99.2 (14 Feb 2018 21:40)  HR: 84 (15 Feb 2018 05:47) (82 - 84)  BP: 105/65 (15 Feb 2018 05:47) (105/65 - 108/63)  BP(mean): --  RR: 18 (15 Feb 2018 05:47) (18 - 18)  SpO2: 100% (15 Feb 2018 05:47) (98% - 100%)  CAPILLARY BLOOD GLUCOSE    I&O's Summary    PHYSICAL EXAM:  Gen: NAD this AM, resting comfortably in bed, eating in bed  HEENT: EOMI, mucous membranes moist, no oropharyngeal exudates  Neck:  no cervical lymphadenopathy  Pulmonary: CTAB, no rhonchi, wheezes or rales  Cardiac: RRR, normal S1S2, no r/m/g  GI:  soft, nontender, nondistended  Extremities: cachetic legs, warm, well perfused, no peripheral edema  MSK: LLE ROM limited due to pain decreased more on R than L  Skin: skin intact, no skin tears or rashes or other lesions    LABS:    RADIOLOGY & ADDITIONAL TESTS:    Imaging Personally Reviewed:    Consultant(s) Notes Reviewed:  Ortho, palliative, hepatology    Care Discussed with Consultants/Other Providers: Ortho, Onc Dr. Singh

## 2018-02-15 NOTE — PROGRESS NOTE ADULT - ASSESSMENT
HPI:  48 yo Male with hx of drug abuse, hepatitis C, chronic back pain, possible new diagnosis of hepatocellular carcinoma transferred from outside hospital for pathologic fracture consulted for pain

## 2018-02-15 NOTE — CHART NOTE - NSCHARTNOTEFT_GEN_A_CORE
Source: Patient [x]    Family [ ]     other [ ] Medical record reviewed. Patient seen for length of stay nutrition follow-up. Patient reports excellent appetite and PO intake. No nausea/vomiting or abdominal pain noted. Patient on bowel regimen to alleviate constipation as patient on pain medication. Receiving Ensure Enlive supplements to optimize PO intake. Patient being followed by Palliative.    Diet : Regular, Ensure Enlive 240mls 2x daily (700kcal, 40g protein).   PO intake:  < 50% [ ] 50-75% [ ]   % [x ]  other :    Current Weight: no recent weight recorded, 139.9# (63.5kg) (2/02)   % Weight Change: N/A    Pertinent Medications: MEDICATIONS  (STANDING):  calcium carbonate 1250 mG + Vitamin D (OsCal 500 + D) 1 Tablet(s) Oral daily  docusate sodium 100 milliGRAM(s) Oral daily  enoxaparin Injectable 40 milliGRAM(s) SubCutaneous daily  HYDROmorphone  IVPB 8 milliGRAM(s) IV Intermittent <User Schedule>  melatonin 3 milliGRAM(s) Oral at bedtime  methadone    Tablet 5 milliGRAM(s) Oral <User Schedule>  methadone    Tablet 7.5 milliGRAM(s) Oral <User Schedule>  pregabalin 75 milliGRAM(s) Oral two times a day  propranolol 10 milliGRAM(s) Oral every 12 hours  senna 2 Tablet(s) Oral at bedtime    MEDICATIONS  (PRN):  HYDROmorphone  Injectable 4 milliGRAM(s) IV Push every 3 hours PRN Severe Pain (7 - 10)  HYDROmorphone  Injectable 3 milliGRAM(s) IV Push once PRN prior to MRI  naloxone Injectable 0.1 milliGRAM(s) IV Push every 3 minutes PRN Unarouseable or RR <11  polyethylene glycol 3350 17 Gram(s) Oral daily PRN Constipation    Pertinent Labs:  no labs for 2/15  Skin: No pressure ulcers/DTI noted per flowsheets.   Edema: right foot, left foot trace edema     Estimated Needs:   [x] no change since previous assessment  [ ] recalculated:     Previous Nutrition Diagnosis:   [x] Malnutrition - severe  Nutrition Diagnosis is [x] ongoing  [ ] resolved [ ] not applicable     Interventions:   1. Continue Regular diet with Ensure Enlive 240mls 2x daily (700kcal, 40g protein)..    Monitoring and Evaluation:   1. Monitor weights, labs, BM's, skin integrity, p.o. intake.   2. Patient to meet > 75% estimated pro/kcal needs.   3. Tolerance to diet.   RD to remain available, Komal Caraballo RD, CDN.

## 2018-02-15 NOTE — PROGRESS NOTE ADULT - ASSESSMENT
48 yo Male with hx of drug abuse, hepatitis C, chronic back pain, transferred from outside hospital for pathologic fractures of left ilium, right acetabulum, and left sacrum, undergoing further malignancy workup s/p IR guided biopsy and pending MRI imaging of LLE in anticipation of potential RT or orthopedic intervention. 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 Not candidate for chemotherapy...

## 2018-02-15 NOTE — PROGRESS NOTE ADULT - SUBJECTIVE AND OBJECTIVE BOX
No acute events overnight. Pain controlled.     PE:  Vital Signs Last 24 Hrs  T(C): 37.1 (15 Feb 2018 05:47), Max: 37.3 (14 Feb 2018 21:40)  T(F): 98.7 (15 Feb 2018 05:47), Max: 99.2 (14 Feb 2018 21:40)  HR: 84 (15 Feb 2018 05:47) (82 - 84)  BP: 105/65 (15 Feb 2018 05:47) (105/65 - 108/63)  BP(mean): --  RR: 18 (15 Feb 2018 05:47) (18 - 18)  SpO2: 100% (15 Feb 2018 05:47) (98% - 100%)    Exam:  Gen: NAD  RLE:  Motor: 5/5 EHL/FHL/TA/Gastrocnemius  Sensory: SILT DP/SP/S/S/T nerve distributions  Vascular: 2+ Dorsalis Pedis pulse    LLE:  Motor: 2/5 EHL, 5/5 FHL/TA/Gastrocnemius  Sensory: Numbness of SP distrubution, SILT DP/S/S/T nerve distributions  Vascular: 2+ Dorsalis Pedis pulse      IR biopsy diagnosis:  Final Diagnosis  SOFT TISSUE, PELVIC AREA, LEFT, CORE BIOPSY  POSITIVE FOR MALIGNANT CELLS.  Poorly differentiated non-small cellcarcinoma, favor  adenocarcinoma

## 2018-02-15 NOTE — PROGRESS NOTE ADULT - SUBJECTIVE AND OBJECTIVE BOX
Pt beig followed by Hepatology for Cirrhosis likely due to Hep C and Pelvis mass likely poorly differentiated adenoCA, unknown primary, pending Immunostaining.      Interval Events: No events overnight, still in pain, palliative care on board.    Allergies:  No Known Allergies      Hospital Medications:  calcium carbonate 1250 mG + Vitamin D (OsCal 500 + D) 1 Tablet(s) Oral daily  docusate sodium 100 milliGRAM(s) Oral daily  enoxaparin Injectable 40 milliGRAM(s) SubCutaneous daily  HYDROmorphone  Injectable 4 milliGRAM(s) IV Push every 3 hours PRN  HYDROmorphone  Injectable 3 milliGRAM(s) IV Push once PRN  HYDROmorphone  IVPB 8 milliGRAM(s) IV Intermittent <User Schedule>  melatonin 3 milliGRAM(s) Oral at bedtime  methadone    Tablet 5 milliGRAM(s) Oral <User Schedule>  methadone    Tablet 7.5 milliGRAM(s) Oral <User Schedule>  naloxone Injectable 0.1 milliGRAM(s) IV Push every 3 minutes PRN  polyethylene glycol 3350 17 Gram(s) Oral daily PRN  pregabalin 75 milliGRAM(s) Oral two times a day  propranolol 10 milliGRAM(s) Oral every 12 hours  senna 2 Tablet(s) Oral at bedtime      PMHX/PSHX:  IV drug abuse  Hepatitis C  No significant past surgical history      Family history:  No pertinent family history in first degree relatives      ROS:     General:  No  fevers, chills, night sweats, fatigue,   Eyes:  Good vision, no reported pain  ENT:  No sore throat, pain, runny nose, dysphagia  CV:  No pain, palpitations, hypo/hypertension  Resp:  No dyspnea, cough, tachypnea, wheezing  GI:  See HPI  :  No pain, bleeding, incontinence, nocturia  Muscle:  C/O pain all over the body.  Neuro:  No weakness, tingling, memory problems  Skin:  No rash, edema      PHYSICAL EXAM:     GENERAL:  No distress  CHEST:  Full & symmetric excursion, no increased effort, breath sounds clear  HEART:  Regular rhythm, S1, S2, no added sounds  ABDOMEN:  Soft, non-tender, non-distended, normoactive bowel sounds.  NEURO:  Alert, oriented, non focal    Vital Signs:  Vital Signs Last 24 Hrs  T(C): 37.1 (15 Feb 2018 05:47), Max: 37.3 (14 Feb 2018 21:40)  T(F): 98.7 (15 Feb 2018 05:47), Max: 99.2 (14 Feb 2018 21:40)  HR: 84 (15 Feb 2018 05:47) (82 - 84)  BP: 105/65 (15 Feb 2018 05:47) (105/65 - 108/63)  BP(mean): --  RR: 18 (15 Feb 2018 05:47) (18 - 18)  SpO2: 100% (15 Feb 2018 05:47) (98% - 100%)  Daily     Daily

## 2018-02-15 NOTE — PROVIDER CONTACT NOTE (OTHER) - ACTION/TREATMENT ORDERED:
Continuous observation initiated by Margret Smith RN.
Apply warm compresses.
Dilaudid 4mg IVP given early as ordered.
OK to give next dilaudid dose early.

## 2018-02-15 NOTE — PROGRESS NOTE ADULT - ATTENDING COMMENTS
Patient seen and examined, d/w HS, agree with above with following additions.     Per d/w Ortho, no plans for surgical intervention at this time. D/w onc Dr. Singh, patient is not a candidate for chemotherapy. I spoke with the patient regarding his metastatic cancer and no chemotherapy/surgical treatments to be offered. He was quite upset, and is experiencing existential crisis with significant emotional and spiritual distress. Talked about his goals and how we could try to help him achieve it. Reported that the  did visit him earlier and prayed with him. States he has a lot to process. Continue with emotional support, offer holistic services, c/w chaplaincy. Appreciate palliative assistance with pain management and support. Patient seen and examined, d/w HS, agree with above with following additions.     Per d/w Ortho, no plans for surgical intervention at this time. D/w onc Dr. Singh, patient is not a candidate for chemotherapy. I spoke with the patient regarding his metastatic cancer and no chemotherapy/surgical treatments to be offered. He was quite upset, and is experiencing existential crisis with significant emotional and spiritual distress. Talked about his goals and how we could try to help him achieve it. Reported that the  did visit him earlier and prayed with him. States he has a lot to process. Continue with emotional support, offer holistic services, c/w chaplaincy. Appreciate palliative assistance with pain management and support. ?Hospice.

## 2018-02-15 NOTE — PROVIDER CONTACT NOTE (OTHER) - BACKGROUND
New Primary malignancy , likely liver, presenting with pathology fracture of sacrum, right acetabulum and L ilium

## 2018-02-16 VITALS
SYSTOLIC BLOOD PRESSURE: 102 MMHG | HEART RATE: 84 BPM | DIASTOLIC BLOOD PRESSURE: 68 MMHG | OXYGEN SATURATION: 100 % | RESPIRATION RATE: 18 BRPM | TEMPERATURE: 99 F

## 2018-02-16 DIAGNOSIS — Z71.89 OTHER SPECIFIED COUNSELING: ICD-10-CM

## 2018-02-16 PROCEDURE — 99239 HOSP IP/OBS DSCHRG MGMT >30: CPT

## 2018-02-16 RX ORDER — HYDROMORPHONE HYDROCHLORIDE 2 MG/ML
4 INJECTION INTRAMUSCULAR; INTRAVENOUS; SUBCUTANEOUS
Qty: 0 | Refills: 0 | COMMUNITY

## 2018-02-16 RX ORDER — METHADONE HYDROCHLORIDE 40 MG/1
7.5 TABLET ORAL
Qty: 0 | Refills: 0 | COMMUNITY
Start: 2018-02-16

## 2018-02-16 RX ORDER — DOCUSATE SODIUM 100 MG
1 CAPSULE ORAL
Qty: 0 | Refills: 0 | COMMUNITY
Start: 2018-02-16

## 2018-02-16 RX ORDER — POLYETHYLENE GLYCOL 3350 17 G/17G
17 POWDER, FOR SOLUTION ORAL
Qty: 0 | Refills: 0 | COMMUNITY
Start: 2018-02-16

## 2018-02-16 RX ORDER — ENOXAPARIN SODIUM 100 MG/ML
40 INJECTION SUBCUTANEOUS
Qty: 0 | Refills: 0 | COMMUNITY
Start: 2018-02-16

## 2018-02-16 RX ORDER — LANOLIN ALCOHOL/MO/W.PET/CERES
1 CREAM (GRAM) TOPICAL
Qty: 0 | Refills: 0 | COMMUNITY
Start: 2018-02-16

## 2018-02-16 RX ORDER — PROPRANOLOL HCL 160 MG
1 CAPSULE, EXTENDED RELEASE 24HR ORAL
Qty: 0 | Refills: 0 | COMMUNITY
Start: 2018-02-16

## 2018-02-16 RX ORDER — HYDROMORPHONE HYDROCHLORIDE 2 MG/ML
4 INJECTION INTRAMUSCULAR; INTRAVENOUS; SUBCUTANEOUS
Qty: 0 | Refills: 0 | Status: DISCONTINUED | OUTPATIENT
Start: 2018-02-16 | End: 2018-02-16

## 2018-02-16 RX ORDER — HYDROMORPHONE HYDROCHLORIDE 2 MG/ML
8 INJECTION INTRAMUSCULAR; INTRAVENOUS; SUBCUTANEOUS
Qty: 0 | Refills: 0 | COMMUNITY

## 2018-02-16 RX ORDER — NALOXONE HYDROCHLORIDE 4 MG/.1ML
0.4 SPRAY NASAL
Qty: 0 | Refills: 0 | COMMUNITY
Start: 2018-02-16

## 2018-02-16 RX ORDER — SENNA PLUS 8.6 MG/1
2 TABLET ORAL
Qty: 0 | Refills: 0 | COMMUNITY
Start: 2018-02-16

## 2018-02-16 RX ORDER — HYDROMORPHONE HYDROCHLORIDE 2 MG/ML
2 INJECTION INTRAMUSCULAR; INTRAVENOUS; SUBCUTANEOUS
Qty: 0 | Refills: 0 | COMMUNITY
Start: 2018-02-16

## 2018-02-16 RX ORDER — HYDROMORPHONE HYDROCHLORIDE 2 MG/ML
4 INJECTION INTRAMUSCULAR; INTRAVENOUS; SUBCUTANEOUS
Qty: 0 | Refills: 0 | COMMUNITY
Start: 2018-02-16

## 2018-02-16 RX ORDER — METHADONE HYDROCHLORIDE 40 MG/1
5 TABLET ORAL
Qty: 0 | Refills: 0 | COMMUNITY

## 2018-02-16 RX ADMIN — METHADONE HYDROCHLORIDE 5 MILLIGRAM(S): 40 TABLET ORAL at 06:05

## 2018-02-16 RX ADMIN — HYDROMORPHONE HYDROCHLORIDE 203.2 MILLIGRAM(S): 2 INJECTION INTRAMUSCULAR; INTRAVENOUS; SUBCUTANEOUS at 04:14

## 2018-02-16 RX ADMIN — HYDROMORPHONE HYDROCHLORIDE 4 MILLIGRAM(S): 2 INJECTION INTRAMUSCULAR; INTRAVENOUS; SUBCUTANEOUS at 17:04

## 2018-02-16 RX ADMIN — HYDROMORPHONE HYDROCHLORIDE 4 MILLIGRAM(S): 2 INJECTION INTRAMUSCULAR; INTRAVENOUS; SUBCUTANEOUS at 11:19

## 2018-02-16 RX ADMIN — Medication 1 TABLET(S): at 11:04

## 2018-02-16 RX ADMIN — HYDROMORPHONE HYDROCHLORIDE 4 MILLIGRAM(S): 2 INJECTION INTRAMUSCULAR; INTRAVENOUS; SUBCUTANEOUS at 17:19

## 2018-02-16 RX ADMIN — Medication 75 MILLIGRAM(S): at 06:05

## 2018-02-16 RX ADMIN — HYDROMORPHONE HYDROCHLORIDE 4 MILLIGRAM(S): 2 INJECTION INTRAMUSCULAR; INTRAVENOUS; SUBCUTANEOUS at 11:04

## 2018-02-16 RX ADMIN — HYDROMORPHONE HYDROCHLORIDE 8 MILLIGRAM(S): 2 INJECTION INTRAMUSCULAR; INTRAVENOUS; SUBCUTANEOUS at 15:33

## 2018-02-16 RX ADMIN — HYDROMORPHONE HYDROCHLORIDE 4 MILLIGRAM(S): 2 INJECTION INTRAMUSCULAR; INTRAVENOUS; SUBCUTANEOUS at 14:04

## 2018-02-16 RX ADMIN — HYDROMORPHONE HYDROCHLORIDE 4 MILLIGRAM(S): 2 INJECTION INTRAMUSCULAR; INTRAVENOUS; SUBCUTANEOUS at 07:22

## 2018-02-16 RX ADMIN — HYDROMORPHONE HYDROCHLORIDE 203.2 MILLIGRAM(S): 2 INJECTION INTRAMUSCULAR; INTRAVENOUS; SUBCUTANEOUS at 15:18

## 2018-02-16 RX ADMIN — HYDROMORPHONE HYDROCHLORIDE 4 MILLIGRAM(S): 2 INJECTION INTRAMUSCULAR; INTRAVENOUS; SUBCUTANEOUS at 07:07

## 2018-02-16 RX ADMIN — HYDROMORPHONE HYDROCHLORIDE 8 MILLIGRAM(S): 2 INJECTION INTRAMUSCULAR; INTRAVENOUS; SUBCUTANEOUS at 10:03

## 2018-02-16 RX ADMIN — HYDROMORPHONE HYDROCHLORIDE 4 MILLIGRAM(S): 2 INJECTION INTRAMUSCULAR; INTRAVENOUS; SUBCUTANEOUS at 01:30

## 2018-02-16 RX ADMIN — ENOXAPARIN SODIUM 40 MILLIGRAM(S): 100 INJECTION SUBCUTANEOUS at 11:04

## 2018-02-16 RX ADMIN — HYDROMORPHONE HYDROCHLORIDE 4 MILLIGRAM(S): 2 INJECTION INTRAMUSCULAR; INTRAVENOUS; SUBCUTANEOUS at 14:19

## 2018-02-16 RX ADMIN — HYDROMORPHONE HYDROCHLORIDE 4 MILLIGRAM(S): 2 INJECTION INTRAMUSCULAR; INTRAVENOUS; SUBCUTANEOUS at 01:15

## 2018-02-16 RX ADMIN — HYDROMORPHONE HYDROCHLORIDE 203.2 MILLIGRAM(S): 2 INJECTION INTRAMUSCULAR; INTRAVENOUS; SUBCUTANEOUS at 09:47

## 2018-02-16 RX ADMIN — HYDROMORPHONE HYDROCHLORIDE 8 MILLIGRAM(S): 2 INJECTION INTRAMUSCULAR; INTRAVENOUS; SUBCUTANEOUS at 04:29

## 2018-02-16 NOTE — PROGRESS NOTE ADULT - PROBLEM SELECTOR PROBLEM 7
Debility
Severe protein-calorie malnutrition
Severe protein-calorie malnutrition
Debility
Severe protein-calorie malnutrition

## 2018-02-16 NOTE — PROGRESS NOTE ADULT - PROBLEM SELECTOR PROBLEM 1
Pain due to neoplasm
Pathologic fracture
Pain due to neoplasm
Pathologic fracture
Pathologic fracture

## 2018-02-16 NOTE — GOALS OF CARE CONVERSATION - PERSONAL ADVANCE DIRECTIVE - TREATMENT GUIDELINE COMMENT
Transfer to [facility] for continued symptom management. MOLST completed and will accompany patient.

## 2018-02-16 NOTE — PROGRESS NOTE ADULT - SUBJECTIVE AND OBJECTIVE BOX
Pt seen today  Improvement but suboptimal          T(C): 37.2 (02-16-18 @ 06:06), Max: 37.3 (02-15-18 @ 22:06)  HR: 84 (02-16-18 @ 06:06) (84 - 90)  BP: 102/68 (02-16-18 @ 06:06) (102/68 - 116/68)  RR: 18 (02-16-18 @ 06:06) (18 - 18)  SpO2: 100% (02-16-18 @ 06:06) (100% - 100%)  Wt(kg): --        CAPILLARY BLOOD GLUCOSE            calcium carbonate 1250 mG + Vitamin D (OsCal 500 + D) 1 Tablet(s) Oral daily  docusate sodium 100 milliGRAM(s) Oral daily  enoxaparin Injectable 40 milliGRAM(s) SubCutaneous daily  HYDROmorphone  Injectable 3 milliGRAM(s) IV Push once PRN  HYDROmorphone  Injectable 4 milliGRAM(s) IV Push every 3 hours PRN  HYDROmorphone  IVPB 8 milliGRAM(s) IV Intermittent <User Schedule>  melatonin 3 milliGRAM(s) Oral at bedtime  methadone    Tablet 5 milliGRAM(s) Oral <User Schedule>  methadone    Tablet 7.5 milliGRAM(s) Oral <User Schedule>  naloxone Injectable 0.1 milliGRAM(s) IV Push every 3 minutes PRN  polyethylene glycol 3350 17 Gram(s) Oral daily PRN  pregabalin 75 milliGRAM(s) Oral two times a day  propranolol 10 milliGRAM(s) Oral every 12 hours  senna 2 Tablet(s) Oral at bedtime                  Procalc  BNP  ABG              blood and urine cultures                PERTINENT PMH REVIEWED:  [x ] YES [ ] NO           SOCIAL HISTORY:  Significant other/partner:  [x ] YES  [ ] NO            Children:  [x ] YES  [ ] NO                   Zoroastrian/Spirituality: TBD  Substance hx:  [x ] YES   [ ] NO           Tobacco hx:  [ x] YES  [ ] NO             Alcohol hx: [x ] YES  [ ] NO        Home Opioid hx:  [x ] YES  [ ] NO   Living Situation: [ ] Home  [ ] Long term care  [ ] Rehab Shelter    REFERRALS:   [ ] Chaplaincy  [ ] Hospice  [ ] Child Life  [ ] Social Work  [ ] Case management [ ] Holistic Therapy     FAMILY HISTORY:  No pertinent family history in first degree relatives    [ x] Family history non contributory     BASELINE ADLs (prior to admission):  Independent [x ] moderately [ ] fully   Dependent   [ ] moderately [ ] fully    ADVANCE DIRECTIVES:  [ ] YES [x ] NO   DNR [ ] YES [x ] NO                      MOLST  [ ] YES [ x] NO    Living Will  [ ] YES [x ] NO    Health Care Proxy [ ] YES  [x ] NO      [x ] Surrogate  [ ] HCP  [ ] Guardian:     Unbefriended, has adult children but no   idea about their whereabouts                                                        Phone#:    Allergies    No Known Allergies    Intolerances               PRESENT SYMPTOMS:  Source: [x ] Patient   [ ] Family   [ ] Team     Pain: [x ] YES [ ] NO  OLDCARTS:  See above    Dyspnea: [x ] YES [ ] NO On exertion  Anxiety: [x ] YES [ ] NO Minimal  Fatigue: [x ] YES [ ] NO   Nausea: [ ] YES [ x] NO  Loss of appetite: [ ] YES [x ] NO   Constipation: [ ] YES [x ] NO     Other Symptoms:  [ x] All other review of systems negative   [ ] Unable to obtain due to poor mentation     Karnofsky Performance Score/Palliative Performance Status Version 2:     30     % due to Fx  Protein Calorie Malutrition:  [ ] Mild   [ ] Moderate   [ ] Severe       Physical Exam:    General: [ x] Alert,  A&O x     [ ] lethargic   [ ] Agitated   [ ] Cachexia   HEENT: [ ] Normal   [ ] Dry mouth   [ ] ET Tube    [ ] Trach   Lungs: [ x] Clear [ ] Rhonchi  [ ] Crackles [ ] Wheezing [ ] Tachypnea  [ ] Audible excessive secretions   Cardiovascular:  [ ] Regular rate and rhythm  [ ] Irregular [ x] Tachycardia   [ ] Bradycardia   Abdomen: [ x] Soft  [ ] Distended  [ ]  [ ] +BS  [ ] Non tender [ x] Tender  [ ]PEG   [ ] NGT   Last BM:     Genitourinary: [x ] Normal [ ] Incontinent   [ ] Oliguria/Anuria   [ ] [place]  Musculoskeletal:  [ ] Normal   [ ] Generalized weakness  [ x] Bedbound   Neurological: [x ] No focal deficits  [ ] Cognitive impairment     Skin: [x ] Normal   [ ] Pressure ulcers     LABS:                           I&O's Summary      RADIOLOGY & ADDITIONAL STUDIES:

## 2018-02-16 NOTE — PROGRESS NOTE ADULT - ATTENDING COMMENTS
Patient seen and examined, d/w HS, agree with above.     Emotional support provided, appreciate palliative assistance. Patient DNR, c/w pain management, bowel regimen. Plan to transfer to [place] (hospice) for further management.     Discharge time: 45 minutes

## 2018-02-16 NOTE — PROGRESS NOTE ADULT - SUBJECTIVE AND OBJECTIVE BOX
Tacos Garcia  PGY 1  Team 1  Pager [phone]    Patient is a 47y old  Male who presents with a chief complaint of hip pain (03 Feb 2018 12:32)      SUBJECTIVE / OVERNIGHT EVENTS:    MEDICATIONS  (STANDING):  calcium carbonate 1250 mG + Vitamin D (OsCal 500 + D) 1 Tablet(s) Oral daily  docusate sodium 100 milliGRAM(s) Oral daily  enoxaparin Injectable 40 milliGRAM(s) SubCutaneous daily  HYDROmorphone  IVPB 8 milliGRAM(s) IV Intermittent <User Schedule>  melatonin 3 milliGRAM(s) Oral at bedtime  methadone    Tablet 5 milliGRAM(s) Oral <User Schedule>  methadone    Tablet 7.5 milliGRAM(s) Oral <User Schedule>  pregabalin 75 milliGRAM(s) Oral two times a day  propranolol 10 milliGRAM(s) Oral every 12 hours  senna 2 Tablet(s) Oral at bedtime    MEDICATIONS  (PRN):  HYDROmorphone  Injectable 3 milliGRAM(s) IV Push once PRN prior to MRI  HYDROmorphone  Injectable 4 milliGRAM(s) IV Push every 3 hours PRN Severe Pain (7 - 10)  naloxone Injectable 0.1 milliGRAM(s) IV Push every 3 minutes PRN Unarouseable or RR <11  polyethylene glycol 3350 17 Gram(s) Oral daily PRN Constipation      Vital Signs Last 24 Hrs  T(C): 37.2 (16 Feb 2018 06:06), Max: 37.3 (15 Feb 2018 22:06)  T(F): 98.9 (16 Feb 2018 06:06), Max: 99.2 (15 Feb 2018 22:06)  HR: 84 (16 Feb 2018 06:06) (84 - 94)  BP: 102/68 (16 Feb 2018 06:06) (102/68 - 116/68)  BP(mean): --  RR: 18 (16 Feb 2018 06:06) (18 - 18)  SpO2: 100% (16 Feb 2018 06:06) (100% - 100%)  CAPILLARY BLOOD GLUCOSE        I&O's Summary      PHYSICAL EXAM:  GENERAL: NAD, well-developed  HEAD:  Atraumatic, Normocephalic  EYES: EOMI, PERRLA, conjunctiva and sclera clear  NECK: Supple, No JVD  CHEST/LUNG: Clear to auscultation bilaterally; No wheeze  HEART: Regular rate and rhythm; No murmurs, rubs, or gallops  ABDOMEN: Soft, Nontender, Nondistended; Bowel sounds present  EXTREMITIES:  2+ Peripheral Pulses, No clubbing, cyanosis, or edema  PSYCH: AAOx3  NEUROLOGY: non-focal  SKIN: No rashes or lesions    LABS:                    RADIOLOGY & ADDITIONAL TESTS:    Imaging Personally Reviewed:    Consultant(s) Notes Reviewed:      Care Discussed with Consultants/Other Providers: Tacos Garcia  PGY 1  Team 1  Pager [phone]    Patient is a 47y old  Male who presents with a chief complaint of hip pain (03 Feb 2018 12:32)      SUBJECTIVE / OVERNIGHT EVENTS:  Patient states that he had a difficult night after discussion about no being a candidate for Tx and uncurable nature of his disease. He states that he no longer feels suicidal and wants to focus on pain control and the meeting his goals, which he has not yet determined. He had many questions about treatment options, how the cancer spread so quickly and why there are no treatment options, which were explained to him.  Otherwise patient states that he had 1 large BM yesterday. Did not go for MRI due to pain. The pain has been better controlled, however he states he has been preoccupied with the news.  He denies fever, chills, chest pain, abdominal pain, N/V, diarrhea, dysuria.     MEDICATIONS  (STANDING):  calcium carbonate 1250 mG + Vitamin D (OsCal 500 + D) 1 Tablet(s) Oral daily  docusate sodium 100 milliGRAM(s) Oral daily  enoxaparin Injectable 40 milliGRAM(s) SubCutaneous daily  HYDROmorphone  IVPB 8 milliGRAM(s) IV Intermittent <User Schedule>  melatonin 3 milliGRAM(s) Oral at bedtime  methadone    Tablet 5 milliGRAM(s) Oral <User Schedule>  methadone    Tablet 7.5 milliGRAM(s) Oral <User Schedule>  pregabalin 75 milliGRAM(s) Oral two times a day  propranolol 10 milliGRAM(s) Oral every 12 hours  senna 2 Tablet(s) Oral at bedtime    MEDICATIONS  (PRN):  HYDROmorphone  Injectable 3 milliGRAM(s) IV Push once PRN prior to MRI  HYDROmorphone  Injectable 4 milliGRAM(s) IV Push every 3 hours PRN Severe Pain (7 - 10)  naloxone Injectable 0.1 milliGRAM(s) IV Push every 3 minutes PRN Unarouseable or RR <11  polyethylene glycol 3350 17 Gram(s) Oral daily PRN Constipation      Vital Signs Last 24 Hrs  T(C): 37.2 (16 Feb 2018 06:06), Max: 37.3 (15 Feb 2018 22:06)  T(F): 98.9 (16 Feb 2018 06:06), Max: 99.2 (15 Feb 2018 22:06)  HR: 84 (16 Feb 2018 06:06) (84 - 94)  BP: 102/68 (16 Feb 2018 06:06) (102/68 - 116/68)  BP(mean): --  RR: 18 (16 Feb 2018 06:06) (18 - 18)  SpO2: 100% (16 Feb 2018 06:06) (100% - 100%)  CAPILLARY BLOOD GLUCOSE        I&O's Summary      PHYSICAL EXAM:  Gen: NAD this AM, resting comfortably in bed, eating in bed  HEENT: EOMI, mucous membranes moist, no oropharyngeal exudates  Neck:  no cervical lymphadenopathy  Pulmonary: CTAB, no rhonchi, wheezes or rales  Cardiac: RRR, normal S1S2, no r/m/g  GI:  soft, nontender, nondistended  Extremities: cachetic legs, warm, well perfused, no peripheral edema  MSK: LLE ROM limited due to pain decreased more on R than L  Skin: skin intact, no skin tears or rashes or other lesions    LABS:                    RADIOLOGY & ADDITIONAL TESTS:    Imaging Personally Reviewed:    Consultant(s) Notes Reviewed:  Palliative, Ortho, heme/onc    Care Discussed with Consultants/Other Providers: Tacos Garcia  PGY 1  Team 1  Pager [phone]    Patient is a 47y old  Male who presents with a chief complaint of hip pain (03 Feb 2018 12:32)      SUBJECTIVE / OVERNIGHT EVENTS:  Patient states that he had a difficult night after discussion about no being a candidate for Tx and uncurable nature of his disease. He states that he no longer feels suicidal and wants to focus on pain control and the meeting his goals, which he has not yet determined. He had many questions about treatment options, how the cancer spread so quickly and why there are no treatment options, which were explained to him.  Otherwise patient states that he had 1 large BM yesterday. Did not go for MRI due to pain. The pain has been better controlled, however he states he has been preoccupied with the news.  He denies fever, chills, chest pain, abdominal pain, N/V, diarrhea, dysuria.     MEDICATIONS  (STANDING):  calcium carbonate 1250 mG + Vitamin D (OsCal 500 + D) 1 Tablet(s) Oral daily  docusate sodium 100 milliGRAM(s) Oral daily  enoxaparin Injectable 40 milliGRAM(s) SubCutaneous daily  HYDROmorphone  IVPB 8 milliGRAM(s) IV Intermittent <User Schedule>  melatonin 3 milliGRAM(s) Oral at bedtime  methadone    Tablet 5 milliGRAM(s) Oral <User Schedule>  methadone    Tablet 7.5 milliGRAM(s) Oral <User Schedule>  pregabalin 75 milliGRAM(s) Oral two times a day  propranolol 10 milliGRAM(s) Oral every 12 hours  senna 2 Tablet(s) Oral at bedtime    MEDICATIONS  (PRN):  HYDROmorphone  Injectable 3 milliGRAM(s) IV Push once PRN prior to MRI  HYDROmorphone  Injectable 4 milliGRAM(s) IV Push every 3 hours PRN Severe Pain (7 - 10)  naloxone Injectable 0.1 milliGRAM(s) IV Push every 3 minutes PRN Unarouseable or RR <11  polyethylene glycol 3350 17 Gram(s) Oral daily PRN Constipation      Vital Signs Last 24 Hrs  T(C): 37.2 (16 Feb 2018 06:06), Max: 37.3 (15 Feb 2018 22:06)  T(F): 98.9 (16 Feb 2018 06:06), Max: 99.2 (15 Feb 2018 22:06)  HR: 84 (16 Feb 2018 06:06) (84 - 94)  BP: 102/68 (16 Feb 2018 06:06) (102/68 - 116/68)  BP(mean): --  RR: 18 (16 Feb 2018 06:06) (18 - 18)  SpO2: 100% (16 Feb 2018 06:06) (100% - 100%)  CAPILLARY BLOOD GLUCOSE        I&O's Summary      PHYSICAL EXAM:  Gen: NAD this AM, resting comfortably in bed, eating in bed  HEENT: EOMI, mucous membranes moist, no oropharyngeal exudates  Neck:  no cervical lymphadenopathy  Pulmonary: CTAB, no rhonchi, wheezes or rales  Cardiac: RRR, normal S1S2, no r/m/g  GI:  soft, nontender, nondistended  Extremities: cachetic legs, warm, well perfused, no peripheral edema  MSK: LLE ROM limited due to pain decreased more on R than L  Skin: skin intact, no skin tears or rashes or other lesions    LABS:                    RADIOLOGY & ADDITIONAL TESTS:    Imaging Personally Reviewed:    Consultant(s) Notes Reviewed:  Palliative, Ortho, heme/onc    Care Discussed with Consultants/Other Providers: Palliative Dr. Tobin

## 2018-02-16 NOTE — PROGRESS NOTE ADULT - ASSESSMENT
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 Not candidate for chemotherapy... 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 Not candidate for chemotherapy per onc due to lack of support as patient is homeless and is likely candidate for hospice.

## 2018-02-16 NOTE — PROGRESS NOTE ADULT - PROBLEM SELECTOR PLAN 1
left ilium, right acetabulum, and left sacrum in setting of newly diagnosed malignancy. CT findings c/w metastatic dz - primary to be determined  - c/w hydromorphone 8mg qd and will continue methadone 7.5/5mg  w/ hydromorphone 4mg q3h PRN for severe pain; pregabalin also added  -f/u palliative for pain management  - c/w bowel regimen due to opioid induced constipation, last BM 3 days ago.  - f/u MR femur and MRI pelvis as per ortho recs, however patient has been declining past several attempts, has extra pain medication to take w/ states he is ready to go today.  -? about ortho intervention? - likely no surgical intervention  - appreciate ortho/ rad/onc input - potential candidate for radiation therapy however Per Rad Onc patient does not currently want radiation left ilium, right acetabulum, and left sacrum in setting of newly diagnosed malignancy. CT findings c/w metastatic dz - likely adenocarcinoma of unclear primary. Per ortho no surgical intervention as fixation would unlikely benefit patient w/o radiation as patient is currently refusing radiation.  - c/w hydromorphone 8mg qd and will continue methadone 7.5/5mg  w/ hydromorphone 4mg q3h PRN for severe pain; pregabalin also added  -f/u palliative for pain management  - c/w bowel regimen due to opioid induced constipation, last bm 2/15

## 2018-02-16 NOTE — PROGRESS NOTE ADULT - PROBLEM SELECTOR PLAN 2
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  -Await stains for further classification.  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 Per onc not a candidate for systemic chemotherapy due to lack of social support.   -Await stains for further classification.  -Patient potentially candidate for hospice given life limiting disease and lack of treatment options.

## 2018-02-16 NOTE — CHART NOTE - NSCHARTNOTEFT_GEN_A_CORE
Umang has significant metastatic disease, with biopsy consistent with metastatic carcinoma. Goals of surgery would include ambulation and pain control. R acetabulum is candidate for less invasive percutaneous fixation, however without radiation, this would only give pain relief from instability, not tumor. Furthermore, left sacrum / ilium is where the patient is most painful, with compression on sacral nerve roots (helped somewhat with steroids). Both I and the spine surgeon agree that only heroic surgical measures would be able to make this more stable, with poor pain relief. We recommended radiation therapy to help try and alleviate pain, but degree of disease both in and out of the skeleton make a very poor prognosis. If fixing the right side would make him ambulatory, this would be appropriate, however the left side is even more painful and would be unlikely to be ambulatory.  Recommend RT and palliative care - no surgical intervention at this point.   f/u prn     Moises Back MD  Musculoskeletal Oncology  [phone]

## 2018-02-16 NOTE — PROGRESS NOTE ADULT - PROBLEM SELECTOR PROBLEM 8
Palliative care encounter

## 2018-02-16 NOTE — PROGRESS NOTE ADULT - PROBLEM SELECTOR PLAN 7
PPS 30 due to pathologic fracture  Full Code.
- appreciate nutrition recs  - c/w ensure enlive supplementation
- appreciate nutrition recs  - c/w ensure enlive supplementation
PPS 30 due to pathologic fracture  Full Code.
- appreciate nutrition recs  - c/w ensure enlive supplementation
PPS 30 due to pathologic fracture  Full Code.
- appreciate nutrition recs  - c/w ensure enlive supplementation
- appreciate nutrition recs  - c/w ensure enlive supplementation

## 2018-02-16 NOTE — PROGRESS NOTE ADULT - PROVIDER SPECIALTY LIST ADULT
Gastroenterology
Heme/Onc
Internal Medicine
Orthopedics
Orthopedics
Palliative Care
Rad Onc
Palliative Care
Internal Medicine

## 2018-02-16 NOTE — PROGRESS NOTE ADULT - PROBLEM SELECTOR PLAN 1
ATC methadone 7.5/5   On IVPB Dilaudid 8mg q6H  Maintain IV dilaudid 4mg q3H with stipulations relative to the timing of ATC medications

## 2018-02-16 NOTE — PROGRESS NOTE ADULT - PROBLEM SELECTOR PLAN 8
Review pathology with the patient  Discussed the possibility of a plan of care where a focus on comfort is paramount  He revealed great existential distress about being the victim of such a serious illness so young in his life.  We talked for roughly 20 minutes about different aspects of his care.  He was quite somber after our discussion, and he asked for me to return later
"I am scared to death," is what the patient said today.  "I don't know what to do, when I get scared I just pull away from everyone, I have nobody."  Despite the harshness of living on the street, the transformation of this body and uncertainty are terrifying to him.    Chaplaincy/Volunteers/[employer]
Maintain rapport building and emotional support
Maintain rapport building and emotional support    Pal SW follow up    Chaplaincy following
Maintain rapport building and emotional support  Pal SW follow up  Poor social support
Will discuss DMT candidacy on Thursday
Emotional support provided

## 2018-02-16 NOTE — PROGRESS NOTE ADULT - PROBLEM SELECTOR PLAN 3
As per outside records, positive IgG. Complicated by liver cirrhosis, esophageal varices, portal HTN. Outside labs show thrombocytopenia, transaminitis, and elevated alk phos, synthetic function preserved (INR wnl). HCV genotype 1a  - Workup per hepatology pending, defer treatment for HCV at this time given malignancy workup in progress As per outside records, positive IgG. Complicated by liver cirrhosis, esophageal varices, portal HTN. Outside labs show thrombocytopenia, transaminitis, and elevated alk phos, synthetic function preserved (INR wnl). HCV genotype 1a  - Workup per hepatology unlikely to change overall clinical outcome of patient given poorly dif adenocarcinoma w/ metastatis As per outside records, positive IgG. Complicated by liver cirrhosis, esophageal varices, portal HTN. Outside labs show thrombocytopenia, transaminitis, and elevated alk phos, synthetic function preserved (INR wnl). HCV genotype 1a  - Workup per hepatology unlikely to change overall clinical outcome of patient given poorly dif adenocarcinoma w/ metastasis

## 2018-02-16 NOTE — PROGRESS NOTE ADULT - PROBLEM SELECTOR PROBLEM 2
Adenocarcinoma
Substance use disorder
Adenocarcinoma
Hepatitis C
Substance use disorder
Adenocarcinoma
Hepatitis C
Substance use disorder
Hepatitis C

## 2018-02-16 NOTE — GOALS OF CARE CONVERSATION - PERSONAL ADVANCE DIRECTIVE - CONVERSATION DETAILS
Patient being followed by palliative care team for goals of care and symptom management. Pt has met with multi members of the interdisciplinary team. Patient states that he is aware of his current medical condition stating he has terminal cancer. Pt states that he has been processing this information and is trying to digest his diagnosis. Pt states he is emotionally upset and tearful when discussing his current diagnosis and prognosis. Pt has no formal or informal supports. Pt reports being estranged from any family and reports no meaningful friendships. Pt attempting to make sense out of his current medical condition. Pt hopes to contact his adult children whom he is estranged from. Pt has met with unit based , Lyudmila Hicks, regarding transfer to [facility]. Pt is aware that [facility] would be able to provide continued management of his symptoms. He is agreeable to transfer to [facility]. Pt stated he had discussed advanced directives with Dr. Melgar and stated that he would not want CPR or life support at the time of his death. Pt wants comfort and symptom management to be the focus of his care. It was reinforced that [facility] would continue to manage pt's symptoms and provide psychosocial/emotional support regarding pt's new advanced cancer diagnosis and existential distress. Pt has been offered and has been receptive to  support.

## 2018-07-28 NOTE — CONSULT NOTE ADULT - CONSULT REASON
Pain management
Hepatitis C Infection
L hip fx / pain
diffuse mesfin lesions
inability to ambulate
no

## 2022-08-24 NOTE — DISCHARGE NOTE ADULT - VISION (WITH CORRECTIVE LENSES IF THE PATIENT USUALLY WEARS THEM):
Normal vision: sees adequately in most situations; can see medication labels, newsprint (2) well flexed